# Patient Record
Sex: MALE | Race: WHITE | NOT HISPANIC OR LATINO | Employment: FULL TIME | URBAN - METROPOLITAN AREA
[De-identification: names, ages, dates, MRNs, and addresses within clinical notes are randomized per-mention and may not be internally consistent; named-entity substitution may affect disease eponyms.]

---

## 2017-03-28 ENCOUNTER — OFFICE VISIT (OUTPATIENT)
Dept: PEDIATRIC CARDIOLOGY | Facility: CLINIC | Age: 27
End: 2017-03-28
Attending: PEDIATRICS
Payer: COMMERCIAL

## 2017-03-28 ENCOUNTER — HOSPITAL ENCOUNTER (OUTPATIENT)
Dept: CARDIOLOGY | Facility: CLINIC | Age: 27
Discharge: HOME | End: 2017-03-28
Attending: PEDIATRICS | Admitting: PEDIATRICS
Payer: COMMERCIAL

## 2017-03-28 VITALS
DIASTOLIC BLOOD PRESSURE: 82 MMHG | SYSTOLIC BLOOD PRESSURE: 118 MMHG | OXYGEN SATURATION: 100 % | BODY MASS INDEX: 28.93 KG/M2 | HEIGHT: 69 IN | WEIGHT: 195.33 LBS | RESPIRATION RATE: 20 BRPM | HEART RATE: 76 BPM

## 2017-03-28 DIAGNOSIS — Q22.5 EBSTEIN'S ANOMALY: ICD-10-CM

## 2017-03-28 DIAGNOSIS — I47.10 SVT (SUPRAVENTRICULAR TACHYCARDIA) (H): Primary | ICD-10-CM

## 2017-03-28 LAB
EXPTIME-PRE: 6.01 SEC
FEF2575-%PRED-PRE: 100 %
FEF2575-PRE: 4.78 L/SEC
FEF2575-PRED: 4.75 L/SEC
FEFMAX-%PRED-PRE: 119 %
FEFMAX-PRE: 12.21 L/SEC
FEFMAX-PRED: 10.22 L/SEC
FEV1-%PRED-PRE: 98 %
FEV1-PRE: 4.5 L
FEV1FEV6-PRE: 84 %
FEV1FEV6-PRED: 84 %
FEV1FVC-PRE: 84 %
FEV1FVC-PRED: 84 %
FIFMAX-PRE: 6.27 L/SEC
FVC-%PRED-PRE: 96 %
FVC-PRE: 5.33 L
FVC-PRED: 5.51 L

## 2017-03-28 PROCEDURE — 94621 CARDIOPULM EXERCISE TESTING: CPT

## 2017-03-28 PROCEDURE — 99213 OFFICE O/P EST LOW 20 MIN: CPT | Mod: 25,ZF

## 2017-03-28 ASSESSMENT — PAIN SCALES - GENERAL: PAINLEVEL: NO PAIN (0)

## 2017-03-28 NOTE — PATIENT INSTRUCTIONS
PEDS CARDIOLOGY  [facility] [address] [place] [address]  [address] [facility] [id number] [phone]      Cardiology Clinic  [phone]  Cardiology Office  [phone]  RN Care Coordinator, Milvia Barbosa (Bre)  [phone]  Pediatric Call Center/Scheduling  [phone]    After Hours and Emergency Contact Number  [phone]  * Ask for the pediatric cardiologist on call         Prescription Renewals  The pharmacy must fax requests to [phone]  * Please allow 3-4 days for prescriptions to be authorized

## 2017-03-28 NOTE — PROGRESS NOTES
2017             Hamzah Atkins MD    [facility]   [address] [address] [place], [place] [address]      RE:    Xavi Mtz   MRN:  [id number]   :  1990      Dear Dr. Atkins:      I had the pleasure of seeing your 26-year-old patient, Xavi, for cardiac and cardiac rhythm followup on 2017.  I last saw him in 2016.  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  I have known him since he was a baby.  He had a tricuspid valve replacement with a tissue valve in  at the [facility].  He was a 33 mm valve.When I see him next, I will have his [facility] records to be able to specify the type of valve.  Starting in , he had several episodes of supraventricular tachycardia which we eventually managed with diltiazem  mg a day.  He has not had any emergency room visits for the past 2 years.  He occasionally, perhaps twice a month, has 10 minutes to at most and hours worth of tachycardia.  It has not really limited his activities.  He has gotten a job a [employer] company.  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  He has had this job for 6-8 months.  It pays well, but he admits it is very hard work.  He is sometimes lightheaded at work.  He has not fainted.  He takes no chronic medications.      ALLERGIES:  He is allergic to Ceclor.      PHYSICAL EXAMINATION:  Shows an alert, oriented, acyanotic young man accompanied by his brother and mother.  Blood pressure is 118/82 and the heart rate is 76.  Weight is 88.6 kg, and height 175.6 cm.  Weight is stable.  Neck is supple without adenopathy.  Mucous membranes are pink.  Chest is clear.  Cardiac exam shows a quiet precordium with a normal first and physiologically split second heart sound.  There is no murmur.  Liver is not enlarged.  Peripheral pulses are present.  There is no peripheral edema.  Neurologic exam is grossly intact.  There is no  clubbing or cyanosis.      Xavi had an electrocardiogram today, which showed sinus rhythm with first degree AV block and a pseudo right bundle branch block.  He had an exercise stress test using a Lv protocol.  He exercised for 9 minutes 41 seconds.  Blood pressure ish appropriately during exercise, but at peak exercise, he became lightheaded.  Immediately following exercise, the blood pressure had dropped to mid 90s.  Heart rate ish during exercise but did reach a peak of 150 beats per minute.  The data suggested that limits in his ability to increase his stroke volume limited his cardiac output and also limited his ability to diffuse generated CO2.      IMPRESSION:  At this point, it is reasonable to think that Xavi does have limitations in his cardiac output.  I cannot be certain at this point whether they are due to stenosis of his prosthetic valve, deficiencies in performance of his right ventricle secondary to Ebstein, as well as an element of chronotropic incompetence, which I think is mild.  In addition, Xavi is also deconditioned.        At this point, I think we will plan for him to have a followup echo in a couple of months.  If things continue to look the same, I will suggest he undergo cardiac catheterization.  I have spoken to Dr. Kulkarni about that.  We will also think about putting him on a baby aspirin a day.  He will continue his diltiazem.  If he should need to go for cardiac surgery for valve replacement, we will have to address his supraventricular tachycardia.  I have asked Xavi to get in touch with us regarding the size of his prosthetic valve (he did.  It is a 33 mm valve).  He also has a complete copy of his [facility] records which he will bring to his next clinic appointment.      I appreciate the opportunity to participate in Xavi's care.  Please let me know if I can provide any other information for you.      Sincerely,         Breanna Sanchez MD       I spoke with Dr. Kulkarni re:   Xavi's CP treadmill.  He suggested a cardiac MRI to look at the tricuspid valve.  I have sent a note to Dr. Leal about this.  Once I hear back, I will call Xavi.    Dr. Leal thought that cardiac CT would show calcification in the tricuspid valve better than MRI, so I will arrange that.  I have talked to Xavi Moody about this.  I would like to see him in June for echo, and to discuss possible cath.

## 2017-03-28 NOTE — NURSING NOTE
"Chief Complaint   Patient presents with     Heart Problem     SVT.       Initial /82 (BP Location: Right arm, Cuff Size: Adult Large)  Pulse 76  Resp 20  Ht 5' 9.13\" (175.6 cm)  Wt 195 lb 5.2 oz (88.6 kg)  SpO2 100%  BMI 28.73 kg/m2 Estimated body mass index is 28.73 kg/(m^2) as calculated from the following:    Height as of this encounter: 5' 9.13\" (175.6 cm).    Weight as of this encounter: 195 lb 5.2 oz (88.6 kg).  Medication Reconciliation: complete       Smita Bhatt M.A.    "

## 2017-03-28 NOTE — LETTER
3/28/2017      RE: Xavi Mtz  [address] [facility] [address]       2017             Hamzah Atkins MD    [facility]   [address] [address] [place] [address]      RE:    Xavi Mtz   MRN:  [id number]   :  1990      Dear Dr. Atkins:      I had the pleasure of seeing your 26-year-old patient, Xavi, for cardiac and cardiac rhythm followup on 2017.  I last saw him in 2016.  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  I have known him since he was a baby.  He had a tricuspid valve replacement with a tissue valve in  at the [facility].  He was a 33 mm valve.When I see him next, I will have his [facility] records to be able to specify the type of valve.  Starting in , he had several episodes of supraventricular tachycardia which we eventually managed with diltiazem  mg a day.  He has not had any emergency room visits for the past 2 years.  He occasionally, perhaps twice a month, has 10 minutes to at most and hours worth of tachycardia.  It has not really limited his activities.  He has gotten a job a [employer] company.  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  He has had this job for 6-8 months.  It pays well, but he admits it is very hard work.  He is sometimes lightheaded at work.  He has not fainted.  He takes no chronic medications.      ALLERGIES:  He is allergic to Ceclor.      PHYSICAL EXAMINATION:  Shows an alert, oriented, acyanotic young man accompanied by his brother and mother.  Blood pressure is 118/82 and the heart rate is 76.  Weight is 88.6 kg, and height 175.6 cm.  Weight is stable.  Neck is supple without adenopathy.  Mucous membranes are pink.  Chest is clear.  Cardiac exam shows a quiet precordium with a normal first and physiologically split second heart sound.  There is no murmur.  Liver is not enlarged.  Peripheral pulses are  present.  There is no peripheral edema.  Neurologic exam is grossly intact.  There is no clubbing or cyanosis.      Xavi had an electrocardiogram today, which showed sinus rhythm with first degree AV block and a pseudo right bundle branch block.  He had an exercise stress test using a Lv protocol.  He exercised for 9 minutes 41 seconds.  Blood pressure ish appropriately during exercise, but at peak exercise, he became lightheaded.  Immediately following exercise, the blood pressure had dropped to mid 90s.  Heart rate ish during exercise but did reach a peak of 150 beats per minute.  The data suggested that limits in his ability to increase his stroke volume limited his cardiac output and also limited his ability to diffuse generated CO2.      IMPRESSION:  At this point, it is reasonable to think that Xavi does have limitations in his cardiac output.  I cannot be certain at this point whether they are due to stenosis of his prosthetic valve, deficiencies in performance of his right ventricle secondary to Ebstein, as well as an element of chronotropic incompetence, which I think is mild.  In addition, Xavi is also deconditioned.        At this point, I think we will plan for him to have a followup echo in a couple of months.  If things continue to look the same, I will suggest he undergo cardiac catheterization.  I have spoken to Dr. Kulkarni about that.  We will also think about putting him on a baby aspirin a day.  He will continue his diltiazem.  If he should need to go for cardiac surgery for valve replacement, we will have to address his supraventricular tachycardia.  I have asked Xavi to get in touch with us regarding the size of his prosthetic valve (he did.  It is a 33 mm valve).  He also has a complete copy of his [facility] records which he will bring to his next clinic appointment.      I appreciate the opportunity to participate in Xavi's care.  Please let me know if I can provide any other  information for you.      Sincerely,         Breanna Sanchez MD

## 2017-03-28 NOTE — MR AVS SNAPSHOT
After Visit Summary   3/28/2017    Xavi Mtz    MRN: [id number]           Patient Information     Date Of Birth          1990        Visit Information        Provider Department      3/28/2017 1:00 PM Breanna Sanchez MD Peds Cardiology        Today's Diagnoses     SVT (supraventricular tachycardia) (H)    -  1    Ebstein's anomaly          Care Instructions      PEDS CARDIOLOGY  [facility]  [address] [facility] [address] [phone]      Cardiology Clinic  [phone]  Cardiology Office  [phone]  RN Care Coordinator, Milvia Barbosa (Bre)  [phone]  Pediatric Call Center/Scheduling  [phone]    After Hours and Emergency Contact Number  [phone]  * Ask for the pediatric cardiologist on call         Prescription Renewals  The pharmacy must fax requests to [phone]  * Please allow 3-4 days for prescriptions to be authorized               Follow-ups after your visit        Follow-up notes from your care team     Return in about 3 months (around 6/28/2017) for [facility] office.      Who to contact     Please call your clinic at [phone] to:    Ask questions about your health    Make or cancel appointments    Discuss your medicines    Learn about your test results    Speak to your doctor   If you have compliments or concerns about an experience at your clinic, or if you wish to file a complaint, please contact [facility] Patient Relations at [phone] or email us at [email or web address]         Additional Information About Your Visit        ClickFoxhart Information     Showpad is an electronic gateway that provides easy, online access to your medical records. With Showpad, you can request a clinic appointment, read your test results, renew a prescription or communicate with your care team.     To sign up for Showpad visit the website at  "[email or web address]   You will be asked to enter the access code listed below, as well as some personal information. Please follow the directions to create your username and password.     Your access code is: [email or web address]  Expires: 2017  9:17 AM     Your access code will  in 90 days. If you need help or a new code, please contact your [facility] or call [phone] for assistance.        Care EveryWhere ID     This is your Care EveryWhere ID. This could be used by other organizations to access your [place] medical records  [id number]        Your Vitals Were     Pulse Respirations Height Pulse Oximetry BMI (Body Mass Index)       76 20 5' 9.13\" (175.6 cm) 100% 28.73 kg/m2        Blood Pressure from Last 3 Encounters:   17 118/82   16 120/78   06/15/15 120/83    Weight from Last 3 Encounters:   17 195 lb 5.2 oz (88.6 kg)   16 195 lb 12.3 oz (88.8 kg)   06/15/15 202 lb 6.1 oz (91.8 kg)              Today, you had the following     No orders found for display       Primary Care Provider Office Phone # Fax #    Hamzah Atkins [phone]       [facility] [address] [address] [place] [address]        Thank you!     [facility]  [facility] your care. Our goal is always to provide you with excellent care. Hearing back from our patients is one way we can continue to improve our services. Please take a few minutes to complete the written survey that you may receive in the mail after your visit with us. Thank you!             Your Updated Medication List - Protect others around you: Learn how to safely use, store and throw away your medicines at www.disposemymeds.org.          This list is accurate as of: 3/28/17 11:59 PM.  Always use your most recent med list.                   Brand Name Dispense Instructions for use    diltiazem 240 MG 24 hr ER beaded capsule    TIAZAC    30 capsule    Take 1 " capsule (240 mg) by mouth daily

## 2017-04-13 DIAGNOSIS — Q22.5 EBSTEIN'S ANOMALY: Primary | ICD-10-CM

## 2017-04-13 NOTE — PROGRESS NOTES
The CT would not show how the leaflets are working very well. I think echo is the best for that.    CT would be good to tell you about calcification localization. If you werent worried about anything else, Just a plain Chest CT without contrast would be great. I would definitely indicate that you are looking for mitral valve calcifications in the clinical history.    MRI would be able to give you a good idea of how much regurgitation there is, but would be difficult to tell calcification. It would also be good to tell you right ventricular function.    Info from Dr. Xavi Leal

## 2017-05-02 ENCOUNTER — HOSPITAL ENCOUNTER (OUTPATIENT)
Dept: CT IMAGING | Facility: CLINIC | Age: 27
Discharge: HOME | End: 2017-05-02
Attending: PEDIATRICS | Admitting: PEDIATRICS
Payer: COMMERCIAL

## 2017-05-02 DIAGNOSIS — Q22.5 EBSTEIN'S ANOMALY: ICD-10-CM

## 2017-05-02 PROCEDURE — 71250 CT THORAX DX C-: CPT

## 2017-06-27 ENCOUNTER — OFFICE VISIT (OUTPATIENT)
Dept: PEDIATRIC CARDIOLOGY | Facility: CLINIC | Age: 27
End: 2017-06-27
Attending: PEDIATRICS
Payer: MEDICAID

## 2017-06-27 ENCOUNTER — HOSPITAL ENCOUNTER (OUTPATIENT)
Dept: CARDIOLOGY | Facility: CLINIC | Age: 27
Discharge: HOME | End: 2017-06-27
Attending: PEDIATRICS | Admitting: PEDIATRICS
Payer: MEDICAID

## 2017-06-27 VITALS
OXYGEN SATURATION: 99 % | RESPIRATION RATE: 20 BRPM | TEMPERATURE: 97.9 F | DIASTOLIC BLOOD PRESSURE: 84 MMHG | BODY MASS INDEX: 29.22 KG/M2 | HEART RATE: 96 BPM | SYSTOLIC BLOOD PRESSURE: 122 MMHG | WEIGHT: 197.31 LBS | HEIGHT: 69 IN

## 2017-06-27 DIAGNOSIS — I47.10 SVT (SUPRAVENTRICULAR TACHYCARDIA) (H): ICD-10-CM

## 2017-06-27 DIAGNOSIS — Q22.5 EBSTEIN'S ANOMALY: ICD-10-CM

## 2017-06-27 DIAGNOSIS — R00.0 TACHYCARDIA: ICD-10-CM

## 2017-06-27 LAB — INTERPRETATION ECG - MUSE: NORMAL

## 2017-06-27 PROCEDURE — 93325 DOPPLER ECHO COLOR FLOW MAPG: CPT

## 2017-06-27 PROCEDURE — 99213 OFFICE O/P EST LOW 20 MIN: CPT | Mod: 25,ZF

## 2017-06-27 PROCEDURE — 93005 ELECTROCARDIOGRAM TRACING: CPT | Mod: ZF

## 2017-06-27 RX ORDER — DILTIAZEM HYDROCHLORIDE 240 MG/1
240 CAPSULE, EXTENDED RELEASE ORAL DAILY
Qty: 30 CAPSULE | Refills: 11 | Status: SHIPPED | OUTPATIENT
Start: 2017-06-27 | End: 2017-10-02 | Stop reason: DRUGHIGH

## 2017-06-27 NOTE — LETTER
6/27/2017      RE: Xavi Mtz  [address] [facility] [address]         Hamzah Atkins MD    Dear Dr. Atkins,    I had the pleasure of meeting with Xavi, a 26 year old with Ebstein's anomaly on June 27, 2017.  He is here with his mother to review where we stood in terms of his ongoing cardiac evaluation.  In brief, he has a Hoda-Brantley 33 mm prosthetic tricuspid valve which was placed in 1999.  Over the past few years, there has been a gradual increase in his tricuspid valve gradient from 6 mm of Hg to 15 mm of Hg.  He has right ventricular enlargement and abnormal right ventricular systolic function as is common with Ebstein's.  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  This exercise made him lightheaded.  Although he did need the job, he was recently fired and is looking for a job that is not so strenuous.  He did have an exercise study in March 2017 which demonstrated limitation of cardiac output at peak exercise.  He subsequently had a CT scan which showed calcification of his tricuspid valve leaflets.  At this point, it seems that the next step is cardiac catheterization.  He may be a candidate for dilation of his tricuspid valve leaflets.  I did speak with him about tricuspid valve replacement if that seemed appropriate, but we agreed that we would have more information after his cardiac catheterization.  I did speak with Dr. Kulkarni about doing Xavi's catheterization and he was agreeable.  At this point, Xavi is expecting to receive a phone call about the catheterization schedule.      We also discussed Xavi's supraventricualr tachycardia.  It occurs a couple of times a month and lasts a few minutes.  It is much improved on diltiazem 240 mg per day.  If he were to need cardiac surgery for tricupid valve replacement, we would need to consider electrophysiology study and catheter ablation prior to surgery.     I spent more  than 25 minutes all in direct face to face discussion with Xavi and his mother today. EVeryone had a chance to have their questions answered.    I appreciate the opportunity to participate in Xavi's care.  Please let me know if I can provide any other information for you.    Sincerely,    Breanna Sanchez MD

## 2017-06-27 NOTE — PATIENT INSTRUCTIONS
PEDS CARDIOLOGY  [facility] [address] [place] [address]  [address] [facility] [id number] [phone]      Cardiology Clinic  [phone]  Cardiology Office  [phone]  RN Care Coordinator, Milvia Barbosa (Bre)  [phone]  Pediatric Call Center/Scheduling  [phone]    After Hours and Emergency Contact Number  [phone]  * Ask for the pediatric cardiologist on call         Prescription Renewals  The pharmacy must fax requests to [phone]  * Please allow 3-4 days for prescriptions to be authorized

## 2017-06-27 NOTE — MR AVS SNAPSHOT
After Visit Summary   6/27/2017    Xavi Mtz    MRN: [id number]           Patient Information     Date Of Birth          1990        Visit Information        Provider Department      6/27/2017 12:30 PM Breanna Sanchez MD Peds Cardiology        Today's Diagnoses     Ebstein's anomaly        SVT (supraventricular tachycardia) (H)        Tachycardia          Care Instructions      PEDS CARDIOLOGY  [facility]  [address] [facility] [address] [phone]      Cardiology Clinic  [phone]  Cardiology Office  [phone]  RN Care Coordinator, Milvia Barbosa (Bre)  [phone]  Pediatric Call Center/Scheduling  [phone]    After Hours and Emergency Contact Number  [phone]  * Ask for the pediatric cardiologist on call         Prescription Renewals  The pharmacy must fax requests to [phone]  * Please allow 3-4 days for prescriptions to be authorized               Follow-ups after your visit        Follow-up notes from your care team     Return in about 1 year (around 6/27/2018) for echo, ekg.      Who to contact     Please call your clinic at [phone] to:    Ask questions about your health    Make or cancel appointments    Discuss your medicines    Learn about your test results    Speak to your doctor   If you have compliments or concerns about an experience at your clinic, or if you wish to file a complaint, please contact [facility] Physicians Patient Relations at [phone] or email us at [email or web address]         Additional Information About Your Visit        MyChart Information     PayClip is an electronic gateway that provides easy, online access to your medical records. With PayClip, you can request a clinic appointment, read your test results, renew a prescription or communicate with your care team.     To sign up for PayClip visit the website at  "[email or web address]   You will be asked to enter the access code listed below, as well as some personal information. Please follow the directions to create your username and password.     Your access code is: [email or web address]  Expires: 2017  9:17 AM     Your access code will  in 90 days. If you need help or a new code, please contact your [facility] or call [phone] for assistance.        Care EveryWhere ID     This is your Care EveryWhere ID. This could be used by other organizations to access your [place] medical records  [id number]        Your Vitals Were     Pulse Temperature Respirations Height Pulse Oximetry BMI (Body Mass Index)    96 97.9  F (36.6  C) (Oral) 20 5' 9.41\" (176.3 cm) 99% 28.8 kg/m2       Blood Pressure from Last 3 Encounters:   17 122/84   17 118/82   16 120/78    Weight from Last 3 Encounters:   17 197 lb 5 oz (89.5 kg)   17 195 lb 5.2 oz (88.6 kg)   16 195 lb 12.3 oz (88.8 kg)              We Performed the Following     ECG - 12 Lead     EKG 12 lead - pediatric          Where to get your medicines      These medications were sent to [facility] #[id number] [address] [address]    Hours:  Closed 10-16-08 business to [facility] Phone:  [phone]     diltiazem 240 MG 24 hr ER beaded capsule          Primary Care Provider Office Phone # Fax #    Hamzah Atkins [phone]       [facility] [address] [facility] [address]        Equal Access to Services     ILDA HOROWITZ : Maida Hutchinson, wamanuelda lucristino, qaybta kaalmiladis wilhelm, jerrica mei. So [facility] [phone].    ATENCIÓN: Si habla español, tiene a bonilla disposición servicios gratuitos de asistencia lingüística. Llame al [phone].    We comply with applicable federal civil rights laws and [place] laws. We do " not discriminate on the basis of race, color, national origin, age, disability sex, sexual orientation or gender identity.            Thank you!     Thank you for choosing [facility] CARDIOLOGY  for your care. Our goal is always to provide you with excellent care. Hearing back from our patients is one way we can continue to improve our services. Please take a few minutes to complete the written survey that you may receive in the mail after your visit with us. Thank you!             Your Updated Medication List - Protect others around you: Learn how to safely use, store and throw away your medicines at www.disposemymeds.org.          This list is accurate as of: 6/27/17  1:16 PM.  Always use your most recent med list.                   Brand Name Dispense Instructions for use Diagnosis    diltiazem 240 MG 24 hr ER beaded capsule    TIAZAC    30 capsule    Take 1 capsule (240 mg) by mouth daily    Tachycardia

## 2017-06-27 NOTE — NURSING NOTE
"Chief Complaint   Patient presents with     RECHECK     Ebsteins anomaly and SVT       Initial /84 (BP Location: Right arm, Patient Position: Chair, Cuff Size: Adult Large)  Pulse 96  Temp 97.9  F (36.6  C) (Oral)  Resp 20  Ht 5' 9.41\" (176.3 cm)  Wt 197 lb 5 oz (89.5 kg)  SpO2 99%  BMI 28.8 kg/m2 Estimated body mass index is 28.8 kg/(m^2) as calculated from the following:    Height as of this encounter: 5' 9.41\" (176.3 cm).    Weight as of this encounter: 197 lb 5 oz (89.5 kg).  Medication Reconciliation: complete     Ashley Little LPN      "

## 2017-06-27 NOTE — PROGRESS NOTES
Hamzah Atkins MD    Dear Dr. Atkins,    I had the pleasure of meeting with Xavi, a 26 year old with Ebstein's anomaly on June 27, 2017.  He is here with his mother to review where we stood in terms of his ongoing cardiac evaluation.  In brief, he has a Hoda-Brantley 33 mm prosthetic tricuspid valve which was placed in 1999.  Over the past few years, there has been a gradual increase in his tricuspid valve gradient from 6 mm of Hg to 15 mm of Hg.  He has right ventricular enlargement and abnormal right ventricular systolic function as is common with Ebstein's.  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  This exercise made him lightheaded.  Although he did need the job, he was recently fired and is looking for a job that is not so strenuous.  He did have an exercise study in March 2017 which demonstrated limitation of cardiac output at peak exercise.  He subsequently had a CT scan which showed calcification of his tricuspid valve leaflets.  At this point, it seems that the next step is cardiac catheterization.  He may be a candidate for dilation of his tricuspid valve leaflets.  I did speak with him about tricuspid valve replacement if that seemed appropriate, but we agreed that we would have more information after his cardiac catheterization.  I did speak with Dr. Kulkarni about doing Xavi's catheterization and he was agreeable.  At this point, Xavi is expecting to receive a phone call about the catheterization schedule.      We also discussed Xavi's supraventricualr tachycardia.  It occurs a couple of times a month and lasts a few minutes.  It is much improved on diltiazem 240 mg per day.  If he were to need cardiac surgery for tricupid valve replacement, we would need to consider electrophysiology study and catheter ablation prior to surgery.     I spent more than 25 minutes all in direct face to face discussion with Xavi and his mother today.  EVeryone had a chance to have their questions answered.    I appreciate the opportunity to participate in Xavi's care.  Please let me know if I can provide any other information for you.    Sincerely,    Breanna Sanchez MD

## 2017-06-30 ENCOUNTER — TELEPHONE (OUTPATIENT)
Dept: PEDIATRIC CARDIOLOGY | Facility: CLINIC | Age: 27
End: 2017-06-30

## 2017-07-01 NOTE — TELEPHONE ENCOUNTER
Xavi called around 8 PM on 6/30/17 due to concern of left shoulder and elbow tingling.  He states that approx. 20-30 min prior he had episode of SVT with -215 bpm while sitting at the dinner table which self-resolved in about 5 min.  After this episode he noticed the left elbow and shoulder tingling.  He denies numbness or pain in the LUE.  Denies chest pain or pressure.  He has not experienced any difficulty using his extremities, weakness, walking and denies vision changes, shortness of breath.  He did say that when he reached over to his right shoulder this improved the pain.  Discussed with Dr. Holland and we both agree this is likely benign, but if he was to experience any of the above symptoms he is contact us so we can direct him for evaluation.      Diego Porter DO  Pediatric Cardiology Fellow  6/30/2017 11:30 PM

## 2017-07-10 ENCOUNTER — APPOINTMENT (OUTPATIENT)
Dept: OCCUPATIONAL MEDICINE | Facility: CLINIC | Age: 27
End: 2017-07-10

## 2017-07-10 PROCEDURE — 99499 UNLISTED E&M SERVICE: CPT | Performed by: PHYSICIAN ASSISTANT

## 2017-07-10 PROCEDURE — 82075 ASSAY OF BREATH ETHANOL: CPT | Performed by: PHYSICIAN ASSISTANT

## 2017-07-10 PROCEDURE — 99000 SPECIMEN HANDLING OFFICE-LAB: CPT | Performed by: PHYSICIAN ASSISTANT

## 2017-07-10 PROCEDURE — 97799 UNLISTED PHYSCL MED/REHAB PX: CPT | Performed by: PHYSICIAN ASSISTANT

## 2017-07-26 ENCOUNTER — ANESTHESIA EVENT (OUTPATIENT)
Dept: SURGERY | Facility: CLINIC | Age: 27
End: 2017-07-26
Payer: MEDICAID

## 2017-07-26 NOTE — OR NURSING
Yesterday during pre-op call with a different RN, pt had a question regarding whether to take or hold his Diltiazem. Cath lab called and spoke to fellow on call, Dr. Lawler, who stated she would speak to Dr. Kulkarni and call Xavi back and inform him whether he should hold the medication or not. She stated he should be fine to take it but was going to double check. Pt's phone # given to Dr. Lawler.

## 2017-07-27 ENCOUNTER — SURGERY (OUTPATIENT)
Age: 27
End: 2017-07-27

## 2017-07-27 ENCOUNTER — APPOINTMENT (OUTPATIENT)
Dept: CARDIOLOGY | Facility: CLINIC | Age: 27
End: 2017-07-27
Attending: PHYSICIAN ASSISTANT
Payer: MEDICAID

## 2017-07-27 ENCOUNTER — APPOINTMENT (OUTPATIENT)
Dept: CARDIOLOGY | Facility: CLINIC | Age: 27
End: 2017-07-27
Attending: PEDIATRICS
Payer: MEDICAID

## 2017-07-27 ENCOUNTER — HOSPITAL ENCOUNTER (OUTPATIENT)
Facility: CLINIC | Age: 27
Setting detail: OBSERVATION
Discharge: HOME | End: 2017-07-28
Attending: PEDIATRICS | Admitting: PEDIATRICS
Payer: MEDICAID

## 2017-07-27 ENCOUNTER — ANESTHESIA (OUTPATIENT)
Dept: SURGERY | Facility: CLINIC | Age: 27
End: 2017-07-27
Payer: MEDICAID

## 2017-07-27 DIAGNOSIS — Q22.5 EBSTEIN'S ANOMALY: Primary | ICD-10-CM

## 2017-07-27 DIAGNOSIS — M54.2 NECK PAIN: ICD-10-CM

## 2017-07-27 LAB
ABO + RH BLD: NORMAL
ABO + RH BLD: NORMAL
BLD GP AB SCN SERPL QL: NORMAL
BLD PROD TYP BPU: NORMAL
BLD UNIT ID BPU: 0
BLD UNIT ID BPU: 0
BLOOD BANK CMNT PATIENT-IMP: NORMAL
BLOOD PRODUCT CODE: NORMAL
BLOOD PRODUCT CODE: NORMAL
BPU ID: NORMAL
BPU ID: NORMAL
HCT VFR BLD AUTO: 37.1 % (ref 40–53)
HGB BLD-MCNC: 13.3 G/DL (ref 13.3–17.7)
KCT BLD-ACNC: 192 SEC (ref 105–167)
KCT BLD-ACNC: 200 SEC (ref 105–167)
KCT BLD-ACNC: 213 SEC (ref 105–167)
KCT BLD-ACNC: 217 SEC (ref 105–167)
NUM BPU REQUESTED: 2
SPECIMEN EXP DATE BLD: NORMAL
TRANSFUSION STATUS PATIENT QL: NORMAL

## 2017-07-27 PROCEDURE — 27210956 ZZH BALLOON TIP PRESSURE CR7

## 2017-07-27 PROCEDURE — 27210946 ZZH KIT HC TOTES DISP CR8

## 2017-07-27 PROCEDURE — 25000128 H RX IP 250 OP 636: Performed by: ANESTHESIOLOGY

## 2017-07-27 PROCEDURE — P9041 ALBUMIN (HUMAN),5%, 50ML: HCPCS | Performed by: NURSE ANESTHETIST, CERTIFIED REGISTERED

## 2017-07-27 PROCEDURE — C1725 CATH, TRANSLUMIN NON-LASER: HCPCS

## 2017-07-27 PROCEDURE — 25000132 ZZH RX MED GY IP 250 OP 250 PS 637: Performed by: STUDENT IN AN ORGANIZED HEALTH CARE EDUCATION/TRAINING PROGRAM

## 2017-07-27 PROCEDURE — 27210769 ZZH KIT ACIST INJECTOR CR4

## 2017-07-27 PROCEDURE — 40000170 ZZH STATISTIC PRE-PROCEDURE ASSESSMENT II: Performed by: PEDIATRICS

## 2017-07-27 PROCEDURE — 85014 HEMATOCRIT: CPT | Performed by: STUDENT IN AN ORGANIZED HEALTH CARE EDUCATION/TRAINING PROGRAM

## 2017-07-27 PROCEDURE — 92990 REVISION OF PULMONARY VALVE: CPT

## 2017-07-27 PROCEDURE — 25000128 H RX IP 250 OP 636: Performed by: STUDENT IN AN ORGANIZED HEALTH CARE EDUCATION/TRAINING PROGRAM

## 2017-07-27 PROCEDURE — 83605 ASSAY OF LACTIC ACID: CPT | Performed by: PEDIATRICS

## 2017-07-27 PROCEDURE — 25000128 H RX IP 250 OP 636: Performed by: NURSE ANESTHETIST, CERTIFIED REGISTERED

## 2017-07-27 PROCEDURE — C1887 CATHETER, GUIDING: HCPCS

## 2017-07-27 PROCEDURE — C1769 GUIDE WIRE: HCPCS

## 2017-07-27 PROCEDURE — 86901 BLOOD TYPING SEROLOGIC RH(D): CPT | Performed by: PEDIATRICS

## 2017-07-27 PROCEDURE — 86850 RBC ANTIBODY SCREEN: CPT | Performed by: PEDIATRICS

## 2017-07-27 PROCEDURE — 27210841 ZZH MANIFOLD CR5

## 2017-07-27 PROCEDURE — 27211192 ZZ H SHEATH CR14

## 2017-07-27 PROCEDURE — 86923 COMPATIBILITY TEST ELECTRIC: CPT | Performed by: PEDIATRICS

## 2017-07-27 PROCEDURE — 93306 TTE W/DOPPLER COMPLETE: CPT

## 2017-07-27 PROCEDURE — 83605 ASSAY OF LACTIC ACID: CPT | Performed by: ANESTHESIOLOGY

## 2017-07-27 PROCEDURE — 93799 UNLISTED CV SVC/PROCEDURE: CPT

## 2017-07-27 PROCEDURE — G0378 HOSPITAL OBSERVATION PER HR: HCPCS

## 2017-07-27 PROCEDURE — 36010 PLACE CATHETER IN VEIN: CPT

## 2017-07-27 PROCEDURE — 85347 COAGULATION TIME ACTIVATED: CPT | Mod: 91

## 2017-07-27 PROCEDURE — 75827 VEIN X-RAY CHEST: CPT

## 2017-07-27 PROCEDURE — 82803 BLOOD GASES ANY COMBINATION: CPT | Performed by: ANESTHESIOLOGY

## 2017-07-27 PROCEDURE — 93566 NJX CAR CTH SLCTV RV/RA ANG: CPT

## 2017-07-27 PROCEDURE — 27210803 ZZH SHEATH CR11

## 2017-07-27 PROCEDURE — 93531 ZZHC COMB RT & LT HEART CATH FOR CONGENITAL ANOMALIES: CPT

## 2017-07-27 PROCEDURE — 36415 COLL VENOUS BLD VENIPUNCTURE: CPT | Performed by: PEDIATRICS

## 2017-07-27 PROCEDURE — P9016 RBC LEUKOCYTES REDUCED: HCPCS | Performed by: PEDIATRICS

## 2017-07-27 PROCEDURE — 25000128 H RX IP 250 OP 636: Performed by: PEDIATRICS

## 2017-07-27 PROCEDURE — 86900 BLOOD TYPING SEROLOGIC ABO: CPT | Performed by: PEDIATRICS

## 2017-07-27 PROCEDURE — 85018 HEMOGLOBIN: CPT | Performed by: STUDENT IN AN ORGANIZED HEALTH CARE EDUCATION/TRAINING PROGRAM

## 2017-07-27 PROCEDURE — 27210741 ZZH BALLOON TIP PRESSURE CR6

## 2017-07-27 PROCEDURE — 37000009 ZZH ANESTHESIA TECHNICAL FEE, EACH ADDTL 15 MIN: Performed by: PEDIATRICS

## 2017-07-27 PROCEDURE — 25000125 ZZHC RX 250: Performed by: NURSE ANESTHETIST, CERTIFIED REGISTERED

## 2017-07-27 PROCEDURE — 37000008 ZZH ANESTHESIA TECHNICAL FEE, 1ST 30 MIN: Performed by: PEDIATRICS

## 2017-07-27 RX ORDER — KETOROLAC TROMETHAMINE 30 MG/ML
30 INJECTION, SOLUTION INTRAMUSCULAR; INTRAVENOUS ONCE
Status: COMPLETED | OUTPATIENT
Start: 2017-07-27 | End: 2017-07-27

## 2017-07-27 RX ORDER — DILTIAZEM HYDROCHLORIDE 240 MG/1
240 CAPSULE, EXTENDED RELEASE ORAL DAILY
Status: DISCONTINUED | OUTPATIENT
Start: 2017-07-27 | End: 2017-07-27

## 2017-07-27 RX ORDER — ACETAMINOPHEN 325 MG/1
650 TABLET ORAL EVERY 4 HOURS PRN
Status: DISCONTINUED | OUTPATIENT
Start: 2017-07-27 | End: 2017-07-28

## 2017-07-27 RX ORDER — HEPARIN SODIUM 1000 [USP'U]/ML
INJECTION, SOLUTION INTRAVENOUS; SUBCUTANEOUS PRN
Status: DISCONTINUED | OUTPATIENT
Start: 2017-07-27 | End: 2017-07-27

## 2017-07-27 RX ORDER — PROPOFOL 10 MG/ML
INJECTION, EMULSION INTRAVENOUS CONTINUOUS PRN
Status: DISCONTINUED | OUTPATIENT
Start: 2017-07-27 | End: 2017-07-27

## 2017-07-27 RX ORDER — SODIUM CHLORIDE, SODIUM LACTATE, POTASSIUM CHLORIDE, CALCIUM CHLORIDE 600; 310; 30; 20 MG/100ML; MG/100ML; MG/100ML; MG/100ML
INJECTION, SOLUTION INTRAVENOUS CONTINUOUS PRN
Status: DISCONTINUED | OUTPATIENT
Start: 2017-07-27 | End: 2017-07-27

## 2017-07-27 RX ORDER — DILTIAZEM HYDROCHLORIDE 240 MG/1
240 CAPSULE, COATED, EXTENDED RELEASE ORAL DAILY
Status: DISCONTINUED | OUTPATIENT
Start: 2017-07-28 | End: 2017-07-27

## 2017-07-27 RX ORDER — ALBUMIN, HUMAN INJ 5% 5 %
SOLUTION INTRAVENOUS CONTINUOUS PRN
Status: DISCONTINUED | OUTPATIENT
Start: 2017-07-27 | End: 2017-07-27

## 2017-07-27 RX ORDER — DILTIAZEM HYDROCHLORIDE 120 MG/1
240 CAPSULE, COATED, EXTENDED RELEASE ORAL DAILY
Status: DISCONTINUED | OUTPATIENT
Start: 2017-07-28 | End: 2017-07-27

## 2017-07-27 RX ORDER — FENTANYL CITRATE 50 UG/ML
25-50 INJECTION, SOLUTION INTRAMUSCULAR; INTRAVENOUS
Status: DISCONTINUED | OUTPATIENT
Start: 2017-07-27 | End: 2017-07-27

## 2017-07-27 RX ORDER — ONDANSETRON 2 MG/ML
4 INJECTION INTRAMUSCULAR; INTRAVENOUS EVERY 30 MIN PRN
Status: DISCONTINUED | OUTPATIENT
Start: 2017-07-27 | End: 2017-07-27

## 2017-07-27 RX ORDER — PROPOFOL 10 MG/ML
INJECTION, EMULSION INTRAVENOUS PRN
Status: DISCONTINUED | OUTPATIENT
Start: 2017-07-27 | End: 2017-07-27

## 2017-07-27 RX ORDER — ONDANSETRON 4 MG/1
4 TABLET, ORALLY DISINTEGRATING ORAL EVERY 30 MIN PRN
Status: DISCONTINUED | OUTPATIENT
Start: 2017-07-27 | End: 2017-07-27

## 2017-07-27 RX ORDER — IODIXANOL 320 MG/ML
INJECTION, SOLUTION INTRAVASCULAR PRN
Status: DISCONTINUED | OUTPATIENT
Start: 2017-07-27 | End: 2017-07-27 | Stop reason: HOSPADM

## 2017-07-27 RX ORDER — SODIUM CHLORIDE, SODIUM LACTATE, POTASSIUM CHLORIDE, CALCIUM CHLORIDE 600; 310; 30; 20 MG/100ML; MG/100ML; MG/100ML; MG/100ML
INJECTION, SOLUTION INTRAVENOUS CONTINUOUS
Status: DISCONTINUED | OUTPATIENT
Start: 2017-07-27 | End: 2017-07-27

## 2017-07-27 RX ORDER — DOPAMINE HYDROCHLORIDE 160 MG/100ML
INJECTION, SOLUTION INTRAVENOUS CONTINUOUS PRN
Status: DISCONTINUED | OUTPATIENT
Start: 2017-07-27 | End: 2017-07-27

## 2017-07-27 RX ORDER — CALCIUM CHLORIDE 100 MG/ML
INJECTION INTRAVENOUS; INTRAVENTRICULAR PRN
Status: DISCONTINUED | OUTPATIENT
Start: 2017-07-27 | End: 2017-07-27

## 2017-07-27 RX ORDER — ONDANSETRON 2 MG/ML
INJECTION INTRAMUSCULAR; INTRAVENOUS PRN
Status: DISCONTINUED | OUTPATIENT
Start: 2017-07-27 | End: 2017-07-27

## 2017-07-27 RX ORDER — FENTANYL CITRATE 50 UG/ML
INJECTION, SOLUTION INTRAMUSCULAR; INTRAVENOUS PRN
Status: DISCONTINUED | OUTPATIENT
Start: 2017-07-27 | End: 2017-07-27

## 2017-07-27 RX ORDER — CEFAZOLIN SODIUM 1 G/3ML
INJECTION, POWDER, FOR SOLUTION INTRAMUSCULAR; INTRAVENOUS PRN
Status: DISCONTINUED | OUTPATIENT
Start: 2017-07-27 | End: 2017-07-27

## 2017-07-27 RX ORDER — LIDOCAINE HYDROCHLORIDE 20 MG/ML
INJECTION, SOLUTION INFILTRATION; PERINEURAL PRN
Status: DISCONTINUED | OUTPATIENT
Start: 2017-07-27 | End: 2017-07-27

## 2017-07-27 RX ADMIN — HEPARIN SODIUM 1500 UNITS: 1000 INJECTION, SOLUTION INTRAVENOUS; SUBCUTANEOUS at 13:42

## 2017-07-27 RX ADMIN — DOPAMINE HYDROCHLORIDE 5 MCG/KG/MIN: 160 INJECTION, SOLUTION INTRAVENOUS at 13:31

## 2017-07-27 RX ADMIN — SODIUM CHLORIDE, POTASSIUM CHLORIDE, SODIUM LACTATE AND CALCIUM CHLORIDE: 600; 310; 30; 20 INJECTION, SOLUTION INTRAVENOUS at 14:21

## 2017-07-27 RX ADMIN — PROPOFOL 30 MG: 10 INJECTION, EMULSION INTRAVENOUS at 14:10

## 2017-07-27 RX ADMIN — ALBUMIN (HUMAN): 12.5 SOLUTION INTRAVENOUS at 12:55

## 2017-07-27 RX ADMIN — ACETAMINOPHEN 650 MG: 325 TABLET, FILM COATED ORAL at 17:07

## 2017-07-27 RX ADMIN — FENTANYL CITRATE 50 MCG: 50 INJECTION, SOLUTION INTRAMUSCULAR; INTRAVENOUS at 11:31

## 2017-07-27 RX ADMIN — HEPARIN SODIUM 6000 UNITS: 1000 INJECTION, SOLUTION INTRAVENOUS; SUBCUTANEOUS at 12:48

## 2017-07-27 RX ADMIN — CEFAZOLIN 2 G: 1 INJECTION, POWDER, FOR SOLUTION INTRAMUSCULAR; INTRAVENOUS at 11:55

## 2017-07-27 RX ADMIN — KETOROLAC TROMETHAMINE 30 MG: 30 INJECTION, SOLUTION INTRAMUSCULAR; INTRAVENOUS at 18:56

## 2017-07-27 RX ADMIN — SODIUM CHLORIDE, POTASSIUM CHLORIDE, SODIUM LACTATE AND CALCIUM CHLORIDE: 600; 310; 30; 20 INJECTION, SOLUTION INTRAVENOUS at 11:42

## 2017-07-27 RX ADMIN — CALCIUM CHLORIDE 500 MG: 100 INJECTION, SOLUTION INTRAVENOUS at 13:00

## 2017-07-27 RX ADMIN — PROPOFOL 20 MG: 10 INJECTION, EMULSION INTRAVENOUS at 14:16

## 2017-07-27 RX ADMIN — PROPOFOL 30 MG: 10 INJECTION, EMULSION INTRAVENOUS at 14:02

## 2017-07-27 RX ADMIN — SODIUM CHLORIDE, POTASSIUM CHLORIDE, SODIUM LACTATE AND CALCIUM CHLORIDE: 600; 310; 30; 20 INJECTION, SOLUTION INTRAVENOUS at 13:31

## 2017-07-27 RX ADMIN — ACETAMINOPHEN 650 MG: 325 TABLET, FILM COATED ORAL at 22:53

## 2017-07-27 RX ADMIN — MIDAZOLAM HYDROCHLORIDE 2 MG: 1 INJECTION, SOLUTION INTRAMUSCULAR; INTRAVENOUS at 11:31

## 2017-07-27 RX ADMIN — LIDOCAINE HYDROCHLORIDE 60 MG: 20 INJECTION, SOLUTION INFILTRATION; PERINEURAL at 11:42

## 2017-07-27 RX ADMIN — CALCIUM CHLORIDE 500 MG: 100 INJECTION, SOLUTION INTRAVENOUS at 13:05

## 2017-07-27 RX ADMIN — PROPOFOL 30 MG: 10 INJECTION, EMULSION INTRAVENOUS at 11:42

## 2017-07-27 RX ADMIN — PROPOFOL 100 MCG/KG/MIN: 10 INJECTION, EMULSION INTRAVENOUS at 11:42

## 2017-07-27 RX ADMIN — ONDANSETRON 4 MG: 2 INJECTION INTRAMUSCULAR; INTRAVENOUS at 15:10

## 2017-07-27 RX ADMIN — IODIXANOL 129 ML: 320 INJECTION, SOLUTION INTRAVASCULAR at 15:33

## 2017-07-27 RX ADMIN — CEFAZOLIN 2 G: 1 INJECTION, POWDER, FOR SOLUTION INTRAMUSCULAR; INTRAVENOUS at 15:07

## 2017-07-27 RX ADMIN — PROPOFOL 30 MG: 10 INJECTION, EMULSION INTRAVENOUS at 11:45

## 2017-07-27 NOTE — H&P
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  Presenting for cardiac catheterization and balloon dilation after findings of increasing pressure gradients across valve and low CO.    During catheterization had low systolics to 60s requiring brief dopamine administration.  Patient arrived to CVICU on no cardiovascular medications, awake, and spontaneously breathing.    Problem List:  Patient Active Problem List    Diagnosis Date Noted     Ebstein's anomaly 03/24/2015     Priority: Medium     S/p tricuspid valve replacement 1999       SVT (supraventricular tachycardia) (H) 03/24/2015     Priority: Medium         Past Procedural History:  Past Surgical History:   Procedure Laterality Date     CARDIAC SURGERY           Cardiac Catheterization Results:  Baseline  RA filling pressures high mean 15/17/14  RV 23/3/7  CI 3.97 L/min  Tricuspid valve area 0.64 cm2  Calculated mean mitral valve gradient 11 mmHg     After balloon dilation and on inotrope Dopamine  CI 7.25 L/min  Valve area 1.81 cm2  Calculated mean mitral valve gradient 5.3 mmHg      Interval History:  Patient arrived extubated on a backboard, with no s/sx of bleeding at cath site and stable hemodynamics.      Plan by Systems:    CV:  - Echo in AM  - monitor cath sites in groin b/l and right neck  - flat until 2000  - on home diltiazem    RESP:   - On facemask, wean O2 as able    FEN/GI:   - ice chips and sips allowed while supine  - allowed to PO full diet once upright    HEME:   -Monitor cath site for any s/sx of bleeding, bruising or hematoma  - CBC at 1800    ID:   -No active issues    ENDO:   -No active issues    CNS:   -Tylenol PRN for comfort   - Toradol x1 if needed       Vitals:  All vital signs  reviewed    Temp:  [97.9  F (36.6  C)] 97.9  F (36.6  C)  Heart Rate:  [76] 76  Resp:  [18] 18  BP: (119)/(87) 119/87  SpO2:  [98 %] 98 %  SpO2 Readings from Last 2 Encounters:   07/27/17 98%   06/27/17 99%           I/O last 3 completed shifts:  In: 1500 [I.V.:1000]  Out: -   I/O this shift:  In: -   Out: 1300 [Urine:1300]    Medications:  All medications reviewed      Physical Exam  Awake, alert, NAD, interacting appropriately  CTAB, no increased WOB  HR in 60s, no murmur auscultated  Ab soft, NTND, no masses or HSM  Mild tenderness over right neck and right groin  Bandages over right neck and left and right groin with no bleeding or hematomas  PT and DP palpated easily on both feet    Radiology:  All radiological studies reviewed    Laboratory:  All laboratory values reviewed    Pediatric Critical Care Attestation:     Patient is admitted to the ICU and is receiving intermediate level cares after cath procedure to balloon dilate tricuspid valve. Monitor cath sites for bleeding, check H and H tonight, toradol and tylenol for pain.  I personally examined and evaluated the patient today, and have discussed plans with the resident/NP/PA/fellow and nurse. All physician orders and treatments were placed at my direction.   Patient's weight today: 198 lbs 13.68 oz    I spent a total of  35  minutes providing hospital care at the bedside and on the unit, evaluating the patient, directing care and reviewing laboratory values and radiologic reports for this patient.    Alea Velasquez MD

## 2017-07-27 NOTE — ANESTHESIA POSTPROCEDURE EVALUATION
Patient: Xavi Mtz    Procedure(s):  Right and Left Heart Cath    Diagnosis:Ebstein's Anomaly  Diagnosis Additional Information: No value filed.    Anesthesia Type:  General, Other    Note:  Anesthesia Post Evaluation    Patient location during evaluation: ICU  Patient participation: Able to fully participate in evaluation  Level of consciousness: awake and alert  Pain management: adequate  Airway patency: patent  Cardiovascular status: hemodynamically stable  Respiratory status: spontaneous ventilation  Hydration status: euvolemic  PONV: none     Anesthetic complications: None          Last vitals:  Vitals:    07/27/17 0916   BP: 119/87   Resp: 18   Temp: 36.6  C (97.9  F)   SpO2: 98%         Electronically Signed By: Althea Dejesus MD  July 27, 2017  4:08 PM

## 2017-07-27 NOTE — ANESTHESIA PREPROCEDURE EVALUATION
Anesthesia Plan      History & Physical Review  History and physical reviewed and following examination; no interval change.    ASA Status:  3 .    NPO Status:  > 6 hours    Plan for General and Other with Intravenous induction. Maintenance will be TIVA.    PONV prophylaxis:  Ondansetron (or other 5HT-3)       Postoperative Care  Postoperative pain management:  IV analgesics.      Consents  Anesthetic plan, risks, benefits and alternatives discussed with:  Patient..        ANESTHESIA PREOP EVALUATION    Procedure: right and left heart cathetrization    HPI: Ebstein's anomaly, s/p TV replacement    PMHx/PSHx/ROS:  Past Medical History:   Diagnosis Date     Congenital heart disease     Ebstein's Anomaly     Past Surgical History:   Procedure Laterality Date     CARDIAC SURGERY       CV: as above  Pulm: neg  GI: neg  : neg  Endo: neg  CNS/Psych: neg  MSK: neg  Heme: neg  HEENT: neg    Past Anes Hx: No personal or family h/o anesthesia problems    Soc Hx:   Tobacco: neg  EtOH: neg    Allergies:   Allergies   Allergen Reactions     Ceclor [Cefaclor]      Mouth ulcers     Meds:   Prescriptions Prior to Admission   Medication Sig Dispense Refill Last Dose     diltiazem (TIAZAC) 240 MG 24 hr ER beaded capsule Take 1 capsule (240 mg) by mouth daily 30 capsule 11      No current outpatient prescriptions on file.     Physical Exam:  VS: T 97.9, P Data Unavailable, /87, R 18, SpO2 98%.    Weight 90 kg.  Airway: feasible  Dentition: noted  Heart: rrr+m  Lungs: ctab    NPO Status: OK    TTE: Patient with history of Ebstein's Anamoly, s/p 33 mm tricuspid valve  replacement. Tricuspid valve mean gradient 15 mmHg. Trivial tricuspid valve  insufficiency. 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 The right ventricular function is qualitatively mild  to moderately depressed. Normal  contractility of the left ventricular free  wall and apex.  No significant change from last echocardiogram.    Assessment/Plan:  - ASA 3  - GA with native airway and with standard ASA monitors, IV induction, balanced anesthetic  - PIVx1  - Antibiotics per surgery  - PONV prophylaxis  - Relevant risks, benefits, alternatives and the anesthetic plan were discussed with patient/family or family representative.  All questions were answered and there was agreement to proceed.      Althea Dejesus MD  Staff Anesthesiologist  [phone]    7/27/2017  9:37 AM

## 2017-07-27 NOTE — ANESTHESIA CARE TRANSFER NOTE
Patient: Xavi Mtz    Procedure(s):  Right and Left Heart Cath    Diagnosis: Ebstein's Anomaly  Diagnosis Additional Information: No value filed.    Anesthesia Type:   General, Other     Note:  Airway :Face Mask  Patient transferred to:ICU  Comments: Transported to ICU with FM O2.  Monitors on. VSS.  Report given.  Patient comfortable.      Vitals: (Last set prior to Anesthesia Care Transfer)    CRNA VITALS  7/27/2017 1519 - 7/27/2017 1553      7/27/2017             Resp Rate (set): 10                Electronically Signed By: WENDY Abarca CRNA  July 27, 2017  3:53 PM

## 2017-07-27 NOTE — PROGRESS NOTES
Admitted from cath lab for observation.  Bilateral groins and right neck entry sites.  Accompanied by OR staff and handoff to CVICU staff at bedside.  On admission no active bleeding noted minimal swelling of the right neck, with 2/2 pulses.  Vital signs and assessment per flow sheet.

## 2017-07-27 NOTE — OR NURSING
Stress test per patient shows, Xavi taking in enough oxygen and not blowing off enough bi products

## 2017-07-27 NOTE — IP AVS SNAPSHOT
MRN:[id number]                      After Visit Summary   7/27/2017    Xavi Mtz    MRN: [id number]           Thank you!     Thank you for choosing [facility] for your care. Our goal is always to provide you with excellent care. Hearing back from our patients is one way we can continue to improve our services. Please take a few minutes to complete the written survey that you may receive in the mail after you visit with us. Thank you!        Patient Information     Date Of Birth          1990        About your hospital stay     You were admitted on:  July 27, 2017 You last received care in the:  [facility] Pediatric Cardiovascular ICU    You were discharged on:  July 28, 2017        Reason for your hospital stay       Post catheterization                  Who to Call     For medical emergencies, please call 911.  For non-urgent questions about your medical care, please call your primary care provider or clinic, [phone]  For questions related to your surgery, please call your surgery clinic        Attending Provider     Provider Specialty    Marisa Jessica MD Pediatric Cardiology       Primary Care Provider Office Phone # Fax #    Hamzah Atkins [phone]      After Care Instructions     Activity       Resume regular activity            Diet       Resume regular home diet                  Follow-up Appointments     Follow Up and recommended labs and tests       Please follow up with PCP within a week.    Please follow up with primary cardiologist Dr. Breanna Sanchez in one month.                  Pending Results     Date and Time Order Name Status Description    7/26/2017 0000 EKG CARDIAC - HIM SCAN Preliminary             Statement of Approval     Ordered          07/28/17 1105  I have reviewed and agree with all the recommendations and orders detailed in this document.  EFFECTIVE NOW     Approved and electronically  "signed by:  Chan Ziegler MD             Admission Information     Date & Time Provider Department Dept. Phone    2017 Marisa Jessica MD [facility] Pediatric Cardiovascular [phone]      Your Vitals Were     Blood Pressure Temperature Respirations Height Weight Pulse Oximetry    125/80 98.4  F (36.9  C) (Oral) 23 1.759 m (5' 9.25\") 90.2 kg (198 lb 13.7 oz) 96%    BMI (Body Mass Index)                   29.15 kg/m2           MyChart Information     [employer] lets you send messages to your doctor, view your test results, renew your prescriptions, schedule appointments and more. To sign up, go to [email or web address] . Click on \"Log in\" on the left side of the screen, which will take you to the Welcome page. Then click on \"Sign up Now\" on the right side of the page.     You will be asked to enter the access code listed below, as well as some personal information. Please follow the directions to create your username and password.     Your access code is: [email or web address]  Expires: 10/26/2017 11:09 AM     Your access code will  in 90 days. If you need help or a new code, please call your [facility] clinic or [phone].        Care EveryWhere ID     This is your Care EveryWhere ID. This could be used by other organizations to access your [facility] medical records  [id number]        Equal Access to Services     ILDA HOROWITZ AH: Hadii kathy hernandezo Soclarissa, waaxda luqadaha, qaybta kaalmada melonie, jerrica roberts . So [facility] [phone].    ATENCIÓN: Si habla español, tiene a bonilla disposición servicios gratuitos de asistencia lingüística. Llame al [phone].    We comply with applicable federal civil rights laws and [place] laws. We do not discriminate on the basis of race, color, national origin, age, disability sex, sexual orientation or gender identity.               Review of your medicines      START taking  "       Dose / Directions    acetaminophen 500 MG tablet   Commonly known as:  TYLENOL        Dose:  1000 mg   Take 2 tablets (1,000 mg) by mouth every 6 hours as needed for mild pain or fever   Quantity:  50 tablet   Refills:  1       ibuprofen 200 MG tablet   Commonly known as:  ADVIL/MOTRIN        Dose:  800 mg   Take 4 tablets (800 mg) by mouth every 6 hours as needed for mild pain   Quantity:  100 tablet   Refills:  1         CONTINUE these medicines which have NOT CHANGED        Dose / Directions    diltiazem 240 MG 24 hr ER beaded capsule   Commonly known as:  TIAZAC   Used for:  Tachycardia        Dose:  240 mg   Take 1 capsule (240 mg) by mouth daily   Quantity:  30 capsule   Refills:  11            Where to get your medicines      Some of these will need a paper prescription and others can be bought over the counter. Ask your nurse if you have questions.     Bring a paper prescription for each of these medications     acetaminophen 500 MG tablet    ibuprofen 200 MG tablet                Protect others around you: Learn how to safely use, store and throw away your medicines at www.disposemymeds.org.             Medication List: This is a list of all your medications and when to take them. Check marks below indicate your daily home schedule. Keep this list as a reference.      Medications           Morning Afternoon Evening Bedtime As Needed    acetaminophen 500 MG tablet   Commonly known as:  TYLENOL   Take 2 tablets (1,000 mg) by mouth every 6 hours as needed for mild pain or fever   Last time this was given:  1,000 mg on 7/28/2017 11:33 AM                                diltiazem 240 MG 24 hr ER beaded capsule   Commonly known as:  TIAZAC   Take 1 capsule (240 mg) by mouth daily                                ibuprofen 200 MG tablet   Commonly known as:  ADVIL/MOTRIN   Take 4 tablets (800 mg) by mouth every 6 hours as needed for mild pain   Last time this was given:  800 mg on 7/28/2017 10:15 AM

## 2017-07-27 NOTE — DISCHARGE SUMMARY
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 right and retrograde left heart cath  2. angiography  3. Balloon dilation of tricuspid valve with Tyshak II 25 mm x 5 cm balloon and Z med II 25 mm x 4 cm balloon   Staff Dr. Kulkarni   Assistant(s) Abbi Carroll MD, Angelita Pratt   Anesthesia general anesthesia via LMA and local with 1% lidocaine   Access 12 F RFV, 12 F RIJ, 7 F LFV, 5 F LFA    Specimens  None   IV contrast 129 mL   Heparinized Yes   Blood loss 20 mL   Complications None       Preliminary findings:     Baseline  RA filling pressures high mean 14  RV 23/3/7  CO 3.97 L/min  Tricuspid valve area 0.64 cm2  Calculated mean tricuspid valve gradient 11 mmHg     After balloon dilation and on inotrope Dopamine  CO 7.25 L/min  Tricuspid valve area improved to 1.81 cm2  Calculated mean tricuspid valve gradient 5.3 mmHg           Pending Results:   Echo results from 7/28 - preliminary read with pressure gradient 8 mmHg across valve            Discharge Weight and Vitals:   Blood pressure 115/75, temperature 98.3  F (36.8  C), temperature source Oral, resp. rate 16, height 1.759 m (5' 9.25\"), weight 90.2 kg (198 lb 13.7 oz), SpO2 100 %.         Follow-Up " Appointments:   Primary Care Provider: Within one week  Primary Cardiologist:  1 month(s)         Wound Care, Monitoring, and Other Instructions:     Watch the right groin and left groin site closely for any bleeding, swelling, redness, discharge, or change in color/temperature/sensation of the L Leg and R leg.  Additionally, monitor the right neck cath site for any changes as well.     Call immediately if there is bleeding or fever    Keep the site clean and dry    You may leave the site uncovered; if you want to cover it with a band-aid be sure to change the band-aid any time it gets wet or dirty    Avoid vigorous activity for 48 hours to reduce the risk of bleeding from the site    Do not soak the site (bathe or swim) for 48 hours; okay to shower or sponge-bathe after 24 hours    If you have any questions about the site, either your primary care provider or your cardiologist can examine it    To reach [facility] cardiologist at any time please call [phone] (M-F 7:30 AM- 4:30 PM) or [phone] and ask for the on-call pediatric cardiologist (anytime)

## 2017-07-27 NOTE — IP AVS SNAPSHOT
[facility] Pediatric Cardiovascular ICU    [address] [address]    Phone:  [phone]                                       After Visit Summary   7/27/2017    Xavi Mtz    MRN: [id number]           After Visit Summary Signature Page     I have received my discharge instructions, and my questions have been answered. I have discussed any challenges I see with this plan with the nurse or doctor.    ..........................................................................................................................................  Patient/Patient Representative Signature      ..........................................................................................................................................  Patient Representative Print Name and Relationship to Patient    ..................................................               ................................................  Date                                            Time    ..........................................................................................................................................  Reviewed by Signature/Title    ...................................................              ..............................................  Date                                                            Time

## 2017-07-27 NOTE — LETTER
Transition Communication Hand-off for Care Transitions to Next Level of Care Provider    Name: Xavi Mtz  MRN #: [id number]  Primary Care Provider: Hamzah Atkins     Primary Clinic: [facility] [address] [address]     Reason for Hospitalization:  Ebstein's anomaly [Q22.5]  Admit Date/Time: 7/27/2017  9:03 AM  Discharge Date: 07/28/17    Payor Source: Pay[place] / [place] / Product Type: Medicaid /          Reason for Communication Hand-off Referral: Fragility    Discharge Plan:  See AVS    Key Recommendations:    Not sure if he is your pt. But, peds cards follows him.    Taylor Stephens RN  Care Coordinator  Pager: [phone]      AVS/Discharge Summary is the source of truth; this is a helpful guide for improved communication of patient story

## 2017-07-28 ENCOUNTER — APPOINTMENT (OUTPATIENT)
Dept: CARDIOLOGY | Facility: CLINIC | Age: 27
End: 2017-07-28
Attending: PEDIATRICS
Payer: MEDICAID

## 2017-07-28 VITALS
SYSTOLIC BLOOD PRESSURE: 125 MMHG | RESPIRATION RATE: 23 BRPM | DIASTOLIC BLOOD PRESSURE: 80 MMHG | TEMPERATURE: 98.4 F | OXYGEN SATURATION: 96 % | BODY MASS INDEX: 29.45 KG/M2 | WEIGHT: 198.85 LBS | HEIGHT: 69 IN

## 2017-07-28 PROBLEM — M54.2 NECK PAIN: Status: ACTIVE | Noted: 2017-07-28

## 2017-07-28 LAB
BASE DEFICIT BLDA-SCNC: 0.8 MMOL/L
BASE DEFICIT BLDA-SCNC: 2.1 MMOL/L
BASE DEFICIT BLDV-SCNC: 1.8 MMOL/L
CA-I BLD-MCNC: 4.8 MG/DL (ref 4.4–5.2)
CA-I BLD-MCNC: 5.3 MG/DL (ref 4.4–5.2)
CA-I BLD-MCNC: 5.4 MG/DL (ref 4.4–5.2)
GLUCOSE BLD-MCNC: 81 MG/DL (ref 70–99)
GLUCOSE BLD-MCNC: 85 MG/DL (ref 70–99)
GLUCOSE BLD-MCNC: 86 MG/DL (ref 70–99)
HCO3 BLD-SCNC: 24 MMOL/L (ref 21–28)
HCO3 BLD-SCNC: 26 MMOL/L (ref 21–28)
HCO3 BLDV-SCNC: 25 MMOL/L (ref 21–28)
HGB BLD-MCNC: 13 G/DL (ref 13.3–17.7)
HGB BLD-MCNC: 13.2 G/DL (ref 13.3–17.7)
HGB BLD-MCNC: 13.7 G/DL (ref 13.3–17.7)
LACTATE BLD-SCNC: 0.4 MMOL/L (ref 0.7–2.1)
LACTATE BLD-SCNC: 0.4 MMOL/L (ref 0.7–2.1)
LACTATE BLD-SCNC: 0.8 MMOL/L (ref 0.7–2.1)
O2/TOTAL GAS SETTING VFR VENT: 100 %
O2/TOTAL GAS SETTING VFR VENT: 100 %
O2/TOTAL GAS SETTING VFR VENT: 21 %
PCO2 BLD: 48 MM HG (ref 35–45)
PCO2 BLD: 50 MM HG (ref 35–45)
PCO2 BLDV: 51 MM HG (ref 40–50)
PH BLD: 7.31 PH (ref 7.35–7.45)
PH BLD: 7.32 PH (ref 7.35–7.45)
PH BLDV: 7.3 PH (ref 7.32–7.43)
PO2 BLD: ABNORMAL MM HG (ref 80–105)
PO2 BLD: ABNORMAL MM HG (ref 80–105)
PO2 BLDV: ABNORMAL MM HG (ref 25–47)
POTASSIUM BLD-SCNC: 3.7 MMOL/L (ref 3.4–5.3)
POTASSIUM BLD-SCNC: 3.8 MMOL/L (ref 3.4–5.3)
POTASSIUM BLD-SCNC: 3.8 MMOL/L (ref 3.4–5.3)
SODIUM BLD-SCNC: 141 MMOL/L (ref 133–144)
SODIUM BLD-SCNC: 143 MMOL/L (ref 133–144)
SODIUM BLD-SCNC: 144 MMOL/L (ref 133–144)

## 2017-07-28 PROCEDURE — 40000275 ZZH STATISTIC RCP TIME EA 10 MIN

## 2017-07-28 PROCEDURE — G0378 HOSPITAL OBSERVATION PER HR: HCPCS

## 2017-07-28 PROCEDURE — 25000132 ZZH RX MED GY IP 250 OP 250 PS 637: Performed by: PEDIATRICS

## 2017-07-28 PROCEDURE — 25000128 H RX IP 250 OP 636: Performed by: PEDIATRICS

## 2017-07-28 PROCEDURE — 25000132 ZZH RX MED GY IP 250 OP 250 PS 637: Performed by: NURSE PRACTITIONER

## 2017-07-28 PROCEDURE — 25000132 ZZH RX MED GY IP 250 OP 250 PS 637: Performed by: STUDENT IN AN ORGANIZED HEALTH CARE EDUCATION/TRAINING PROGRAM

## 2017-07-28 PROCEDURE — 93306 TTE W/DOPPLER COMPLETE: CPT

## 2017-07-28 RX ORDER — MORPHINE SULFATE 2 MG/ML
4 INJECTION, SOLUTION INTRAMUSCULAR; INTRAVENOUS ONCE
Status: COMPLETED | OUTPATIENT
Start: 2017-07-28 | End: 2017-07-28

## 2017-07-28 RX ORDER — DILTIAZEM HYDROCHLORIDE 120 MG/1
240 CAPSULE, COATED, EXTENDED RELEASE ORAL DAILY
Status: DISCONTINUED | OUTPATIENT
Start: 2017-07-29 | End: 2017-07-28 | Stop reason: HOSPADM

## 2017-07-28 RX ORDER — IBUPROFEN 800 MG/1
800 TABLET, FILM COATED ORAL ONCE
Status: COMPLETED | OUTPATIENT
Start: 2017-07-28 | End: 2017-07-28

## 2017-07-28 RX ORDER — ACETAMINOPHEN 500 MG
1000 TABLET ORAL EVERY 6 HOURS PRN
Qty: 50 TABLET | Refills: 1 | Status: SHIPPED | OUTPATIENT
Start: 2017-07-28 | End: 2020-01-09

## 2017-07-28 RX ORDER — PANTOPRAZOLE SODIUM 40 MG/1
40 TABLET, DELAYED RELEASE ORAL EVERY MORNING
Status: DISCONTINUED | OUTPATIENT
Start: 2017-07-28 | End: 2017-07-28 | Stop reason: HOSPADM

## 2017-07-28 RX ORDER — OXYCODONE HYDROCHLORIDE 5 MG/1
10 TABLET ORAL ONCE
Status: DISCONTINUED | OUTPATIENT
Start: 2017-07-28 | End: 2017-07-28

## 2017-07-28 RX ORDER — OXYCODONE HCL 10 MG/1
10 TABLET, FILM COATED, EXTENDED RELEASE ORAL ONCE
Status: DISCONTINUED | OUTPATIENT
Start: 2017-07-28 | End: 2017-07-28

## 2017-07-28 RX ORDER — IBUPROFEN 800 MG/1
800 TABLET, FILM COATED ORAL EVERY 6 HOURS PRN
Status: DISCONTINUED | OUTPATIENT
Start: 2017-07-28 | End: 2017-07-28 | Stop reason: HOSPADM

## 2017-07-28 RX ORDER — IBUPROFEN 200 MG
800 TABLET ORAL EVERY 6 HOURS PRN
Qty: 100 TABLET | Refills: 1 | Status: SHIPPED | OUTPATIENT
Start: 2017-07-28 | End: 2020-01-09

## 2017-07-28 RX ORDER — ACETAMINOPHEN 500 MG
1000 TABLET ORAL EVERY 6 HOURS PRN
Status: DISCONTINUED | OUTPATIENT
Start: 2017-07-28 | End: 2017-07-28 | Stop reason: HOSPADM

## 2017-07-28 RX ORDER — KETOROLAC TROMETHAMINE 30 MG/ML
30 INJECTION, SOLUTION INTRAMUSCULAR; INTRAVENOUS ONCE
Status: COMPLETED | OUTPATIENT
Start: 2017-07-28 | End: 2017-07-28

## 2017-07-28 RX ORDER — DILTIAZEM HYDROCHLORIDE 120 MG/1
240 CAPSULE, COATED, EXTENDED RELEASE ORAL DAILY
Status: DISCONTINUED | OUTPATIENT
Start: 2017-07-28 | End: 2017-07-28

## 2017-07-28 RX ADMIN — MORPHINE SULFATE 4 MG: 2 INJECTION, SOLUTION INTRAMUSCULAR; INTRAVENOUS at 00:39

## 2017-07-28 RX ADMIN — KETOROLAC TROMETHAMINE 30 MG: 30 INJECTION, SOLUTION INTRAMUSCULAR; INTRAVENOUS at 06:13

## 2017-07-28 RX ADMIN — DILTIAZEM HYDROCHLORIDE 240 MG: 120 CAPSULE, EXTENDED RELEASE ORAL at 07:56

## 2017-07-28 RX ADMIN — ACETAMINOPHEN 650 MG: 325 TABLET, FILM COATED ORAL at 05:46

## 2017-07-28 RX ADMIN — IBUPROFEN 800 MG: 800 TABLET ORAL at 10:15

## 2017-07-28 RX ADMIN — PANTOPRAZOLE SODIUM 40 MG: 40 TABLET, DELAYED RELEASE ORAL at 06:12

## 2017-07-28 RX ADMIN — ACETAMINOPHEN 1000 MG: 500 TABLET ORAL at 11:33

## 2017-07-28 NOTE — PROGRESS NOTES
"Goals adequate for discharge:  1.  No bleeding from cath sites x 3  2.  Patient verbalizes rating \"5\" acceptable goal for discharge.  Pain has improved from 7 to 5 after motrin PO and increased tylenol dosing.  Alternating tylenol/motrin teaching performed to utilize at home  3.  VSS per baseline  4.  Echo complete    Patient verbalizes understanding of f/u w/Dr. Dawson in one month and PCP in one week.  Mother at bedside and accompanying home.  "

## 2017-07-28 NOTE — PROVIDER NOTIFICATION
Goals:    1.Dr. Kulkanri requests RN to hold ordered one time oxycodone dose for unresolved neck pain. Rt neck pain that feels like his muscles are being stretched and pulled. Patient verbalizing frustration with this order to hold.   Further discussion with patient and team during rounds. Will now plan to attempt motrin 800 mg x1 and reevaluate.  Patient verbalizes agreement with new plan.    2. Echo currently in progress  3.  No evidence of bleeding/hematoma at cath sites x 3  4.  Tolerating PO and voiding  5.  VSS per baseline

## 2017-07-28 NOTE — PROGRESS NOTES
Observation Goals:  1.  No evidence of bleeding at cath entry sites x 3 (R/L groin, R neck)  2.  Rt neck pain persists at cath site/addressing with team currently  3.  Tolerating regular diet and voiding  4.  Vital signs stable & at baseline  5.  Awaiting Echo prior to DC home

## 2017-07-28 NOTE — PROVIDER NOTIFICATION
"Ronal, CVICU fellow notified of neck pain rated \"5\"; awaiting orders; attempting ice pack and will reevaluate.  "

## 2017-07-28 NOTE — PLAN OF CARE
Problem: Goal Outcome Summary  Goal: Goal Outcome Summary  Outcome: Improving  Met all discharge criteria, awaiting echo this morning prior to discharge.  Bothered by neck pains rated as high as 8.  Responds to toradol and morphine.

## 2017-07-28 NOTE — PROGRESS NOTES
[facility]   CVICU Post Cardiac Catheterization Admission Note    Interval :  Had continued pain last night specifically in right neck cath site, got toradol x2, morphine, and then ibuprofen this morning on rounds.  No swelling or bleeding from sites, was able to take good PO.    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  Presenting for cardiac catheterization and balloon dilation after findings of increasing pressure gradients across valve and low CO.    During catheterization had low systolics to 60s requiring brief dopamine administration.  Patient arrived to CVICU on no cardiovascular medications, awake, and spontaneously breathing.    Problem List:  Patient Active Problem List    Diagnosis Date Noted     Ebstein's anomaly 03/24/2015     Priority: Medium     S/p tricuspid valve replacement 1999       SVT (supraventricular tachycardia) (H) 03/24/2015     Priority: Medium         Past Procedural History:  Past Surgical History:   Procedure Laterality Date     CARDIAC SURGERY       HEART CATH CHILD N/A 7/27/2017    Procedure: HEART CATH CHILD;  Right and Left Heart Cath;  Surgeon: Marisa Jessica MD;  Location: [facility] OR         Cardiac Catheterization Results:  Baseline  RA filling pressures high mean 15/17/14  RV 23/3/7  CI 3.97 L/min  Tricuspid valve area 0.64 cm2  Calculated mean mitral valve gradient 11 mmHg     After balloon dilation and on inotrope Dopamine  CI 7.25 L/min  Valve area 1.81 cm2  Calculated mean mitral valve gradient 5.3 mmHg      Interval History:  Patient arrived extubated on a backboard, with no s/sx of bleeding at cath site and stable hemodynamics.      Plan by Systems:    CV:  - Echo in AM  - monitor cath sites in groin b/l and right neck  - on home diltiazem, taking own  meds    RESP:   - RA    FEN/GI:   - Regular diet    HEME:   - H/H appropriate    ID:   -No active issues    ENDO:   -No active issues    CNS:   -Tylenol PRN for comfort   - ibuprofen 800 mg now       Vitals:  All vital signs reviewed    Temp:  [98  F (36.7  C)-98.4  F (36.9  C)] 98.4  F (36.9  C)  Heart Rate:  [58-81] 79  Resp:  [9-23] 23  BP: (110-123)/(66-76) 116/76  SpO2:  [95 %-100 %] 95 %  SpO2 Readings from Last 2 Encounters:   07/28/17 95%   06/27/17 99%           I/O last 3 completed shifts:  In: 1920.87 [P.O.:200; I.V.:1220.87]  Out: 1900 [Urine:1900]  I/O this shift:  In: 650 [P.O.:650]  Out: -     Medications:  All medications reviewed      Physical Exam  Awake, alert, NAD, interacting appropriately  CTAB, no increased WOB  HR in 60s, no murmur auscultated  Ab soft, NTND, no masses or HSM  Mild tenderness over right neck with some crepitus palpated immediately superior to insertion site  Bandages over right neck and left and right groin with no bleeding or hematomas  PT and DP palpated easily on both feet    Radiology:  All radiological studies reviewed    Laboratory:  All laboratory values reviewed    Chan Ziegler  PICU Fellow PGY-4  Pager [phone]       Pediatric Critical Care Attestation:     Patient is being discharged today from the ICU after admission for tricuspid stenosis which was ballooned open by Dr. Kulkarni yesterday. Overnight stay to monitor cath sites due to large neck catheter. This morning patient is having some pain at the right neck cath site-site looks clean without significant bruising or swelling but per patient's description may be a muscular spasm. We are giving high dose ibuprofen to treat which Xavi has agreed to try for the pain. Will be discharged home today with follow up per interventional cardiology.  I personally examined and evaluated the patient today, and have discussed plans with the resident/NP/PA/fellow and nurse. All physician orders and treatments were  placed at my direction.   Patient's weight today: 198 lbs 13.68 oz    The above plans and care have been discussed with Xavi.  I spent a total of  45  minutes providing hospital care at the bedside and on the unit, evaluating the patient, directing care and reviewing laboratory values and radiologic reports for this patient.    Alea Velasquez MD  CVICU attending

## 2017-07-28 NOTE — PROVIDER NOTIFICATION
Syeda Li MD notified of continued pain in the neck now extending to the jaw region.  No increased swelling or firmness noted, very tender to touch and described as cramping.  One time dose of morphine ordered.  Will continue to evaluate.

## 2017-07-28 NOTE — PROVIDER NOTIFICATION
Four hours post admission to unit, patient allowed OOB per order.  No bleeding from cath sites or swelling noted.  Steady upon standing.  Will continue to monitor.

## 2017-07-28 NOTE — PROVIDER NOTIFICATION
JAYLEN Hickey notified of increasing neck pain unrelieved by previous measures.  Order rec'd for PO oxycodone, noted, patient updated on new POC and he verbalized understanding.

## 2017-08-02 ENCOUNTER — TELEPHONE (OUTPATIENT)
Dept: CARDIOLOGY | Facility: CLINIC | Age: 27
End: 2017-08-02

## 2017-08-22 ENCOUNTER — TELEPHONE (OUTPATIENT)
Dept: OTHER | Facility: CLINIC | Age: 27
End: 2017-08-22

## 2017-08-22 NOTE — TELEPHONE ENCOUNTER
"Documentation from Dr. Sanchez:    \"I got a call re:  Xavi Morris Bibi today from a PMD up [place].  Xavi was in with a low grade fever and he had had some chills yesterday.  T max 102, not toxic, CXR OK.  WBC 7000, ESR 13.  Did not give any antibiotics, but told Xavi to let us know if he got worse.\"    Dr. Kulkarni also aware.   "

## 2017-10-02 ENCOUNTER — OFFICE VISIT (OUTPATIENT)
Dept: PEDIATRIC CARDIOLOGY | Facility: CLINIC | Age: 27
End: 2017-10-02

## 2017-10-02 VITALS
SYSTOLIC BLOOD PRESSURE: 125 MMHG | DIASTOLIC BLOOD PRESSURE: 80 MMHG | HEART RATE: 81 BPM | WEIGHT: 196.87 LBS | HEIGHT: 70 IN | BODY MASS INDEX: 28.18 KG/M2

## 2017-10-02 DIAGNOSIS — T82.897D: Primary | ICD-10-CM

## 2017-10-02 DIAGNOSIS — R00.0 TACHYCARDIA: ICD-10-CM

## 2017-10-02 DIAGNOSIS — Q22.5 EBSTEIN'S ANOMALY: ICD-10-CM

## 2017-10-02 RX ORDER — DILTIAZEM HYDROCHLORIDE 360 MG/1
360 CAPSULE, EXTENDED RELEASE ORAL DAILY
Qty: 90 CAPSULE | Refills: 3 | Status: SHIPPED | OUTPATIENT
Start: 2017-10-02 | End: 2018-04-02

## 2017-10-02 ASSESSMENT — PAIN SCALES - GENERAL: PAINLEVEL: NO PAIN (0)

## 2017-10-02 NOTE — PROGRESS NOTES
2017             Hamzah Atkins MD   [address] [address]      RE: Xavi Mtz   MRN: [id number]   : 1990      Dear Dr. Atkins:       I had the pleasure of seeing your 27-year-old patient Xavi for cardiac followup on 10/02/2017.  He is a young man with Ebstein anomaly of the tricuspid valve who had a Hoda-Brantley 33 mm prosthetic tricuspid valve placed in  by Micheal Mazariegos at the [facility].  Over the past few years, there has been a gradual increase in his tricuspid valve gradient from 6-15 mmHg.  He also has a right ventricular enlargement and an abnormal right ventricular systolic function as is common with Ebstein.  He did undergo cardiac catheterization and balloon angioplasty of his tricuspid valve in 2017.  Post procedure, his tricuspid valve gradient was 8 mmHg.  He is here today for followup of that procedure.  He has a new job working as a .  He does not do any heavy lifting.  He thinks he does not have much energy.  His mother thinks he is doing better than he was prior to his angioplasty.  He will be looking for a different job that pays more.  He tells me he gets tired chasing his son around, but I do not believe that Xavi does any regular physical activity.      As far as his tachycardia goes, he continues to have a few short episodes a month and perhaps one that lasts as long as an hour.  I do not really think it limits him.  He has had a good response to oral diltiazem.      I spent more than 25 minutes, all in direct face-to-face discussion with Xavi and his mother today.  We reviewed the possibilities as they relate to his prosthetic valve.  I discussed with him the possible benefits of taking a baby aspirin 81 mg daily, and he was agreeable to doing that.  I also thought that we should increase his diltiazem to 360 mg a day.  He was also agreeable to doing that.  Today his echo noted an 8 mm  gradient across the tricuspid valve, which is stable.  We agreed to see how things look in 6 months.  I encouraged regular physical activity.  He and his mom had a chance to have their questions answered.      I appreciate the opportunity to participate in Xavi's care.  Please let me know if I can provide any other information for you.      Sincerely,      Breanna Sanchez MD

## 2017-10-02 NOTE — LETTER
10/2/2017      RE: Xavi Mtz  [address] [facility] [address]       2017             Hamzah Atkins MD   [address] [address]      RE: Xavi Mtz   MRN: [id number]   : 1990      Dear Dr. Atkins:       I had the pleasure of seeing your 27-year-old patient Xavi for cardiac followup on 10/02/2017.  He is a young man with Ebstein anomaly of the tricuspid valve who had a Hoda-Brantley 33 mm prosthetic tricuspid valve placed in  by Micheal Mazariegos at the [facility].  Over the past few years, there has been a gradual increase in his tricuspid valve gradient from 6-15 mmHg.  He also has a right ventricular enlargement and an abnormal right ventricular systolic function as is common with Ebstein.  He did undergo cardiac catheterization and balloon angioplasty of his tricuspid valve in 2017.  Post procedure, his tricuspid valve gradient was 8 mmHg.  He is here today for followup of that procedure.  He has a new job working as a .  He does not do any heavy lifting.  He thinks he does not have much energy.  His mother thinks he is doing better than he was prior to his angioplasty.  He will be looking for a different job that pays more.  He tells me he gets tired chasing his son around, but I do not believe that Xavi does any regular physical activity.      As far as his tachycardia goes, he continues to have a few short episodes a month and perhaps one that lasts as long as an hour.  I do not really think it limits him.  He has had a good response to oral diltiazem.      I spent more than 25 minutes, all in direct face-to-face discussion with Xavi and his mother today.  We reviewed the possibilities as they relate to his prosthetic valve.  I discussed with him the possible benefits of taking a baby aspirin 81 mg daily, and he was agreeable to doing that.  I also thought that we should increase his  diltiazem to 360 mg a day.  He was also agreeable to doing that.  Today his echo noted an 8 mm gradient across the tricuspid valve, which is stable.  We agreed to see how things look in 6 months.  I encouraged regular physical activity.  He and his mom had a chance to have their questions answered.      I appreciate the opportunity to participate in Xavi's care.  Please let me know if I can provide any other information for you.      Sincerely,      Breanna Sanchez MD

## 2017-10-02 NOTE — MR AVS SNAPSHOT
After Visit Summary   10/2/2017    Xavi Mtz    MRN: [id number]           Patient Information     Date Of Birth          1990        Visit Information        Provider Department      10/2/2017 10:45 AM Breanna Sanchez MD [facility]        Today's Diagnoses     Prosthetic cardiac valve calcification, subsequent encounter    -  1    Tachycardia          Care Instructions    [facility]  Pediatric Specialty Clinic [place]      Pediatric Call Center [phone], option 1  Sobia Vega RN Care Coordinator:  [phone]    After Hours Emergency:  [phone].  Ask for the on-call doctor for the specialty you are calling for be paged.    Prescription Renewals:  Your pharmacy must fax requests to [phone].  Please allow 2-3 days for prescriptions to be authorized.    If your physician has ordered an x-ray or MRI, you may schedule this test by calling [facility] in [place] at [phone].          Follow-ups after your visit        Follow-up notes from your care team     Return in about 6 months (around 2018), or echo, ekg.      Your next 10 appointments already scheduled     2018  7:30 AM CDT   Echo [place] [facility] Only with MUSE [facility] PEDS CARD ECHO ROOM   [facility] ([facility])    [address]  [address] [place] [address] [phone]            2018  8:30 AM CDT   Return Visit with Breanna Sanchez MD   [facility]    [address]   [address] [place] [id number] [phone]              Who to contact     Please call your clinic at [phone] to:    Ask questions about your health    Make or cancel appointments    Discuss your medicines    Learn about your test results    Speak to your doctor   If you have compliments or concerns  "about an experience at your clinic, or if you wish to file a complaint, please contact [facility] Patient Relations at [phone] or email us at [email or web address]         Additional Information About Your Visit        [employer] Information     [employer] is an electronic gateway that provides easy, online access to your medical records. With [employer], you can request a clinic appointment, read your test results, renew a prescription or communicate with your care team.     To sign up for [employer] visit the website at [email or web address]   You will be asked to enter the access code listed below, as well as some personal information. Please follow the directions to create your username and password.     Your access code is: [email or web address]  Expires: 10/26/2017 11:09 AM     Your access code will  in 90 days. If you need help or a new code, please contact your [facility] Clinic or call [phone] for assistance.        Care EveryWhere ID     This is your Care EveryWhere ID. This could be used by other organizations to access your [place] medical records  [id number]        Your Vitals Were     Pulse Height BMI (Body Mass Index)             81 5' 9.69\" (177 cm) 28.5 kg/m2          Blood Pressure from Last 3 Encounters:   10/02/17 125/80   17 125/80   17 122/84    Weight from Last 3 Encounters:   10/02/17 196 lb 13.9 oz (89.3 kg)   17 198 lb 13.7 oz (90.2 kg)   17 197 lb 5 oz (89.5 kg)              Today, you had the following     No orders found for display         Today's Medication Changes          These changes are accurate as of: 10/2/17 11:02 AM.  If you have any questions, ask your nurse or doctor.               Start taking these medicines.        Dose/Directions    aspirin 81 MG tablet   Used for:  Prosthetic cardiac valve calcification, subsequent encounter   Started by:  Breanna Sanchez MD        Dose:  81 " mg   Take 1 tablet (81 mg) by mouth daily   Quantity:  90 tablet   Refills:  3         These medicines have changed or have updated prescriptions.        Dose/Directions    diltiazem 360 MG 24 hr capsule   Commonly known as:  TIAZAC   This may have changed:    - medication strength  - how much to take   Used for:  Tachycardia   Changed by:  Breanna Sanchez MD        Dose:  360 mg   Take 1 capsule (360 mg) by mouth daily   Quantity:  90 capsule   Refills:  3            Where to get your medicines      These medications were sent to [facility][id number] - [facility] [address] [facility]  [address] [facility] [address]    Hours:  Closed 10-16-08 business to [facility] Phone:  [phone]     diltiazem 360 MG 24 hr capsule         Some of these will need a paper prescription and others can be bought over the counter.  Ask your nurse if you have questions.     Bring a paper prescription for each of these medications     aspirin 81 MG tablet                Primary Care Provider Office Phone # Fax #    Hamzah Atkins [phone]       [facility] [address] [facility] [address]        Equal Access to Services     INDERJIT HOROWITZ : Hadii kathy pagan Soclarissa, waaxda lucristino, qaybta kaalmiladis wilhelm, jerrica mei. So [facility] [phone].    ATENCIÓN: Si habla español, tiene a bonilla disposición servicios gratuitos de asistencia lingüística. [facility] [phone].    We comply with applicable federal civil rights laws and [place] laws. We do not discriminate on the basis of race, color, national origin, age, disability, sex, sexual orientation, or gender identity.            Thank you!     Thank you for choosing [facility]  for your care. Our goal is always to provide you with excellent care. Hearing back from our patients is one way we can continue to improve our services. Please take a few  minutes to complete the written survey that you may receive in the mail after your visit with us. Thank you!             Your Updated Medication List - Protect others around you: Learn how to safely use, store and throw away your medicines at www.disposemymeds.org.          This list is accurate as of: 10/2/17 11:02 AM.  Always use your most recent med list.                   Brand Name Dispense Instructions for use Diagnosis    acetaminophen 500 MG tablet    TYLENOL    50 tablet    Take 2 tablets (1,000 mg) by mouth every 6 hours as needed for mild pain or fever    Neck pain       aspirin 81 MG tablet     90 tablet    Take 1 tablet (81 mg) by mouth daily    Prosthetic cardiac valve calcification, subsequent encounter       diltiazem 360 MG 24 hr capsule    TIAZAC    90 capsule    Take 1 capsule (360 mg) by mouth daily    Tachycardia       ibuprofen 200 MG tablet    ADVIL/MOTRIN    100 tablet    Take 4 tablets (800 mg) by mouth every 6 hours as needed for mild pain    Neck pain

## 2017-10-02 NOTE — NURSING NOTE
"Chief Complaint   Patient presents with     Heart Problem     Return visit for SVT, ebsteins anomaly, tachycardia       Initial /80 (BP Location: Right arm, Patient Position: Chair, Cuff Size: Adult Large)  Pulse 81  Ht 5' 9.69\" (177 cm)  Wt 196 lb 13.9 oz (89.3 kg)  BMI 28.5 kg/m2 Estimated body mass index is 28.5 kg/(m^2) as calculated from the following:    Height as of this encounter: 5' 9.69\" (177 cm).    Weight as of this encounter: 196 lb 13.9 oz (89.3 kg).  Medication Reconciliation: incomplete    "

## 2018-04-02 ENCOUNTER — OFFICE VISIT (OUTPATIENT)
Dept: PEDIATRIC CARDIOLOGY | Facility: CLINIC | Age: 28
End: 2018-04-02
Payer: COMMERCIAL

## 2018-04-02 ENCOUNTER — RADIANT APPOINTMENT (OUTPATIENT)
Dept: CARDIOLOGY | Facility: CLINIC | Age: 28
End: 2018-04-02
Payer: COMMERCIAL

## 2018-04-02 VITALS
HEART RATE: 85 BPM | SYSTOLIC BLOOD PRESSURE: 118 MMHG | DIASTOLIC BLOOD PRESSURE: 77 MMHG | HEIGHT: 69 IN | BODY MASS INDEX: 28.24 KG/M2 | RESPIRATION RATE: 20 BRPM | WEIGHT: 190.7 LBS

## 2018-04-02 DIAGNOSIS — T82.897D: ICD-10-CM

## 2018-04-02 DIAGNOSIS — R00.0 TACHYCARDIA: ICD-10-CM

## 2018-04-02 DIAGNOSIS — Q22.5 EBSTEIN'S ANOMALY: ICD-10-CM

## 2018-04-02 DIAGNOSIS — Q22.5 EBSTEIN'S ANOMALY: Primary | ICD-10-CM

## 2018-04-02 LAB — INTERPRETATION ECG - MUSE: NORMAL

## 2018-04-02 RX ORDER — DILTIAZEM HYDROCHLORIDE 360 MG/1
360 CAPSULE, EXTENDED RELEASE ORAL DAILY
Qty: 90 CAPSULE | Refills: 3 | Status: SHIPPED | OUTPATIENT
Start: 2018-04-02 | End: 2018-10-01

## 2018-04-02 ASSESSMENT — PAIN SCALES - GENERAL: PAINLEVEL: NO PAIN (0)

## 2018-04-02 NOTE — MR AVS SNAPSHOT
After Visit Summary   2018    Xavi Mtz    MRN: [id number]           Patient Information     Date Of Birth          1990        Visit Information        Provider Department      2018 8:30 AM Breanna Sanchez MD [facility]        Today's Diagnoses     Ebstein's anomaly    -  1    Tachycardia          Care Instructions    [facility]  Pediatric Specialty Clinic [place]      Pediatric Call Center [phone], option 1  Sobia Vega RN Care Coordinator:  [phone]    After Hours Emergency:  [phone].  Ask for the on-call pediatric doctor for the specialty you are calling for be paged.    Prescription Renewals:  Your pharmacy must fax requests to [phone].  Please allow 2-3 days for prescriptions to be authorized.    If your physician has ordered an CT or MRI, you may schedule this test by calling [facility] in [place] at [phone].            Follow-ups after your visit        Follow-up notes from your care team     Return in about 6 months (around 10/2/2018) for Physical Exam, echo, ekg.      Your next 10 appointments already scheduled     Oct 01, 2018  8:15 AM CDT   Echo [place] [facility] Only with [facility] PEDS CARD ECHO ROOM   [facility] ([facility])    [address]  [address]  [facility] [address] [phone]            Oct 01, 2018  9:15 AM CDT   Return Visit with Breanna Sanchez MD   [facility] ([facility]    [address]  [address]  [facility] [address] [phone]              Who to contact     Please call your clinic at [phone] to:    Ask questions about your health    Make or cancel appointments    Discuss your medicines    Learn about your test results    Speak to your doctor            Additional Information About Your Visit       "  MyChart Information     [employer] is an electronic gateway that provides easy, online access to your medical records. With [employer], you can request a clinic appointment, read your test results, renew a prescription or communicate with your care team.     To sign up for [employer] visit the website at [email or web address]   You will be asked to enter the access code listed below, as well as some personal information. Please follow the directions to create your username and password.     Your access code is: [email or web address]  Expires: 2018  7:10 AM     Your access code will  in 90 days. If you need help or a new code, please contact your [facility] or call [phone] for assistance.        Care EveryWhere ID     This is your Care EveryWhere ID. This could be used by other organizations to access your [place] medical records  [id number]        Your Vitals Were     Pulse Respirations Height BMI (Body Mass Index)          85 20 5' 9.49\" (176.5 cm) 27.77 kg/m2         Blood Pressure from Last 3 Encounters:   18 118/77   10/02/17 125/80   17 125/80    Weight from Last 3 Encounters:   18 190 lb 11.2 oz (86.5 kg)   10/02/17 196 lb 13.9 oz (89.3 kg)   17 198 lb 13.7 oz (90.2 kg)              We Performed the Following     EKG 12-lead complete w/read - Same Day          Where to get your medicines      These medications were sent to [facility][id number] - [facility] [address] [address]    Hours:  Closed 10-16-08 business to [facility] Phone:  [phone]     diltiazem 360 MG 24 hr capsule          Primary Care Provider Office Phone # Fax #    Hamzah Atkins [phone]       [facility] [address] [facility] [address]        Equal Access to Services     ILDA HOROWITZ : Maida Hutchinson, allen alvarado, qaybta kaalmada jerrica wilhelm" lolita roberts ah. So [facility] [phone].    ATENCIÓN: Si jose elias pelletier, tiene a bonilla disposición servicios gratuitos de asistencia lingüística. Matthias choe [phone].    We comply with applicable federal civil rights laws and [place] laws. We do not discriminate on the basis of race, color, national origin, age, disability, sex, sexual orientation, or gender identity.            Thank you!     Thank you for choosing [facility]  for your care. Our goal is always to provide you with excellent care. Hearing back from our patients is one way we can continue to improve our services. Please take a few minutes to complete the written survey that you may receive in the mail after your visit with us. Thank you!             Your Updated Medication List - Protect others around you: Learn how to safely use, store and throw away your medicines at www.disposemymeds.org.          This list is accurate as of 4/2/18  9:03 AM.  Always use your most recent med list.                   Brand Name Dispense Instructions for use Diagnosis    acetaminophen 500 MG tablet    TYLENOL    50 tablet    Take 2 tablets (1,000 mg) by mouth every 6 hours as needed for mild pain or fever    Neck pain       aspirin 81 MG tablet     90 tablet    Take 1 tablet (81 mg) by mouth daily    Prosthetic cardiac valve calcification, subsequent encounter       diltiazem 360 MG 24 hr capsule    TIAZAC    90 capsule    Take 1 capsule (360 mg) by mouth daily    Tachycardia       ibuprofen 200 MG tablet    ADVIL/MOTRIN    100 tablet    Take 4 tablets (800 mg) by mouth every 6 hours as needed for mild pain    Neck pain

## 2018-04-02 NOTE — LETTER
2018      RE: Xavi Mtz  [address] [facility] [address]       2018            Hamzah Atkins MD   [address] [address]      RE: Xavi Mtz   MRN: [id number]   : 1990      Dear Dr. Atkins:       I had the pleasure of seeing your 27-year-old patient Xavi for cardiac followup on 2018.  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  Over the past few years, there has been a gradual increase in his tricuspid valve gradient from 6-15 mmHg.  He also has right ventricular enlargement and abnormal right ventricular systolic function as is common with Ebstein anomaly.  He underwent cardiac catheterization and balloon angioplasty of his tricuspid valve in 2017 by Dr. Guru Kulkarni at the [facility].  Post procedure, his tricuspid valve gradient was 8 mmHg.  I saw him for followup in 10/2017, at which time his gradient was 8 mmHg, and he is here for additional followup today.  He was working as a  prior to [place], but was let go from that job.  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  He seems to think that he will have some job security there.  His job does not involve any heavy lifting.  He tells me that he is occasionally lightheaded, but admits that this occurs in a situation where he has not had any breakfast or had anything to drink in the morning.  It is not when he is doing any real physical activity.  He continues to look for a better-paying job, and we discussed his employment possibilities.  He has a nearly 4-year-old son.  His other medical problem involves paroxysmal tachycardia.  For that, he takes diltiazem 360 mg, sustained release, daily.  He has been good about taking his medication.  In the past 6 months,  he has had a few short episodes of tachycardia and one 4 hour episode.  That one necessitated his going home from work.  He thinks that that sustained episode (which is quite unusual for him) was related to his being sick with a viral illness.  In other ways, the tachycardia really does not limit him.      PHYSICAL EXAMINATION:  An alert, oriented, acyanotic young man accompanied by his mother.  Blood pressure is 118/77, and the heart rate is 85.  Weight is 86.5 kg, and height is 176.5 cm.  Neck is supple without adenopathy.  Mucous membranes are pink.  Chest is clear.  Cardiac exam shows a quiet precordium with a normal first and widely split second heart sound.  There is no murmur.  Liver is not enlarged, and peripheral pulses are present.  There is no peripheral edema.  Neurologic exam is grossly intact.  There is no clubbing or cyanosis.      LABORATORY STUDIES:  Xavi had an electrocardiogram today, which showed sinus rhythm and right bundle branch block as it has in the past.  His echocardiogram was similar and showed a mean tricuspid valve gradient of 7 mmHg.  He had moderate right ventricular enlargement with low normal systolic function.  That would represent no significant interval change.      IMPRESSION:  I think at this point, Xavi seems stable from a cardiac point of view.  I am not certain we are going to make him better with the addition of a prosthetic valve.  For now, we agreed to continue to check on things every 6 months.  I refilled his diltiazem.  He will continue to take a baby aspirin 81 mg daily.  I have asked him to keep track of his tachycardia episodes.  We will follow up with an echo and EKG in 6 months.  He and his mom had a chance to have their questions answered.      I appreciate the opportunity to participate in Xavi's care.  Please let me know if I can provide any other information for you.      Sincerely,      Breanna Sanchez MD

## 2018-04-02 NOTE — NURSING NOTE
"Chief Complaint   Patient presents with     Heart Problem     Follow-up on        Initial /77 (BP Location: Right arm, Patient Position: Sitting, Cuff Size: Adult Large)  Pulse 85  Resp 20  Ht 5' 9.49\" (176.5 cm)  Wt 190 lb 11.2 oz (86.5 kg)  BMI 27.77 kg/m2 Estimated body mass index is 27.77 kg/(m^2) as calculated from the following:    Height as of this encounter: 5' 9.49\" (176.5 cm).    Weight as of this encounter: 190 lb 11.2 oz (86.5 kg).  Medication Reconciliation: complete  "

## 2018-04-02 NOTE — LETTER
Return to  Work Release    Date: 4/2/2018      Name: Xavi Mtz                       YOB: 1990    Medical Record Number: [id number]    The patient was seen at: [facility]            _________________________  Lucrecia Rico CMA

## 2018-04-02 NOTE — PATIENT INSTRUCTIONS
[facility]  Pediatric Specialty Clinic [place]      Pediatric Call Center [phone], option 1  Sobia Vega RN Care Coordinator:  [phone]    After Hours Emergency:  [phone].  Ask for the on-call pediatric doctor for the specialty you are calling for be paged.    Prescription Renewals:  Your pharmacy must fax requests to [phone].  Please allow 2-3 days for prescriptions to be authorized.    If your physician has ordered an CT or MRI, you may schedule this test by calling [facility] in [place] at [phone].

## 2018-04-02 NOTE — PROGRESS NOTES
2018            Hamzah Atkins MD   [address] [address]      RE: Xavi Mtz   MRN: [id number]   : 1990      Dear Dr. Atkins:       I had the pleasure of seeing your 27-year-old patient aXvi for cardiac followup on 2018.  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  Over the past few years, there has been a gradual increase in his tricuspid valve gradient from 6-15 mmHg.  He also has right ventricular enlargement and abnormal right ventricular systolic function as is common with Ebstein anomaly.  He underwent cardiac catheterization and balloon angioplasty of his tricuspid valve in 2017 by Dr. Guru Kulkarni at the [facility].  Post procedure, his tricuspid valve gradient was 8 mmHg.  I saw him for followup in 10/2017, at which time his gradient was 8 mmHg, and he is here for additional followup today.  He was working as a  prior to [place], but was let go from that job.  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  He seems to think that he will have some job security there.  His job does not involve any heavy lifting.  He tells me that he is occasionally lightheaded, but admits that this occurs in a situation where he has not had any breakfast or had anything to drink in the morning.  It is not when he is doing any real physical activity.  He continues to look for a better-paying job, and we discussed his employment possibilities.  He has a nearly 4-year-old son.  His other medical problem involves paroxysmal tachycardia.  For that, he takes diltiazem 360 mg, sustained release, daily.  He has been good about taking his medication.  In the past 6 months, he has had a few short episodes of tachycardia and one 4 hour episode.  That one  necessitated his going home from work.  He thinks that that sustained episode (which is quite unusual for him) was related to his being sick with a viral illness.  In other ways, the tachycardia really does not limit him.      PHYSICAL EXAMINATION:  An alert, oriented, acyanotic young man accompanied by his mother.  Blood pressure is 118/77, and the heart rate is 85.  Weight is 86.5 kg, and height is 176.5 cm.  Neck is supple without adenopathy.  Mucous membranes are pink.  Chest is clear.  Cardiac exam shows a quiet precordium with a normal first and widely split second heart sound.  There is no murmur.  Liver is not enlarged, and peripheral pulses are present.  There is no peripheral edema.  Neurologic exam is grossly intact.  There is no clubbing or cyanosis.      LABORATORY STUDIES:  Xavi had an electrocardiogram today, which showed sinus rhythm and right bundle branch block as it has in the past.  His echocardiogram was similar and showed a mean tricuspid valve gradient of 7 mmHg.  He had moderate right ventricular enlargement with low normal systolic function.  That would represent no significant interval change.      IMPRESSION:  I think at this point, Xavi seems stable from a cardiac point of view.  I am not certain we are going to make him better with the addition of a prosthetic valve.  For now, we agreed to continue to check on things every 6 months.  I refilled his diltiazem.  He will continue to take a baby aspirin 81 mg daily.  I have asked him to keep track of his tachycardia episodes.  We will follow up with an echo and EKG in 6 months.  He and his mom had a chance to have their questions answered.      I appreciate the opportunity to participate in Xavi's care.  Please let me know if I can provide any other information for you.      Sincerely,      Breanna Sanchez MD

## 2018-10-01 ENCOUNTER — RADIANT APPOINTMENT (OUTPATIENT)
Dept: CARDIOLOGY | Facility: CLINIC | Age: 28
End: 2018-10-01
Payer: COMMERCIAL

## 2018-10-01 ENCOUNTER — OFFICE VISIT (OUTPATIENT)
Dept: PEDIATRIC CARDIOLOGY | Facility: CLINIC | Age: 28
End: 2018-10-01
Payer: COMMERCIAL

## 2018-10-01 VITALS
HEIGHT: 69 IN | WEIGHT: 197.75 LBS | HEART RATE: 89 BPM | SYSTOLIC BLOOD PRESSURE: 106 MMHG | DIASTOLIC BLOOD PRESSURE: 72 MMHG | BODY MASS INDEX: 29.29 KG/M2

## 2018-10-01 DIAGNOSIS — R00.0 TACHYCARDIA: ICD-10-CM

## 2018-10-01 DIAGNOSIS — Q22.5 EBSTEIN'S ANOMALY: ICD-10-CM

## 2018-10-01 LAB — INTERPRETATION ECG - MUSE: NORMAL

## 2018-10-01 RX ORDER — DILTIAZEM HYDROCHLORIDE 360 MG/1
360 CAPSULE, EXTENDED RELEASE ORAL DAILY
Qty: 30 CAPSULE | Refills: 11 | Status: SHIPPED | OUTPATIENT
Start: 2018-10-01 | End: 2019-02-15

## 2018-10-01 ASSESSMENT — PAIN SCALES - GENERAL: PAINLEVEL: NO PAIN (0)

## 2018-10-01 NOTE — PROGRESS NOTES
2018             Hamzah Atkins MD    [address] [address]      RE:    Xavi Mtz   MRN:  [id number]   :  1990      Dear Dr. Atkins:      I had the pleasure of seeing your 20-year-old patient, Xavi, for cardiac followup on 10/1/2018.  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  He had a carbon Brantley 33 mm prosthetic tricuspid valve placed in  by Micheal Mazariegos at the [facility].        Over the past few years, there has been a gradual increase in his tricuspid valve gradient from 6 to 15 mmHg.  He had some exercise intolerance also.  He underwent cardiac catheterization and balloon angioplasty of his tricuspid valve in 2017.  Post procedure, his tricuspid valve gradient was 8 mmHg.  He is here today for followup of that procedure.        I last saw him in 10/2017.  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  On the bright side, the reason that he changed from a job as a  to a cynthia was for an increase in pay.  This job does not involve any heavy lifting and he has no trouble doing it.  He does have a 4-year-old son.  He does not do any regular physical activity.  It is therefore very hard to evaluate his exercise tolerance.  He has not fainted.  In addition to the tricuspid valve problem, he has had paroxysmal tachycardia for a few years.  For that, he takes diltiazem 360 mg sustained release daily.  He tells me if he takes his medication, he has very few episodes of tachycardia (about once every 6 months), but without medicine, he has a fast heartbeat nearly every day.  He is actually happy with how things are going in that department.      PHYSICAL EXAMINATION:  A healthy-appearing, acyanotic young man.  Blood pressure is 106/72, and the heart rate is 89.  Weight 89.7 kg and height is 176 cm.  Neck is supple without adenopathy.  Mucous membranes are pink.  Chest  is clear.  Cardiac exam shows a quiet precordium with a normal first and widely split second heart sound.  There is no murmur.  Liver is not enlarged and peripheral pulses are present.  There is no peripheral edema.  Neurologic exam is grossly intact.  There is no clubbing or cyanosis.      Xavi had an electrocardiogram today which showed incomplete right bundle branch block as we see in patients with Ebstein.  His echocardiogram showed tricuspid valve gradient of 7-8, which is stable.      IMPRESSION:  Xavi seems to be doing well from a cardiac point of view.  He is also taking a baby aspirin 81 mg daily.  I tried to encourage him to do some regular physical activity.  I also had some recommendations for a reading program for his 4-year-old.        We spent more than 25 minutes in direct face-to-face discussion with regarding treatment options for his tachycardia and a prosthetic valve problems.  We agreed to review things in 6 months with an echo and EKG.  They will call with any questions or concerns.  I did refill his diltiazem for 1 year.      I appreciate the opportunity to participate in Xavi's care.  Please let me know if I can provide any other information for you.      Sincerely,         Breanna Sanchez MD

## 2018-10-01 NOTE — MR AVS SNAPSHOT
After Visit Summary   10/1/2018    Xavi Mtz    MRN: [id number]           Patient Information     Date Of Birth          1990        Visit Information        Provider Department      10/1/2018 10:00 AM Breanna Sanchez MD [facility]        Today's Diagnoses     Ebstein's anomaly        Tachycardia          Care Instructions    [facility]  Pediatric Specialty Clinic [place]      Pediatric Call Center [phone], option 1  Sobia Vega RN Care Coordinator:  [phone]    After Hours Emergency:  [phone].  Ask for the on-call pediatric doctor for the specialty you are calling for be paged.    Prescription Renewals:  Your pharmacy must fax requests to [phone].  Please allow 2-3 days for prescriptions to be authorized.    If your physician has ordered an CT or MRI, you may schedule this test by calling [facility] in [place] at [phone].            Follow-ups after your visit        Your next 10 appointments already scheduled     Apr 15, 2019  9:00 AM CDT   Echo [place] [facility] Only with [facility] PEDS CARD ECHO ROOM   [facility] ([facility])    [address]  [address]  [facility] [address] [phone]            Apr 15, 2019 10:00 AM CDT   Return Visit with Breanna Sanchez MD   [facility] ([facility])    [address]  [address]  [facility] [address] [phone]              Who to contact     Please call your clinic at [phone] to:    Ask questions about your health    Make or cancel appointments    Discuss your medicines    Learn about your test results    Speak to your doctor            Additional Information About Your Visit        MyChart Information     [employer] gives you secure access to your electronic health record. If you see a primary care  "provider, you can also send messages to your care team and make appointments. If you have questions, please call your primary care clinic.  If you do not have a primary care provider, please call [phone] and they will assist you.      [employer] is an electronic gateway that provides easy, online access to your medical records. With [employer], you can request a clinic appointment, read your test results, renew a prescription or communicate with your care team.     To access your existing account, please contact your [facility] or call [phone] for assistance.        Care EveryWhere ID     This is your Care EveryWhere ID. This could be used by other organizations to access your [place] medical records  [id number]        Your Vitals Were     Pulse Height BMI (Body Mass Index)             89 5' 9.45\" (176.4 cm) 28.83 kg/m2          Blood Pressure from Last 3 Encounters:   10/01/18 106/72   04/02/18 118/77   10/02/17 125/80    Weight from Last 3 Encounters:   10/01/18 197 lb 12 oz (89.7 kg)   04/02/18 190 lb 11.2 oz (86.5 kg)   10/02/17 196 lb 13.9 oz (89.3 kg)              We Performed the Following     EKG 12-lead complete w/ read - Future          Where to get your medicines      These medications were sent to [id number] [address] [address]    Hours:  Closed 10-16-08 business to [facility] Phone:  [phone]     diltiazem 360 MG 24 hr capsule          Primary Care Provider Office Phone # Fax #    Hamzah Atkins [phone]       [facility] [address] [facility] [address]        Equal Access to Services     ILDA HOROWITZ AH: Maida Hutchinson, allen alvarado, jerrica basurto. Kristie [facility] [phone].    ATENCIÓN: Si habla español, tiene a bonilla disposición servicios gratuitos de asistencia lingüística. Llame al " [phone].    We comply with applicable federal civil rights laws and [place] laws. We do not discriminate on the basis of race, color, national origin, age, disability, sex, sexual orientation, or gender identity.            Thank you!     Thank you for choosing [facility]  for your care. Our goal is always to provide you with excellent care. Hearing back from our patients is one way we can continue to improve our services. Please take a few minutes to complete the written survey that you may receive in the mail after your visit with us. Thank you!             Your Updated Medication List - Protect others around you: Learn how to safely use, store and throw away your medicines at www.disposemymeds.org.          This list is accurate as of 10/1/18 10:25 AM.  Always use your most recent med list.                   Brand Name Dispense Instructions for use Diagnosis    acetaminophen 500 MG tablet    TYLENOL    50 tablet    Take 2 tablets (1,000 mg) by mouth every 6 hours as needed for mild pain or fever    Neck pain       aspirin 81 MG tablet     90 tablet    Take 1 tablet (81 mg) by mouth daily    Prosthetic cardiac valve calcification, subsequent encounter       diltiazem 360 MG 24 hr capsule    TIAZAC    30 capsule    Take 1 capsule (360 mg) by mouth daily    Tachycardia       ibuprofen 200 MG tablet    ADVIL/MOTRIN    100 tablet    Take 4 tablets (800 mg) by mouth every 6 hours as needed for mild pain    Neck pain

## 2018-10-01 NOTE — LETTER
Return to  Work Release    Date: 10/1/2018      Name: Xavi Mtz                       YOB: 1990    Medical Record Number: [id number]    The patient was seen at: [facility]            _________________________  Lucrecia Rico CMA

## 2018-10-01 NOTE — PATIENT INSTRUCTIONS
[facility]  Pediatric Specialty Clinic [place]      Pediatric Call Center [phone], option 1  Sobia Vega RN Care Coordinator:  [phone]    After Hours Emergency:  [phone].  Ask for the on-call pediatric doctor for the specialty you are calling for be paged.    Prescription Renewals:  Your pharmacy must fax requests to [phone].  Please allow 2-3 days for prescriptions to be authorized.    If your physician has ordered an CT or MRI, you may schedule this test by calling [facility] in [place] at [phone].

## 2018-10-01 NOTE — NURSING NOTE
"[facility] [[id number]]  Chief Complaint   Patient presents with     Heart Problem     Follow-up on SVT and Ebstein's Anomaly.     Initial /72 (BP Location: Right arm, Patient Position: Sitting, Cuff Size: Adult Large)  Pulse 89  Ht 5' 9.45\" (176.4 cm)  Wt 197 lb 12 oz (89.7 kg)  BMI 28.83 kg/m2 Estimated body mass index is 28.83 kg/(m^2) as calculated from the following:    Height as of this encounter: 5' 9.45\" (176.4 cm).    Weight as of this encounter: 197 lb 12 oz (89.7 kg).  Medication Reconciliation: complete    "

## 2018-10-01 NOTE — LETTER
10/1/2018      RE: Xavi Mtz  [address] [facility] [address]       2018             Hamzah Atkins MD    [address] [address]      RE:    Xavi Mtz   MRN:  [id number]   :  1990      Dear Dr. Atkins:      I had the pleasure of seeing your 20-year-old patient, Xavi, for cardiac followup on 10/1/2018.  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  He had a carbon Brantley 33 mm prosthetic tricuspid valve placed in  by Micheal Mazariegos at the [facility].        Over the past few years, there has been a gradual increase in his tricuspid valve gradient from 6 to 15 mmHg.  He had some exercise intolerance also.  He underwent cardiac catheterization and balloon angioplasty of his tricuspid valve in 2017.  Post procedure, his tricuspid valve gradient was 8 mmHg.  He is here today for followup of that procedure.        I last saw him in 10/2017.  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  On the bright side, the reason that he changed from a job as a  to a cynthia was for an increase in pay.  This job does not involve any heavy lifting and he has no trouble doing it.  He does have a 4-year-old son.  He does not do any regular physical activity.  It is therefore very hard to evaluate his exercise tolerance.  He has not fainted.  In addition to the tricuspid valve problem, he has had paroxysmal tachycardia for a few years.  For that, he takes diltiazem 360 mg sustained release daily.  He tells me if he takes his medication, he has very few episodes of tachycardia (about once every 6 months), but without medicine, he has a fast heartbeat nearly every day.  He is actually happy with how things are going in that department.      PHYSICAL EXAMINATION:  A healthy-appearing, acyanotic young man.  Blood pressure is 106/72, and the heart rate is 89.  Weight 89.7 kg and  height is 176 cm.  Neck is supple without adenopathy.  Mucous membranes are pink.  Chest is clear.  Cardiac exam shows a quiet precordium with a normal first and widely split second heart sound.  There is no murmur.  Liver is not enlarged and peripheral pulses are present.  There is no peripheral edema.  Neurologic exam is grossly intact.  There is no clubbing or cyanosis.      Xavi had an electrocardiogram today which showed incomplete right bundle branch block as we see in patients with Ebstein.  His echocardiogram showed tricuspid valve gradient of 7-8, which is stable.      IMPRESSION:  Xavi seems to be doing well from a cardiac point of view.  He is also taking a baby aspirin 81 mg daily.  I tried to encourage him to do some regular physical activity.  I also had some recommendations for a reading program for his 4-year-old.        We spent more than 25 minutes in direct face-to-face discussion with regarding treatment options for his tachycardia and a prosthetic valve problems.  We agreed to review things in 6 months with an echo and EKG.  They will call with any questions or concerns.  I did refill his diltiazem for 1 year.      I appreciate the opportunity to participate in Xavi's care.  Please let me know if I can provide any other information for you.      Sincerely,         Breanna Sanchez MD

## 2018-11-28 DIAGNOSIS — I47.10 SVT (SUPRAVENTRICULAR TACHYCARDIA) (H): ICD-10-CM

## 2018-11-28 DIAGNOSIS — Q22.5 EBSTEIN'S ANOMALY: Primary | ICD-10-CM

## 2019-02-15 DIAGNOSIS — T82.897D: ICD-10-CM

## 2019-02-15 DIAGNOSIS — R00.0 TACHYCARDIA: ICD-10-CM

## 2019-02-15 RX ORDER — DILTIAZEM HYDROCHLORIDE 360 MG/1
360 CAPSULE, EXTENDED RELEASE ORAL DAILY
Qty: 30 CAPSULE | Refills: 11 | Status: SHIPPED | OUTPATIENT
Start: 2019-02-15 | End: 2020-01-09

## 2019-02-15 NOTE — TELEPHONE ENCOUNTER
Xavi's local pharmacy closed.  He called to request that his prescriptions be transferred to [facility].      This was done. Xavi verbalized understanding and will call back with any other questions or concerns.    Sobia Vega RN Care Coordinator  [facility]

## 2019-02-18 ENCOUNTER — HOSPITAL ENCOUNTER (EMERGENCY)
Facility: CLINIC | Age: 29
Discharge: HOME | End: 2019-02-18
Attending: PHYSICIAN ASSISTANT | Admitting: PHYSICIAN ASSISTANT
Payer: OTHER MISCELLANEOUS

## 2019-02-18 ENCOUNTER — APPOINTMENT (OUTPATIENT)
Dept: GENERAL RADIOLOGY | Facility: CLINIC | Age: 29
End: 2019-02-18
Attending: PHYSICIAN ASSISTANT
Payer: OTHER MISCELLANEOUS

## 2019-02-18 VITALS
WEIGHT: 200 LBS | HEIGHT: 70 IN | BODY MASS INDEX: 28.63 KG/M2 | HEART RATE: 78 BPM | DIASTOLIC BLOOD PRESSURE: 83 MMHG | SYSTOLIC BLOOD PRESSURE: 131 MMHG | OXYGEN SATURATION: 95 % | RESPIRATION RATE: 16 BRPM | TEMPERATURE: 98.3 F

## 2019-02-18 DIAGNOSIS — M54.50 ACUTE BILATERAL LOW BACK PAIN WITHOUT SCIATICA: ICD-10-CM

## 2019-02-18 PROCEDURE — 99214 OFFICE O/P EST MOD 30 MIN: CPT | Mod: Z6 | Performed by: PHYSICIAN ASSISTANT

## 2019-02-18 PROCEDURE — 72100 X-RAY EXAM L-S SPINE 2/3 VWS: CPT

## 2019-02-18 PROCEDURE — G0463 HOSPITAL OUTPT CLINIC VISIT: HCPCS | Performed by: PHYSICIAN ASSISTANT

## 2019-02-18 PROCEDURE — 72072 X-RAY EXAM THORAC SPINE 3VWS: CPT

## 2019-02-18 RX ORDER — CYCLOBENZAPRINE HCL 10 MG
10 TABLET ORAL 3 TIMES DAILY PRN
Qty: 15 TABLET | Refills: 0 | Status: SHIPPED | OUTPATIENT
Start: 2019-02-18 | End: 2019-04-22

## 2019-02-18 ASSESSMENT — MIFFLIN-ST. JEOR: SCORE: 1883.44

## 2019-02-18 NOTE — ED AVS SNAPSHOT
[facility] Emergency Department  [address] [address]  Phone:  [phone]  Fax:  [phone]                                    Xavi Mtz   MRN: [id number]    Department:  [facility] Emergency Department   Date of Visit:  2/18/2019           After Visit Summary Signature Page    I have received my discharge instructions, and my questions have been answered. I have discussed any challenges I see with this plan with the nurse or doctor.    ..........................................................................................................................................  Patient/Patient Representative Signature      ..........................................................................................................................................  Patient Representative Print Name and Relationship to Patient    ..................................................               ................................................  Date                                   Time    ..........................................................................................................................................  Reviewed by Signature/Title    ...................................................              ..............................................  Date                                               Time          22EPIC Rev 08/18

## 2019-02-18 NOTE — ED PROVIDER NOTES
History     Chief Complaint   Patient presents with     Back Injury     Pt injured his back while at work today - states was pulling plastic and felt incredible pain to low back      HPI  Xavi Mtz is a 28 year old male who presents to the urgent care with concern over back pain after injury at work 1 hour prior to arrival.  Patient states he was pulling a plastic tube attempting to cover a hole was when he felt sudden onset of pain in his central and lower mid back.  He describes pain as aching at rest however does become sharp with certain movements.  He denies any other associated symptoms.  No recent fevers, chills, myalgias, chest pain, dyspnea, wheezing, nausea, vomiting, diarrhea, abdominal pain, lower extremity or saddle numbness or paresthesias or urinary complaints.  He has not attempted any OTC treatments.  He denies any prior history of significant back pain previously.    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     Priority: Medium     S/p tricuspid valve replacement 1999       SVT (supraventricular tachycardia) (H) 03/24/2015     Priority: Medium      Past Medical History:    Past Medical History:   Diagnosis Date     Congenital heart disease      Past Surgical History:    Past Surgical History:   Procedure Laterality Date     CARDIAC SURGERY       HEART CATH CHILD N/A 7/27/2017    Procedure: HEART CATH CHILD;  Right and Left Heart Cath;  Surgeon: Marisa Jessica MD;  Location: [facility] OR     Family History:    Family History   Problem Relation Age of Onset     Diabetes Mother      Social History:  Marital Status:   [2]  Social History     Tobacco Use     Smoking status: Former Smoker     Packs/day: 0.50     Years: 10.00     Pack years: 5.00      "Types: Cigarettes     Smokeless tobacco: Current User     Types: Chew     Tobacco comment:  Smokes E-Cigs   Substance Use Topics     Alcohol use: No     Alcohol/week: 0.0 oz     Drug use: No        Medications:      acetaminophen (TYLENOL) 500 MG tablet   aspirin (ASA) 81 MG tablet   diltiazem ER (TIAZAC) 360 MG 24 hr ER beaded capsule   ibuprofen (ADVIL/MOTRIN) 200 MG tablet     Review of Systems   Constitutional: Negative for chills and fever.   Eyes: Negative for photophobia, discharge, redness and itching.   Respiratory: Negative for cough, shortness of breath and wheezing.    Cardiovascular: Negative for chest pain and palpitations.   Gastrointestinal: Negative for abdominal pain, diarrhea, nausea and vomiting.   Musculoskeletal: Positive for back pain. Negative for neck pain.   Skin: Negative for color change, rash and wound.   Neurological: Negative for dizziness, seizures, weakness, numbness and headaches.     Physical Exam   BP: 131/83  Pulse: 78  Temp: 98.3  F (36.8  C)  Resp: 16  Height: 177.8 cm (5' 10\")  Weight: 90.7 kg (200 lb)  SpO2: 95 %  Physical Exam   Constitutional: He is oriented to person, place, and time. He appears well-developed and well-nourished. No distress.   Cardiovascular: Normal rate, regular rhythm and normal heart sounds. Exam reveals no friction rub.   No murmur heard.  Pulmonary/Chest: Effort normal and breath sounds normal. No stridor. No respiratory distress. He has no wheezes. He has no rales.   Musculoskeletal:        Thoracic back: He exhibits tenderness and pain. He exhibits no bony tenderness, no deformity, no laceration and normal pulse.        Lumbar back: He exhibits decreased range of motion, tenderness and pain. He exhibits no swelling, no edema, no deformity, no laceration and normal pulse.   Neurological: He is alert and oriented to person, place, and time.   Reflex Scores:       Patellar reflexes are 2+ on the right side and 2+ on the left side.  Skin: Skin is warm " and dry. Capillary refill takes less than 2 seconds. No abrasion, no ecchymosis and no rash noted. No erythema.     ED Course        Procedures        Critical Care time:  none        Results for orders placed or performed during the hospital encounter of 02/18/19   Thoracic spine XR, 3 views    Narrative    XR THORACIC SPINE 3 VW,   XR LUMBAR SPINE 2-3 VIEWS 2/18/2019 3:21 PM    HISTORY: Pain after injury at work, pulling on something.    COMPARISON: None.    FINDINGS: 3 views of the thoracic spine and 3 views of the lumbar  spine. Alignment is anatomic. Vertebral body heights and disc spaces  are preserved. No acute osseous abnormality.      Impression    IMPRESSION: No acute osseous abnormality.    SERG ARDON MD   Lumbar spine XR, 2-3 views    Narrative    XR THORACIC SPINE 3 VW,   XR LUMBAR SPINE 2-3 VIEWS 2/18/2019 3:21 PM    HISTORY: Pain after injury at work, pulling on something.    COMPARISON: None.    FINDINGS: 3 views of the thoracic spine and 3 views of the lumbar  spine. Alignment is anatomic. Vertebral body heights and disc spaces  are preserved. No acute osseous abnormality.      Impression    IMPRESSION: No acute osseous abnormality.    SERG ARDON MD     Medications - No data to display    Assessments & Plan (with Medical Decision Making)     I have reviewed the nursing notes.    I have reviewed the findings, diagnosis, plan and need for follow up with the patient.          Medication List      Started    cyclobenzaprine 10 MG tablet  Commonly known as:  FLEXERIL  10 mg, Oral, 3 TIMES DAILY PRN          Final diagnoses:   Acute bilateral low back pain without sciatica     28-year-old male presents to urgent care with concern over back pain after he pulled forcefully on an object earlier today at work.  He had stable vital signs upon arrival.  Physical exam findings as described above.  As part of evaluation he did have x-ray of his lumbar and thoracic spine which were negative for acute bony  abnormality.  Symptoms most consistent with muscle strain, spasm.  Differential would also include DDD, spinal stenosis, herniated disc, no evidence of spinal fracture, spondylolisthesis.  Patient did not have any worrisome symptoms symptoms to suggest cauda equina, epidural abscess, other emergent need for MRI.  He was discharged home stable with instructions for symptomatic treatment with rest, ice, ibuprofen/Tylenol.  Prescription for Flexeril to be used as needed for spasm.  Work restrictions place for the next several days.  Follow-up with primary care provider if no improvement within the next week.  Worrisome reasons to return to the ER/UC sooner discussed.      Disclaimer: This note consists of symbols derived from keyboarding, dictation, and/or voice recognition software. As a result, there may be errors in the script that have gone undetected.  Please consider this when interpreting information found in the chart.    2/18/2019   [facility] EMERGENCY DEPARTMENT     Shonda Younger PA-C  02/21/19 2013

## 2019-02-21 ASSESSMENT — ENCOUNTER SYMPTOMS
NAUSEA: 0
BACK PAIN: 1
WHEEZING: 0
WEAKNESS: 0
SHORTNESS OF BREATH: 0
EYE DISCHARGE: 0
WOUND: 0
PALPITATIONS: 0
NUMBNESS: 0
EYE ITCHING: 0
COLOR CHANGE: 0
DIZZINESS: 0
SEIZURES: 0
CHILLS: 0
ABDOMINAL PAIN: 0
VOMITING: 0
NECK PAIN: 0
PHOTOPHOBIA: 0
EYE REDNESS: 0
HEADACHES: 0
DIARRHEA: 0
FEVER: 0
COUGH: 0

## 2019-04-22 ENCOUNTER — OFFICE VISIT (OUTPATIENT)
Dept: PEDIATRIC CARDIOLOGY | Facility: CLINIC | Age: 29
End: 2019-04-22
Payer: COMMERCIAL

## 2019-04-22 ENCOUNTER — ANCILLARY PROCEDURE (OUTPATIENT)
Dept: CARDIOLOGY | Facility: CLINIC | Age: 29
End: 2019-04-22
Payer: COMMERCIAL

## 2019-04-22 VITALS
HEART RATE: 86 BPM | SYSTOLIC BLOOD PRESSURE: 109 MMHG | WEIGHT: 199.52 LBS | DIASTOLIC BLOOD PRESSURE: 78 MMHG | RESPIRATION RATE: 18 BRPM | BODY MASS INDEX: 29.55 KG/M2 | HEIGHT: 69 IN

## 2019-04-22 DIAGNOSIS — I47.10 SVT (SUPRAVENTRICULAR TACHYCARDIA) (H): ICD-10-CM

## 2019-04-22 DIAGNOSIS — Q22.5 EBSTEIN'S ANOMALY: Primary | ICD-10-CM

## 2019-04-22 DIAGNOSIS — Q22.5 EBSTEIN'S ANOMALY: ICD-10-CM

## 2019-04-22 LAB — INTERPRETATION ECG - MUSE: NORMAL

## 2019-04-22 ASSESSMENT — MIFFLIN-ST. JEOR: SCORE: 1872.5

## 2019-04-22 ASSESSMENT — PAIN SCALES - GENERAL: PAINLEVEL: NO PAIN (0)

## 2019-04-22 NOTE — PATIENT INSTRUCTIONS
[facility]  Pediatric Specialty Clinic [place]      Pediatric Call Center [phone], option 1  Sobia Vega RN Care Coordinator:  [phone]    After Hours Needing Immediate Care:  [phone].  Ask for the on-call pediatric doctor for the specialty you are calling for be paged.    Prescription Renewals:  Please call your pharmacy first.  Your pharmacy must fax requests to [phone].  Please allow 2-3 days for prescriptions to be authorized.    If your physician has ordered a CT or MRI, you may schedule this test by calling [facility]iology in [place] at [phone].    **If your child is having a sedated procedure, they will need a history and physical done at their Primary Care Provider within 30 days of the procedure.  If your child was seen by the ordering provider in our office within 30 days of the procedure, their visit summary will work for the H&P unless they inform you otherwise.  If you have any questions, please call the RN Care Coordinator.**

## 2019-04-22 NOTE — LETTER
2019      RE: Xavi Mtz  [address] [facility] [address]       2019             Hamzah Atkins MD   [address] [address]      RE: Xavi Mtz    MRN: [id number]   : 1990      Dear Dr. Atkins:       I had the pleasure of seeing your 28-year-old patient Xavi for cardiac followup on 2019.  He is a young man I have known for 28 years.  He has Ebstein anomaly of the tricuspid valve.  He had a carbon Brantley 33 mm prosthetic tricuspid valve placed in  by Dr. Micheal Mazariegos at the [facility].  I last saw him in 10/2018.  Over the past 2 years, he had a gradual increase in his tricuspid valve gradient from 6-15 mmHg.  He also had some exercise intolerance.  He underwent cardiac catheterization and balloon angioplasty of his tricuspid valve in 2017.  Post procedure, his tricuspid valve gradient was 8 mmHg.  He is here today for followup.  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  He has a new girlfriend.  He has a 4-year-old son.  He is presently in the process of getting  from his present wife, which would be a good thing for him.  He has shared custody with his ex-wife for this 4-year-old son.  He does not do any regular physical activity.  He has not had any trouble working.  He has not fainted.  Over the past 6 weeks, he has had some episodes of being lightheaded while standing at work.  He needs to sit down for about 10 minutes.  He thinks it happens a couple times a week, but that his heart rate is in the 80s when this happens.  He continues to have occasional episodes of fast heartbeat, but does not feel that that is limiting him in any way.  He takes diltiazem 360 mg daily.  He also takes a baby aspirin daily.  Aside from these new episodes of dizziness, he is happy with how things are going.      PHYSICAL EXAMINATION:  Exam shows an alert, oriented,  acyanotic young man.  Blood pressure is 109/78, and the heart rate is 86.  Weight is 90.5 kg, and height is 176 cm.  Neck is supple without adenopathy.  Mucous membranes are pink.  Chest is clear.  Cardiac exam shows a quiet precordium with a normal first and widely split second heart sound.  I do not hear a murmur.  Liver is not enlarged, and peripheral pulses are present.  There is no peripheral edema.  Neurologic exam is grossly intact.  There is no clubbing or cyanosis.      LABORATORY STUDIES:  Xavi had an electrocardiogram today, which showed an incomplete right bundle branch block as we see in patients with Ebstein.  He also had atrial extrasystoles.  His echocardiogram showed a tricuspid valve mean gradient of 10 mmHg, which is increased by 2 from his previous study.      IMPRESSION:  Xavi seems to be doing well from a cardiac point of view.  I do not think we are ready to recommend re-intervention with his tricuspid valve.  Instead, we will see how things go over the next 6 months.  He will contact us if he has more symptoms.  I encouraged him to drink more water and less Mountain Dew.  He should continue his diltiazem.  We will see him back in 6 months with an echo and EKG.  He had a chance to have his questions answered.      I appreciate the opportunity to participate in Xavi's care.      Sincerely,      Breanna Sanchez MD

## 2019-04-22 NOTE — LETTER
Return to  Work Release    Date: 4/22/2019      Name: Xavi Mtz                       YOB: 1990    Medical Record Number: [id number]    The patient was seen at: [facility]            _________________________  Lucrecia Rico CMA

## 2019-04-22 NOTE — PROGRESS NOTES
2019             Hamzah Atkins MD   [address] [address]      RE: Xavi Mtz    MRN: [id number]   : 1990      Dear Dr. Atkins:       I had the pleasure of seeing your 28-year-old patient Xavi for cardiac followup on 2019.  He is a young man I have known for 28 years.  He has Ebstein anomaly of the tricuspid valve.  He had a carbon Brantley 33 mm prosthetic tricuspid valve placed in  by Dr. Micheal Mazariegos at the [facility].  I last saw him in 10/2018.  Over the past 2 years, he had a gradual increase in his tricuspid valve gradient from 6-15 mmHg.  He also had some exercise intolerance.  He underwent cardiac catheterization and balloon angioplasty of his tricuspid valve in 2017.  Post procedure, his tricuspid valve gradient was 8 mmHg.  He is here today for followup.  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  He has a new girlfriend.  He has a 4-year-old son.  He is presently in the process of getting  from his present wife, which would be a good thing for him.  He has shared custody with his ex-wife for this 4-year-old son.  He does not do any regular physical activity.  He has not had any trouble working.  He has not fainted.  Over the past 6 weeks, he has had some episodes of being lightheaded while standing at work.  He needs to sit down for about 10 minutes.  He thinks it happens a couple times a week, but that his heart rate is in the 80s when this happens.  He continues to have occasional episodes of fast heartbeat, but does not feel that that is limiting him in any way.  He takes diltiazem 360 mg daily.  He also takes a baby aspirin daily.  Aside from these new episodes of dizziness, he is happy with how things are going.      PHYSICAL EXAMINATION:  Exam shows an alert, oriented, acyanotic young man.  Blood pressure is 109/78, and the heart rate is 86.  Weight is 90.5  kg, and height is 176 cm.  Neck is supple without adenopathy.  Mucous membranes are pink.  Chest is clear.  Cardiac exam shows a quiet precordium with a normal first and widely split second heart sound.  I do not hear a murmur.  Liver is not enlarged, and peripheral pulses are present.  There is no peripheral edema.  Neurologic exam is grossly intact.  There is no clubbing or cyanosis.      LABORATORY STUDIES:  Xavi had an electrocardiogram today, which showed an incomplete right bundle branch block as we see in patients with Ebstein.  He also had atrial extrasystoles.  His echocardiogram showed a tricuspid valve mean gradient of 10 mmHg, which is increased by 2 from his previous study.      IMPRESSION:  Xavi seems to be doing well from a cardiac point of view.  I do not think we are ready to recommend re-intervention with his tricuspid valve.  Instead, we will see how things go over the next 6 months.  He will contact us if he has more symptoms.  I encouraged him to drink more water and less Mountain Dew.  He should continue his diltiazem.  We will see him back in 6 months with an echo and EKG.  He had a chance to have his questions answered.      I appreciate the opportunity to participate in Xavi's care.      Sincerely,      Breanna Sanchez MD

## 2019-04-22 NOTE — NURSING NOTE
"[facility] [[id number]]  Chief Complaint   Patient presents with     Heart Problem     Follow-up on Ebstein Anomaly and SVT.     Initial /78 (BP Location: Right arm, Patient Position: Sitting, Cuff Size: Adult Large)   Pulse 86   Ht 1.764 m (5' 9.45\")   Wt 90.5 kg (199 lb 8.3 oz)   BMI 29.08 kg/m   Estimated body mass index is 29.08 kg/m  as calculated from the following:    Height as of this encounter: 1.764 m (5' 9.45\").    Weight as of this encounter: 90.5 kg (199 lb 8.3 oz).  Medication Reconciliation: complete    "

## 2019-06-10 ENCOUNTER — TELEPHONE (OUTPATIENT)
Dept: PEDIATRIC CARDIOLOGY | Facility: CLINIC | Age: 29
End: 2019-06-10

## 2019-06-10 NOTE — TELEPHONE ENCOUNTER
Xavi called to let Dr. Sanchez know that he accidentally took two of his diltiazem ER 360mg capsules this AM.  He took one when he got up, went back to bed a bit and took it again after his shower.  He wanted to know if there is anything to watch for.  He said the only new symptom is he feels like he has an air bubble in his chest.  Not really chest tightening though.  He said he has some dizziness/SOB but that is not unusual for him baseline.    After speaking to Dr. Sanchez, called Xavi back.  Let him know that Dr. Sanchez thinks he is going to be ok, but he should drink plently of fluids today.  It could lower his blood pressure and heart rate so that would help to keep plenty hydrated.  She also advised to make good decisions for the next 24 hours since they are extended release capsules.  If he is dizzy, he should refrain from going up on high ladders, driving etc.  Anything that may put him in danger.  He should not take his diltiazem tomorrow morning.  He can re-start the following day.    Xavi verbalized understanding and will call back with any questions or concerns.    Sobia Vega, RN Care Coordinator  [facility]

## 2019-06-27 DIAGNOSIS — T82.897D: Primary | ICD-10-CM

## 2019-06-27 NOTE — TELEPHONE ENCOUNTER
Xavi called to check in.  He is scheduled for surgery on his teeth in the near future and he wanted to know if he needed to use SBE prophylaxis.      Let Xavi know that per [facility] protocol, he does need SBE prophylaxis one hour prior to his dental procedures.    Sent in a refill for that per nursing protocol.      Xavi verbalized understanding and will call back with any questions or concerns.    Sobia Vega RN Care Coordinator  [place] [facility]

## 2019-06-28 RX ORDER — AMOXICILLIN 500 MG/1
2000 CAPSULE ORAL
Qty: 4 CAPSULE | Refills: 3 | Status: SHIPPED | OUTPATIENT
Start: 2019-06-28 | End: 2023-04-30

## 2019-09-05 ENCOUNTER — TRANSFERRED RECORDS (OUTPATIENT)
Dept: HEALTH INFORMATION MANAGEMENT | Facility: CLINIC | Age: 29
End: 2019-09-05

## 2019-09-05 DIAGNOSIS — I47.10 SVT (SUPRAVENTRICULAR TACHYCARDIA) (H): Primary | ICD-10-CM

## 2019-09-05 DIAGNOSIS — Q22.5 EBSTEIN'S ANOMALY: ICD-10-CM

## 2019-09-06 ENCOUNTER — ANCILLARY PROCEDURE (OUTPATIENT)
Dept: CARDIOLOGY | Facility: CLINIC | Age: 29
End: 2019-09-06
Payer: COMMERCIAL

## 2019-09-06 DIAGNOSIS — Q22.5 EBSTEIN'S ANOMALY: ICD-10-CM

## 2019-09-06 DIAGNOSIS — I47.10 SVT (SUPRAVENTRICULAR TACHYCARDIA) (H): ICD-10-CM

## 2019-09-16 ENCOUNTER — OFFICE VISIT (OUTPATIENT)
Dept: PEDIATRIC CARDIOLOGY | Facility: CLINIC | Age: 29
End: 2019-09-16
Payer: COMMERCIAL

## 2019-09-16 ENCOUNTER — ANCILLARY PROCEDURE (OUTPATIENT)
Dept: CARDIOLOGY | Facility: CLINIC | Age: 29
End: 2019-09-16
Payer: COMMERCIAL

## 2019-09-16 VITALS
WEIGHT: 194.89 LBS | SYSTOLIC BLOOD PRESSURE: 115 MMHG | HEIGHT: 69 IN | BODY MASS INDEX: 28.87 KG/M2 | DIASTOLIC BLOOD PRESSURE: 79 MMHG

## 2019-09-16 DIAGNOSIS — I47.10 SVT (SUPRAVENTRICULAR TACHYCARDIA) (H): ICD-10-CM

## 2019-09-16 DIAGNOSIS — Q22.5 EBSTEIN'S ANOMALY: ICD-10-CM

## 2019-09-16 DIAGNOSIS — Q22.5 EBSTEIN'S ANOMALY: Primary | ICD-10-CM

## 2019-09-16 ASSESSMENT — PAIN SCALES - GENERAL: PAINLEVEL: NO PAIN (0)

## 2019-09-16 ASSESSMENT — MIFFLIN-ST. JEOR: SCORE: 1846.5

## 2019-09-16 NOTE — PROGRESS NOTES
ZIO PATCH SETUP    [facility]  [facility]  [address] [address] [phone]    DATE/TIME :  September 16, 2019    PRODUCT CODE / ID: [id number]  _______________________________________________    7 day karlos placed on Xavi.

## 2019-09-16 NOTE — PATIENT INSTRUCTIONS
[facility]  Pediatric Specialty Clinic [place]      Pediatric Call Center [phone], option 1  Sobia Vega RN Care Coordinator:  [phone]    After Hours Needing Immediate Care:  [phone].  Ask for the on-call pediatric doctor for the specialty you are calling for be paged.  For dermatology urgent matters that cannot wait until the next business day, is over a holiday and/or a weekend please call [phone] and ask for the Dermatology Resident On-Call to be paged.    Prescription Renewals:  Please call your pharmacy first.  Your pharmacy must fax requests to [phone].  Please allow 2-3 days for prescriptions to be authorized.    If your physician has ordered a CT or MRI, you may schedule this test by calling [facility] in [place] at [phone].    **If your child is having a sedated procedure, they will need a history and physical done at their Primary Care Provider within 30 days of the procedure.  If your child was seen by the ordering provider in our office within 30 days of the procedure, their visit summary will work for the H&P unless they inform you otherwise.  If you have any questions, please call the RN Care Coordinator.**

## 2019-09-16 NOTE — PATIENT INSTRUCTIONS
[facility]  Pediatric Specialty Clinic [place]      Pediatric Call Center [phone], option 1  Sobia Vega RN Care Coordinator:  [phone]    After Hours Needing Immediate Care:  [phone].  Ask for the on-call pediatric doctor for the specialty you are calling for be paged.  For dermatology urgent matters that cannot wait until the next business day, is over a holiday and/or a weekend please call [phone] and ask for the Dermatology Resident On-Call to be paged.    Prescription Renewals:  Please call your pharmacy first.  Your pharmacy must fax requests to [phone].  Please allow 2-3 days for prescriptions to be authorized.    If your physician has ordered a CT or MRI, you may schedule this test by calling [facility] in [place] at [phone].    **If your child is having a sedated procedure, they will need a history and physical done at their Primary Care Provider within 30 days of the procedure.  If your child was seen by the ordering provider in our office within 30 days of the procedure, their visit summary will work for the H&P unless they inform you otherwise.  If you have any questions, please call the RN Care Coordinator.**

## 2019-09-16 NOTE — NURSING NOTE
[id number]  Chief Complaint   Patient presents with     Heart Problem     SVT, Ebstein anomaly, Echo results      Initial /79 (BP Location: Right arm, Patient Position: Sitting, Cuff Size: Adult Large)   Ht 1.764 m (5' 9.45\")   Wt 88.4 kg (194 lb 14.2 oz)   BMI 28.41 kg/m   Estimated body mass index is 28.41 kg/m  as calculated from the following:    Height as of this encounter: 1.764 m (5' 9.45\").    Weight as of this encounter: 88.4 kg (194 lb 14.2 oz).  Medication Reconciliation: complete    "

## 2019-09-16 NOTE — LETTER
"  2019      RE: Xavi Mtz  [address] [facility] [address]       2019             Hamzah Atkins MD   [address] [address]      RE: Xavi Mtz    MRN: [id number]   : 1990      Dear Dr. Atkins:       I had the pleasure of seeing your 29-year-old patient Xavi for cardiac followup on 2019.  This visit was prompted by his recent onset of symptoms of \"not feeling well.\"  He thinks about a month ago he was at work standing and doing something that he ordinarily could do without difficulty when he developed tunnel vision and lightheadedness.  People he was working with told him they could not find his pulse, and eventually he lay down until he felt better.  He thinks that he has had this happen on nearly every day at work, generally if he was lifting something.  He has also had it happen when he is walking and occasionally while sitting.  He checks his pulse and oxygen level using an satish on his phone.  It is clear he spent quite a bit of time thinking about this over the past few weeks.  Two weeks ago, he had what sounds like a respiratory infection that did not help things.  Xavi knows when he has paroxysmal tachycardia, and these symptoms are not related to that.  He takes aspirin and diltiazem.  He has not had any weight loss.  His appetite is okay.  His mom is thinking he might have diabetes, and I see that a blood glucose was not checked on his recent evaluation.  He had a Holter monitor, which showed no paroxysmal tachycardia, and a recent echo continued to show a 10 mm tricuspid valve gradient, which represented no change from his last study.      PHYSICAL EXAMINATION:  Exam shows an alert, oriented young man.  Blood pressure is 115/79, and his heart rate is 80.  Weight is 88.4 kg.  Neck is supple without adenopathy.  Mucous membranes are pink.  Chest is clear.  Cardiac exam shows a quiet precordium " with a normal first and physiologically split second heart sound.  There is no murmur.  Liver is not enlarged, and peripheral pulses are present.  There is no peripheral edema.  Neurologic exam is grossly intact.  There is no clubbing or cyanosis.      LABORATORY STUDIES:  I did not do any other testing today.      IMPRESSION:  I think that Xavi has what sounds like some hypervagal episodes.  I am not certain if they are related to decreased cardiac output or to his intercurrent illness.  For sure, he is spending a lot of time thinking about them.  Because he did not have any symptoms during his Holter monitor, I did arrange for him to have a Zio patch so that we could have ECG documentation during symptoms.  Also, to check on his cardiac output with activity, we will do a cardiopulmonary exercise test in the near future.  For now, I urged him to optimize the parts of his life that he had control over (getting enough sleep, adequate hydration, minimizing stress).  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  He and his mom had a chance to have their questions answered.      I spent more than 25 minutes in direct face-to-face discussion with Xavi and his mother regarding his potential diagnoses and options.      I appreciate the opportunity to participate in Xavi's care.      Sincerely,      Breanna Sanchez MD

## 2019-09-16 NOTE — PROGRESS NOTES
"2019             Hamzah Atkins MD   [address] [address]      RE: Xavi Mtz    MRN: [id number]   : 1990      Dear Dr. Atkins:       I had the pleasure of seeing your 29-year-old patient Xavi for cardiac followup on 2019.  This visit was prompted by his recent onset of symptoms of \"not feeling well.\"  He thinks about a month ago he was at work standing and doing something that he ordinarily could do without difficulty when he developed tunnel vision and lightheadedness.  People he was working with told him they could not find his pulse, and eventually he lay down until he felt better.  He thinks that he has had this happen on nearly every day at work, generally if he was lifting something.  He has also had it happen when he is walking and occasionally while sitting.  He checks his pulse and oxygen level using an satish on his phone.  It is clear he spent quite a bit of time thinking about this over the past few weeks.  Two weeks ago, he had what sounds like a respiratory infection that did not help things.  Xavi knows when he has paroxysmal tachycardia, and these symptoms are not related to that.  He takes aspirin and diltiazem.  He has not had any weight loss.  His appetite is okay.  His mom is thinking he might have diabetes, and I see that a blood glucose was not checked on his recent evaluation.  He had a Holter monitor, which showed no paroxysmal tachycardia, and a recent echo continued to show a 10 mm tricuspid valve gradient, which represented no change from his last study.      PHYSICAL EXAMINATION:  Exam shows an alert, oriented young man.  Blood pressure is 115/79, and his heart rate is 80.  Weight is 88.4 kg.  Neck is supple without adenopathy.  Mucous membranes are pink.  Chest is clear.  Cardiac exam shows a quiet precordium with a normal first and physiologically split second heart sound.  There is no murmur.  " Liver is not enlarged, and peripheral pulses are present.  There is no peripheral edema.  Neurologic exam is grossly intact.  There is no clubbing or cyanosis.      LABORATORY STUDIES:  I did not do any other testing today.      IMPRESSION:  I think that Xavi has what sounds like some hypervagal episodes.  I am not certain if they are related to decreased cardiac output or to his intercurrent illness.  For sure, he is spending a lot of time thinking about them.  Because he did not have any symptoms during his Holter monitor, I did arrange for him to have a Zio patch so that we could have ECG documentation during symptoms.  Also, to check on his cardiac output with activity, we will do a cardiopulmonary exercise test in the near future.  For now, I urged him to optimize the parts of his life that he had control over (getting enough sleep, adequate hydration, minimizing stress).  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  He and his mom had a chance to have their questions answered.      I spent more than 25 minutes in direct face-to-face discussion with Xavi and his mother regarding his potential diagnoses and options.      I appreciate the opportunity to participate in Xavi's care.      Sincerely,      Breanna Sanchez MD

## 2019-09-16 NOTE — LETTER
Return to  Work Release    Date: 9/16/2019      Name: Xavi Mtz                       YOB: 1990    Medical Record Number: [id number]    The patient was seen at: [facility]    Please take this into consideration when reviewing patients time away from work      _________________________  Krysten Oconnor CMA

## 2019-09-17 DIAGNOSIS — R68.89 EXERCISE INTOLERANCE: Primary | ICD-10-CM

## 2019-10-03 ENCOUNTER — HEALTH MAINTENANCE LETTER (OUTPATIENT)
Age: 29
End: 2019-10-03

## 2019-10-09 ENCOUNTER — TELEPHONE (OUTPATIENT)
Dept: PEDIATRIC CARDIOLOGY | Facility: CLINIC | Age: 29
End: 2019-10-09

## 2019-10-09 ENCOUNTER — HOSPITAL ENCOUNTER (OUTPATIENT)
Dept: CARDIOLOGY | Facility: CLINIC | Age: 29
Discharge: HOME | End: 2019-10-09
Attending: PEDIATRICS | Admitting: PEDIATRICS
Payer: COMMERCIAL

## 2019-10-09 DIAGNOSIS — R68.89 EXERCISE INTOLERANCE: ICD-10-CM

## 2019-10-09 LAB
EXPTIME-PRE: 6.46 SEC
FEF2575-%PRED-PRE: 117 %
FEF2575-PRE: 5.25 L/SEC
FEF2575-PRED: 4.48 L/SEC
FEFMAX-%PRED-PRE: 119 %
FEFMAX-PRE: 11.95 L/SEC
FEFMAX-PRED: 9.96 L/SEC
FEV1-%PRED-PRE: 102 %
FEV1-PRE: 4.46 L
FEV1FEV6-PRE: 86 %
FEV1FEV6-PRED: 83 %
FEV1FVC-PRE: 86 %
FEV1FVC-PRED: 83 %
FIFMAX-PRE: 6.32 L/SEC
FVC-%PRED-PRE: 98 %
FVC-PRE: 5.17 L
FVC-PRED: 5.23 L

## 2019-10-09 PROCEDURE — 94621 CARDIOPULM EXERCISE TESTING: CPT

## 2019-10-09 NOTE — TELEPHONE ENCOUNTER
Xavi called and left a message on the nurse line checking to see if his zio patch results are in.  Called him back and let him know that his zio patched showed when he was having symptoms he was in sinus rhythm, average heart rate of 100.  Xavi is scheduled for a stress test this morning with Dr. Sanchez as well.      Xavi verbalized understanding and will call back with any questions or concerns.    Sobia Vega RN Care Coordinator  [facility]

## 2019-10-10 DIAGNOSIS — Q22.5 EBSTEIN'S ANOMALY: Primary | ICD-10-CM

## 2019-10-17 NOTE — PROGRESS NOTES
[facility] Disposition Conference Note    Patient:  Xavi Mtz MRN:  [id number]   Surgeon: Dr. Kulkarni : 1990   Primary Card: Dr. Sanchez Age:  29 year old   Date of Discussion:  10/21/19 PCP:  Hamzah Atkins MD     HPI: Xavi is a 29 year old male with Ebstein anomaly of the tricuspid valve. He underwent a 33 mm prosthetic tricuspid valve placement in  at [facility] followed by balloon valvuloplasty in 2017. He has developed an increasing tricuspid valve gradient over the past 2 years from 6-15 mmHg along with decreased exercise tolerance. He is being presented for discussion regarding possible transcatheter valve placement in the prosthetic valve.    Cardiac Diagnoses:  1. Ebstein Anomaly of the tricuspid valve  1. Bioprosthetic valve stenosis  2. Bioprosthetic valve regurgitation  2. Depressed RV function  3. Right ventricular dilation  4. Right atrial dilation  5. ASD  6. Paroxysmal supraventricular tachycardia    Previous Cardiac Surgeries:  1. (3/25/1999) - Excision of tricuspid valve (anterior leaflet), 33 mm Hoda-Brantley bioprosthesis valve placement in tricuspid valve position, right reduction atrioplasty, autologous pericardial patch closure of the ASD - Dr. Mazariegos - [facility]    Previous Catheterizations:  1. (17) - Low CI at 1.92 L/min/m2, TV area decreased 0.64 cm2, indexed valve area 0.31cm2/m2, mean gradient across TV 11 mmHg despite low CO. Decreased SBP to mid 60s, but improvement with dopamine and albumin infusion.     Balloon valvuloplasty with Tyshak II 25 mm x 5 cm balloon and Z Med II 25 mm x 4 cm balloons.    CI improved to 3.51 L/min/m2, TV area significantly improved 1.8 cm2 (indexed valve area 0.87 cm2/m2), mean gradient improved to 5.3 mmHg. SBPs were maintained in 90s after discontinuation of Dopamine.       Non-cardiac PMHx:   1. Tobacco Use     Medications:     Current Outpatient Medications:      aspirin (ASA) 81 MG tablet daily      diltiazem ER (TIAZAC) 360 MG daily     Allergies:    Allergies   Allergen Reactions     Ceclor [Cefaclor]      Mouth ulcers       Weight 88.4 kg BSA: 2.08 m2   Height 176.4 cm      Most recent exam vitals: BP: 114/79  HR: 84  RR: 32  SpO2: 100%    Pertinent physical exam findings:  Chest is clear.  Cardiac exam shows a quiet precordium with a normal first and physiologically split second heart sound.  There is no murmur.  Liver is not enlarged, and peripheral pulses are present. There is no peripheral edema.  Neurologic exam is grossly intact.  There is no clubbing or cyanosis.     Imaging/Studies:  1. Stress Test (10/9/19) -    The patient exercised for 8 minutes and 20 seconds.    The patient exercised into Stage 3 of the Standard Lv Protocol, achieving a heart rate of 151 bpm in sinus rhythm.   Blood pressure ish appropriately for each stage of exercise.  Heart rate was suboptimal with peak rate of 151 at peak exercise (79% predicted).  Baseline heart rate was 75 bpm and ish to 151 bpm with baseline blood pressure of 112/68 mmHg and ish to a max blood pressure of 154/76 mmHg at the end of stage 2. At the end of stage 3, blood pressure was 150/70 mmHg and dropped to 92/40 47 seconds into recovery  There were no arrhythmias.  There were no ST-T changes.  Exercise was stopped due to lightheadedness.  The patient recovered uneventfully from exercise.  VO2 was 28.7 ml/kg/min (predicted 39.5)  RER 1.02  VO2/heart rate was 17 mm/beat, predicted 18, but stroke volume curve is blunted suggesting limited cardiac output.  VE/VCO2 slope was 45 (predicted 29)    2. Ziopatch (9/29/19) - Primary rhythm is sinus, heart rate  bpm.  Avg heart rate of 78 bpm. Symptoms of dizziness and shortness of breath occur during sinus rhythm at around 100 bpm. Occasional atrial and ventricular extrasystoles occur. No paroxysmal arrhythmias or AV block occurred. One 4 beat run of ventricular rhythm occurred and did not correlate  with symptoms.    3. Echo (9/6/19) - Patient with history of Ebstein's Anomaly, after 33 mm tricuspid valve replacement. Percutaneous 25mm balloon valvuloplasty of the tricuspid valve (7/27/17). Tricuspid valve mean gradient 10 mmHg. Trivial tricuspid valve insufficiency. The ventricular septum is dyskenetic and bows towards the left ventricle in early diastole. There is moderate right ventricular enlargement with low normal systolic function.     4. Cath (7/27/17) - Low CI at 1.92 L/min/m2, TV area decreased 0.64 cm2, indexed valve area 0.31cm2/m2, mean gradient across TV 11 mmHg despite low CO. Decreased SBP to mid 60s, but improvement with dopamine and albumin infusion.     Balloon valvuloplasty with Tyshak II 25 mm x 5 cm balloon and Z Med II 25 mm x 4 cm balloons.    CI improved to 3.51 L/min/m2, TV area significantly improved 1.8 cm2 (indexed valve area 0.87 cm2/m2), mean gradient improved to 5.3 mmHg. SBPs were maintained in 90s after discontinuation of Dopamine.     5. Chest CT w/o contrast (5/2/17) -   1. Tricuspid valve prosthesis with hyperattenuating leaflets,  suggestive of mineralization/calcification deposition. Cardiac valves  are otherwise normal.   2. Multiple sub-3 mm pulmonary nodules. No follow-up is required  unless there are risk factors.     Pertinent Labs: Electrolytes 7/27/17: wnl, Hgb/Hct 7/27/17: 13.3/ 37.1    Current access/access issues: None    Anesthesia Issues: None    Discussion (10/21/19): Dr. Kulkarni will discuss with family and Dr. Sanchez.  Options = transcatheter TV   Jemal.  One stage or two stage.  JS            Prepared By: HS 10/21/19      Present for discussion:     Cardiology  CV Surgery  Radiology   X Dr. Brad Li X Dr. Massimo Griselli Dr. Eric Hoggard Dr. Matthew Ambrose X Dr. Bernabe Schumacher   X Dr. Rita Munoz       X Dr. Aba Bautista  Critical Care  Anesthesia   X Dr. Desiree Caruso   X   Daniel Cortez Dr. Caroline George X Dr. Mojca Konia X Dr. Gurumurthy Hiremath Dr. Sameer Gupta Dr. Martina Richtsfeld Dr. Edward Kaplan Dr. Janet Hume Dr. Elena Zupfer X Dr. Stacie Knutson Dr. Brian Joy X Dr. Jamie Lohr X Dr. Sheila White  Neonatology    Dr. Mateusz Bentley   X Dr. Elena Cifuentes X Pascual Elise X Osito Mixon       ECG 4/22/19 PACs, RBBB, Prolonged SC:           Catheterization 7/27/17:

## 2019-10-21 ENCOUNTER — DOCUMENTATION ONLY (OUTPATIENT)
Dept: CARDIOLOGY | Facility: CLINIC | Age: 29
End: 2019-10-21

## 2019-10-28 ENCOUNTER — TRANSFERRED RECORDS (OUTPATIENT)
Dept: HEALTH INFORMATION MANAGEMENT | Facility: CLINIC | Age: 29
End: 2019-10-28

## 2019-10-29 ENCOUNTER — TRANSFERRED RECORDS (OUTPATIENT)
Dept: HEALTH INFORMATION MANAGEMENT | Facility: CLINIC | Age: 29
End: 2019-10-29

## 2019-11-06 ENCOUNTER — TRANSFERRED RECORDS (OUTPATIENT)
Dept: HEALTH INFORMATION MANAGEMENT | Facility: CLINIC | Age: 29
End: 2019-11-06

## 2019-12-06 ENCOUNTER — TRANSFERRED RECORDS (OUTPATIENT)
Dept: HEALTH INFORMATION MANAGEMENT | Facility: CLINIC | Age: 29
End: 2019-12-06

## 2019-12-07 ENCOUNTER — TRANSFERRED RECORDS (OUTPATIENT)
Dept: HEALTH INFORMATION MANAGEMENT | Facility: CLINIC | Age: 29
End: 2019-12-07

## 2019-12-16 ENCOUNTER — TRANSFERRED RECORDS (OUTPATIENT)
Dept: HEALTH INFORMATION MANAGEMENT | Facility: CLINIC | Age: 29
End: 2019-12-16

## 2020-01-09 ENCOUNTER — HOSPITAL ENCOUNTER (EMERGENCY)
Facility: CLINIC | Age: 30
Discharge: HOME | End: 2020-01-09
Attending: FAMILY MEDICINE | Admitting: FAMILY MEDICINE
Payer: COMMERCIAL

## 2020-01-09 VITALS
SYSTOLIC BLOOD PRESSURE: 131 MMHG | WEIGHT: 200 LBS | HEIGHT: 70 IN | HEART RATE: 90 BPM | DIASTOLIC BLOOD PRESSURE: 81 MMHG | OXYGEN SATURATION: 97 % | BODY MASS INDEX: 28.63 KG/M2

## 2020-01-09 DIAGNOSIS — I47.10 SVT (SUPRAVENTRICULAR TACHYCARDIA) (H): ICD-10-CM

## 2020-01-09 DIAGNOSIS — Q22.5 EBSTEIN'S ANOMALY: ICD-10-CM

## 2020-01-09 DIAGNOSIS — R04.0 EPISTAXIS: ICD-10-CM

## 2020-01-09 DIAGNOSIS — Z98.890 STATUS POST TRICUSPID VALVE REPAIR: ICD-10-CM

## 2020-01-09 LAB
BASOPHILS # BLD AUTO: 0.1 10E9/L (ref 0–0.2)
BASOPHILS NFR BLD AUTO: 1.1 %
DIFFERENTIAL METHOD BLD: NORMAL
EOSINOPHIL # BLD AUTO: 0.1 10E9/L (ref 0–0.7)
EOSINOPHIL NFR BLD AUTO: 1.4 %
ERYTHROCYTE [DISTWIDTH] IN BLOOD BY AUTOMATED COUNT: 12.1 % (ref 10–15)
HCT VFR BLD AUTO: 44.7 % (ref 40–53)
HGB BLD-MCNC: 15.7 G/DL (ref 13.3–17.7)
IMM GRANULOCYTES # BLD: 0 10E9/L (ref 0–0.4)
IMM GRANULOCYTES NFR BLD: 0.2 %
INR PPP: 3.28 (ref 0.86–1.14)
LYMPHOCYTES # BLD AUTO: 2.2 10E9/L (ref 0.8–5.3)
LYMPHOCYTES NFR BLD AUTO: 32.8 %
MCH RBC QN AUTO: 31 PG (ref 26.5–33)
MCHC RBC AUTO-ENTMCNC: 35.1 G/DL (ref 31.5–36.5)
MCV RBC AUTO: 88 FL (ref 78–100)
MONOCYTES # BLD AUTO: 0.5 10E9/L (ref 0–1.3)
MONOCYTES NFR BLD AUTO: 7.2 %
NEUTROPHILS # BLD AUTO: 3.8 10E9/L (ref 1.6–8.3)
NEUTROPHILS NFR BLD AUTO: 57.3 %
NRBC # BLD AUTO: 0 10*3/UL
NRBC BLD AUTO-RTO: 0 /100
PLATELET # BLD AUTO: 245 10E9/L (ref 150–450)
RBC # BLD AUTO: 5.06 10E12/L (ref 4.4–5.9)
WBC # BLD AUTO: 6.6 10E9/L (ref 4–11)

## 2020-01-09 PROCEDURE — 99284 EMERGENCY DEPT VISIT MOD MDM: CPT | Mod: Z6 | Performed by: FAMILY MEDICINE

## 2020-01-09 PROCEDURE — 85610 PROTHROMBIN TIME: CPT | Performed by: FAMILY MEDICINE

## 2020-01-09 PROCEDURE — 85025 COMPLETE CBC W/AUTO DIFF WBC: CPT | Performed by: FAMILY MEDICINE

## 2020-01-09 PROCEDURE — 99283 EMERGENCY DEPT VISIT LOW MDM: CPT | Performed by: FAMILY MEDICINE

## 2020-01-09 RX ORDER — WARFARIN SODIUM 5 MG/1
TABLET ORAL
COMMUNITY
Start: 2020-01-07 | End: 2022-11-25 | Stop reason: ALTCHOICE

## 2020-01-09 RX ORDER — DILTIAZEM HYDROCHLORIDE 180 MG/1
180 CAPSULE, COATED, EXTENDED RELEASE ORAL
COMMUNITY
Start: 2019-12-03 | End: 2020-05-04

## 2020-01-09 RX ORDER — OXYMETAZOLINE HYDROCHLORIDE 0.05 G/100ML
2 SPRAY NASAL 2 TIMES DAILY
Status: DISCONTINUED | OUTPATIENT
Start: 2020-01-09 | End: 2020-01-09 | Stop reason: HOSPADM

## 2020-01-09 RX ORDER — TRANEXAMIC ACID 100 MG/ML
500 INJECTION, SOLUTION INTRAVENOUS ONCE
Status: DISCONTINUED | OUTPATIENT
Start: 2020-01-09 | End: 2020-01-09 | Stop reason: HOSPADM

## 2020-01-09 RX ORDER — LIDOCAINE HYDROCHLORIDE 40 MG/ML
5 INJECTION, SOLUTION RETROBULBAR ONCE
Status: DISCONTINUED | OUTPATIENT
Start: 2020-01-09 | End: 2020-01-09 | Stop reason: HOSPADM

## 2020-01-09 ASSESSMENT — MIFFLIN-ST. JEOR: SCORE: 1878.44

## 2020-01-09 NOTE — DISCHARGE INSTRUCTIONS
ICD-10-CM    1. Epistaxis R04.0 INR     CBC with platelets, differential    you were treated with intranasal lidocaine, txa, thrombin topically, afrin.  thde bleediing has stopped.  return for recurrence as nasal ballon would be needed.  inr and hgb is pending,.  use afrin in the nose twice daily for 3 days.  avoid nose blowing and trauma.  may alsop consider topical vsaseline along the right septum by cotton swab applied twice daily   2. SVT (supraventricular tachycardia) (H) I47.1    3. Ebstein's anomaly Q22.5    4. Status post tricuspid valve repair Z98.890

## 2020-01-09 NOTE — ED PROVIDER NOTES
History     Chief Complaint   Patient presents with     Epistaxis     for past 45 min.  on coumadin     HPI  Xavi Mtz is a 29 year old male who presents with history of Ebstein anomaly and status post tricuspid valve replacement 1999 on chronic anticoagulation with warfarin and a history of SVT on diltiazem who presents with 1 hour of epistaxis.  No trauma.  No preceding symptoms.  No bleeding from other sites.      Allergies:  Allergies   Allergen Reactions     Ceclor [Cefaclor]      Mouth ulcers       Problem List:    Patient Active Problem List    Diagnosis Date Noted     Neck pain following catheterization sheath  07/28/2017     Priority: Medium     Ebstein's anomaly 03/24/2015     Priority: Medium     S/p tricuspid valve replacement 1999       SVT (supraventricular tachycardia) (H) 03/24/2015     Priority: Medium        Past Medical History:    Past Medical History:   Diagnosis Date     Congenital heart disease        Past Surgical History:    Past Surgical History:   Procedure Laterality Date     CARDIAC SURGERY       HEART CATH CHILD N/A 7/27/2017    Procedure: HEART CATH CHILD;  Right and Left Heart Cath;  Surgeon: Marisa Jessica MD;  Location:  OR       Family History:    Family History   Problem Relation Age of Onset     Diabetes Mother        Social History:  Marital Status:   [2]  Social History     Tobacco Use     Smoking status: Current Some Day Smoker     Packs/day: 0.50     Years: 10.00     Pack years: 5.00     Types: Cigarettes     Smokeless tobacco: Current User     Types: Chew     Tobacco comment:  Smokes E-Cigs   Substance Use Topics     Alcohol use: No     Alcohol/week: 0.0 standard drinks     Drug use: No        Medications:    acetaminophen (TYLENOL) 500 MG tablet  amoxicillin (AMOXIL) 500 MG capsule  aspirin (ASA) 81 MG tablet  diltiazem ER (TIAZAC) 360 MG 24 hr ER beaded capsule  ibuprofen (ADVIL/MOTRIN) 200 MG tablet          Review of  "Systems    ROS:  5 point ROS negative except as noted above in HPI, including Gen., Resp., CV, GI &  system review.  He denies any hemoptysis, hematuria, blood in the stool.       Physical Exam   BP: 131/81  Pulse: 90  Height: 177.8 cm (5' 10\")  Weight: 90.7 kg (200 lb)  SpO2: 97 %      Physical Exam  Constitutional:       General: He is not in acute distress.     Appearance: He is not diaphoretic.   HENT:      Mouth/Throat:      Mouth: Mucous membranes are moist.   Neck:      Musculoskeletal: Neck supple.   Neurological:      Mental Status: He is alert.       The nose is without trauma.  Left nare has initial bleeding from what appears to be the Kaseelbach plexus anterior region of the septum.  No obvious focal lesion.      ED Course        [facility]    -Epistaxis Mgmt  Date/Time: 1/9/2020 7:30 PM  Performed by: Wil Blackman MD  Authorized by: Wil Blackman MD       ANESTHESIA (see MAR for exact dosages)     Anesthesia method:  Topical application    Topical anesthesia: lidocaine 4%      PROCEDURE DETAILS     Treatment site:  L anterior    Treatment method:  Thrombin (TXA, afrin, thrombin)    Treatment complexity:  Limited    Treatment episode: no recurrence of recent bleed        POST PROCEDURE     Assessment:  Bleeding stopped  PROCEDURE   Patient Tolerance:  Patient tolerated the procedure well with no immediate complications                     Critical Care time:  none               Results for orders placed or performed during the hospital encounter of 01/09/20 (from the past 24 hour(s))   INR   Result Value Ref Range    INR 3.28 (H) 0.86 - 1.14   CBC with platelets, differential   Result Value Ref Range    WBC 6.6 4.0 - 11.0 10e9/L    RBC Count 5.06 4.4 - 5.9 10e12/L    Hemoglobin 15.7 13.3 - 17.7 g/dL    Hematocrit 44.7 40.0 - 53.0 %    MCV 88 78 - 100 fl    MCH 31.0 26.5 - 33.0 pg    MCHC 35.1 31.5 - 36.5 g/dL    RDW 12.1 10.0 - 15.0 %    Platelet Count 245 150 - 450 " 10e9/L    Diff Method Automated Method     % Neutrophils 57.3 %    % Lymphocytes 32.8 %    % Monocytes 7.2 %    % Eosinophils 1.4 %    % Basophils 1.1 %    % Immature Granulocytes 0.2 %    Nucleated RBCs 0 0 /100    Absolute Neutrophil 3.8 1.6 - 8.3 10e9/L    Absolute Lymphocytes 2.2 0.8 - 5.3 10e9/L    Absolute Monocytes 0.5 0.0 - 1.3 10e9/L    Absolute Eosinophils 0.1 0.0 - 0.7 10e9/L    Absolute Basophils 0.1 0.0 - 0.2 10e9/L    Abs Immature Granulocytes 0.0 0 - 0.4 10e9/L    Absolute Nucleated RBC 0.0        Medications   tranexamic acid (CYKLOKAPRON) spray 500 mg (has no administration in time range)   oxymetazoline (AFRIN) 0.05 % spray 2 spray (has no administration in time range)   thrombin 5000 units EPISTAXIS kit (has no administration in time range)   lidocaine 4 % injection 5 mL (has no administration in time range)       Assessments & Plan (with Medical Decision Making)       MDM: Xavi Mtz is a 29 year old male who presented with epistaxis and a history of warfarin use for tricuspid valve replacement and history of Elmer's anomaly.  No obvious nasal trauma.  Anterior left nostril likely the source.  Clamp applied on presentation and when available instilled 4% lidocaine, followed by intranasal Afrin, topical thrombin and TXA.  Clamp was then left in place and after approximately 20 minutes I reexamined the patient.  There is no active bleeding.  Small amount of coagulated blood in the anterior nare but no significant clot.  No obvious significant source.      His INR is elevated to 3.4 and I did discuss with him contacting his warfarin clinic for adjustments.  We discussed that the bleeding may recur and to avoid any nasal trauma or anything in the nose.  If it does recur not controlled with the clamp that I would recommend placement of a Rhino Rocket.  Precautions are given for return.  I have reviewed the nursing notes.    I have reviewed the findings, diagnosis, plan and need for follow up  with the patient.       New Prescriptions    No medications on file       Final diagnoses:   Epistaxis - you were treated with intranasal lidocaine, txa, thrombin topically, afrin.  thde bleediing has stopped.  return for recurrence as nasal ballon would be needed.  inr and hgb is pending,.  use afrin in the nose twice daily for 3 days.  avoid nose blowing and trauma.  may alsop consider topical vsaseline along the right septum by cotton swab applied twice daily   SVT (supraventricular tachycardia) (H)   Ebstein's anomaly   Status post tricuspid valve repair       1/9/2020   [facility] EMERGENCY DEPARTMENT     Wil Blackman MD  01/09/20 1934

## 2020-01-09 NOTE — ED AVS SNAPSHOT
[facility] Emergency Department  [address] [address]  Phone:  [phone]  Fax:  [phone]                                    Xavi Mtz   MRN: [id number]    Department:  [facility] Emergency Department   Date of Visit:  1/9/2020           After Visit Summary Signature Page    I have received my discharge instructions, and my questions have been answered. I have discussed any challenges I see with this plan with the nurse or doctor.    ..........................................................................................................................................  Patient/Patient Representative Signature      ..........................................................................................................................................  Patient Representative Print Name and Relationship to Patient    ..................................................               ................................................  Date                                   Time    ..........................................................................................................................................  Reviewed by Signature/Title    ...................................................              ..............................................  Date                                               Time          22EPIC Rev 08/18

## 2020-01-20 ENCOUNTER — OFFICE VISIT (OUTPATIENT)
Dept: PEDIATRIC CARDIOLOGY | Facility: CLINIC | Age: 30
End: 2020-01-20
Payer: COMMERCIAL

## 2020-01-20 ENCOUNTER — ANCILLARY PROCEDURE (OUTPATIENT)
Dept: CARDIOLOGY | Facility: CLINIC | Age: 30
End: 2020-01-20
Payer: COMMERCIAL

## 2020-01-20 VITALS
WEIGHT: 193.56 LBS | DIASTOLIC BLOOD PRESSURE: 68 MMHG | HEART RATE: 81 BPM | SYSTOLIC BLOOD PRESSURE: 102 MMHG | HEIGHT: 69 IN | RESPIRATION RATE: 20 BRPM | BODY MASS INDEX: 28.67 KG/M2

## 2020-01-20 DIAGNOSIS — I47.10 SVT (SUPRAVENTRICULAR TACHYCARDIA) (H): ICD-10-CM

## 2020-01-20 DIAGNOSIS — Q22.5 EBSTEIN'S ANOMALY: ICD-10-CM

## 2020-01-20 LAB — INTERPRETATION ECG - MUSE: NORMAL

## 2020-01-20 ASSESSMENT — PAIN SCALES - GENERAL: PAINLEVEL: NO PAIN (0)

## 2020-01-20 ASSESSMENT — MIFFLIN-ST. JEOR: SCORE: 1840.5

## 2020-01-20 NOTE — PROGRESS NOTES
2020             Hamzah Atkins MD   [address] [address]      RE: Xavi Mtz    MRN: [id number]   : 1990      Dear Dr. Atkins:       I had the pleasure of seeing your 29-year-old patient Xavi for cardiac and rhythm followup on 2020.  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  Two years ago he had dilation of that valve, and in 2019, he had placement of a valve-in-valve 29 mm Sloan 3 Ultra valve.  Post procedure, he had a 2 mm mean gradient.  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  Of note is that we have never seen atrial flutter in Xavi.        Since his procedures in [place], he tells me he continues to have dizzy spells with bending over or lifting; therefore, those symptoms persist.  He has also had a few nosebleeds related to the warfarin he is taking.  He tells me that he thinks he only has to take it for 3 months after his procedure, so he is about retirement finished with it.  He has had a few episodes of sustained tachycardia since he was in [place].  They did continue him on diltiazem, but at a dose of 180 mg daily.  I do not have access to the complete Electrophysiology report.  Xavi is having some problems with his girlfriend, which always is a situation that bothers him a lot.  Still, it is unclear how much if any benefit he has achieved from the procedures he had in December.      PHYSICAL EXAMINATION:  Exam shows an alert, oriented, acyanotic and talkative young man.  Blood pressure is 102/68, and heart rate is 81.  Weight is 87.8 kg, and height is 176 cm.  Neck is supple without adenopathy.  Mucous membranes are pink.  Chest is clear.  Cardiac exam shows a quiet precordium  with a normal first and physiologically split second heart sound.  There is no murmur.  Liver is not palpable.  Peripheral pulses are present.  There is no peripheral edema.  Neurologic exam is grossly intact.  There is no clubbing or cyanosis.      LABORATORY STUDIES:  Xavi had an electrocardiogram today, which showed sinus rhythm with right bundle branch block.  He had an echo, which showed reduced right ventricular systolic function as it has shown in the past.  He had a 6 mm mean gradient across his new valve, which compares to a gradient of 10 prior to his procedure.  There is retrograde flow in the hepatic veins during atrial systole.      IMPRESSION:  It is hard to see that Xavi has changed very much since his procedures in [place].  Perhaps we should give him a few more months to recover from that.  I think it would be reasonable for us to repeat his cardiopulmonary exercise test so that we know what his baseline is while his tricuspid valve function is improved.  He is agreeable.  We will plan to see him back to do that test in April-May.  He and his mom had a chance to have their questions answered.  For now, I will plan to see him back in 6 months in this clinic with an echo and EKG.  We will do the treadmill at the [facility] on a nonclinic day.      I appreciate the opportunity to participate in Xavi's care.  Please let me know if I can provide any other information for you.      Sincerely,      Breanna Sanchez MD

## 2020-01-20 NOTE — LETTER
Return to  School Release    Date: 1/20/2020      Name: Xavi Mtz                       YOB: 1990    Medical Record Number: [id number]    The patient was seen at: [facility]          _________________________  Lucrecia Rico CMA

## 2020-01-20 NOTE — PATIENT INSTRUCTIONS
[facility] [place]      Pediatric Call Center Scheduling and Nurse Questions:  [phone]  Sobia Vega RN Care Coordinator    After Hours Needing Immediate Care:  [phone].  Ask for the on-call pediatric doctor for the specialty you are calling for be paged.  For dermatology urgent matters that cannot wait until the next business day, is over a holiday and/or a weekend please call [phone] and ask for the Dermatology Resident On-Call to be paged.    Prescription Renewals:  Please call your pharmacy first.  Your pharmacy must fax requests to [phone].  Please allow 2-3 days for prescriptions to be authorized.    If your physician has ordered a CT or MRI, you may schedule this test by calling [facility] in [place] at [phone].    **If your child is having a sedated procedure, they will need a history and physical done at their Primary Care Provider within 30 days of the procedure.  If your child was seen by the ordering provider in our office within 30 days of the procedure, their visit summary will work for the H&P unless they inform you otherwise.  If you have any questions, please call the RN Care Coordinator.**

## 2020-01-20 NOTE — LETTER
2020      RE: Xavi Mtz  [address] [facility] [address]       2020             Hamzah Atkins MD   [address] [address]      RE: Xavi Mtz    MRN: [id number]   : 1990      Dear Dr. Atkins:       I had the pleasure of seeing your 29-year-old patient Xavi for cardiac and rhythm followup on 2020.  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  Two years ago he had dilation of that valve, and in 2019, he had placement of a valve-in-valve 29 mm Sloan 3 Ultra valve.  Post procedure, he had a 2 mm mean gradient.  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  Of note is that we have never seen atrial flutter in Xavi.        Since his procedures in [place], he tells me he continues to have dizzy spells with bending over or lifting; therefore, those symptoms persist.  He has also had a few nosebleeds related to the warfarin he is taking.  He tells me that he thinks he only has to take it for 3 months after his procedure, so he is about nursing home finished with it.  He has had a few episodes of sustained tachycardia since he was in [place].  They did continue him on diltiazem, but at a dose of 180 mg daily.  I do not have access to the complete Electrophysiology report.  Xavi is having some problems with his girlfriend, which always is a situation that bothers him a lot.  Still, it is unclear how much if any benefit he has achieved from the procedures he had in December.      PHYSICAL EXAMINATION:  Exam shows an alert, oriented, acyanotic and talkative young man.  Blood pressure is 102/68, and heart rate is 81.  Weight is 87.8 kg, and height is 176 cm.  Neck is supple without  adenopathy.  Mucous membranes are pink.  Chest is clear.  Cardiac exam shows a quiet precordium with a normal first and physiologically split second heart sound.  There is no murmur.  Liver is not palpable.  Peripheral pulses are present.  There is no peripheral edema.  Neurologic exam is grossly intact.  There is no clubbing or cyanosis.      LABORATORY STUDIES:  Xavi had an electrocardiogram today, which showed sinus rhythm with right bundle branch block.  He had an echo, which showed reduced right ventricular systolic function as it has shown in the past.  He had a 6 mm mean gradient across his new valve, which compares to a gradient of 10 prior to his procedure.  There is retrograde flow in the hepatic veins during atrial systole.      IMPRESSION:  It is hard to see that Xavi has changed very much since his procedures in [place].  Perhaps we should give him a few more months to recover from that.  I think it would be reasonable for us to repeat his cardiopulmonary exercise test so that we know what his baseline is while his tricuspid valve function is improved.  He is agreeable.  We will plan to see him back to do that test in April-May.  He and his mom had a chance to have their questions answered.  For now, I will plan to see him back in 6 months in this clinic with an echo and EKG.  We will do the treadmill at the [facility] on a nonclinic day.      I appreciate the opportunity to participate in Xavi's care.  Please let me know if I can provide any other information for you.      Sincerely,      MD Breanna Blanchard MD

## 2020-01-30 ENCOUNTER — HOSPITAL ENCOUNTER (EMERGENCY)
Facility: CLINIC | Age: 30
Discharge: HOME | End: 2020-01-30
Attending: FAMILY MEDICINE | Admitting: FAMILY MEDICINE
Payer: OTHER MISCELLANEOUS

## 2020-01-30 ENCOUNTER — APPOINTMENT (OUTPATIENT)
Dept: GENERAL RADIOLOGY | Facility: CLINIC | Age: 30
End: 2020-01-30
Attending: FAMILY MEDICINE
Payer: OTHER MISCELLANEOUS

## 2020-01-30 VITALS
DIASTOLIC BLOOD PRESSURE: 84 MMHG | SYSTOLIC BLOOD PRESSURE: 121 MMHG | OXYGEN SATURATION: 100 % | TEMPERATURE: 98 F | RESPIRATION RATE: 14 BRPM

## 2020-01-30 DIAGNOSIS — S69.90XA THUMB INJURY, INITIAL ENCOUNTER: ICD-10-CM

## 2020-01-30 PROCEDURE — 99283 EMERGENCY DEPT VISIT LOW MDM: CPT | Performed by: FAMILY MEDICINE

## 2020-01-30 PROCEDURE — 99284 EMERGENCY DEPT VISIT MOD MDM: CPT | Mod: Z6 | Performed by: FAMILY MEDICINE

## 2020-01-30 PROCEDURE — 73140 X-RAY EXAM OF FINGER(S): CPT | Mod: LT

## 2020-01-30 NOTE — ED NOTES
Pt here with pain in the L thumb that shoots down into the wrist. 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

## 2020-01-30 NOTE — DISCHARGE INSTRUCTIONS
Return to the Emergency Room if the following occurs:     Severely worsened pain, or for any concern at anytime.    Or, follow-up with the following provider as we discussed:     Return to your primary doctor or Sports Medicine next week for reevaluation and further restrictions, if needed.    Medications discussed:    Tylenol for pain, as needed.    Work restrictions provided.    If you received pain-relieving or sedating medication during your time in the ER, avoid alcohol, driving automobiles, or working with machinery.  Also, a responsible adult must stay with you.        Call the Nurse Advice Line at [phone] or [phone] for any concern at anytime.

## 2020-01-30 NOTE — ED AVS SNAPSHOT
[facility] Emergency Department  [address] [address]  Phone:  [phone]  Fax:  [phone]                                    Xavi Mtz   MRN: [id number]    Department:  [facility] Emergency Department   Date of Visit:  1/30/2020           After Visit Summary Signature Page    I have received my discharge instructions, and my questions have been answered. I have discussed any challenges I see with this plan with the nurse or doctor.    ..........................................................................................................................................  Patient/Patient Representative Signature      ..........................................................................................................................................  Patient Representative Print Name and Relationship to Patient    ..................................................               ................................................  Date                                   Time    ..........................................................................................................................................  Reviewed by Signature/Title    ...................................................              ..............................................  Date                                               Time          22EPIC Rev 08/18

## 2020-01-30 NOTE — ED PROVIDER NOTES
"  HPI   The patient is a 29-year-old male presenting with new pain involving the base of his left first digit, near the wrist.  This started 1 week ago.  It came on quite suddenly while working.  He uses a \"DA power \" all day every day.  It was while he was using this cynthia that his pain became severe.  Over the course of the ensuing week he experienced recurrent severe pain whenever he use the cynthia.  While at home he continued to have discomfort but it was much less severe.  In the morning when he would return to work the pain would come back.  It is to the point where he cannot use the cynthia because of pain.  He reports mild swelling at the base of the thumb.  He reports tenderness at the site.  No fever.  No skin changes.  No obvious trauma.        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     Priority: Medium     S/p tricuspid valve replacement 1999 with 33 mm CE prosthesis  Valve-in-valve tricuspid implantation with a Sloan 29 mm valve 12.6.2019       SVT (supraventricular tachycardia) (H) 03/24/2015     Priority: Medium      Past Medical History:    Past Medical History:   Diagnosis Date     Congenital heart disease      Past Surgical History:    Past Surgical History:   Procedure Laterality Date     CARDIAC SURGERY       HEART CATH CHILD N/A 7/27/2017    Procedure: HEART CATH CHILD;  Right and Left Heart Cath;  Surgeon: Marisa Jessica MD;  Location: [facility] OR     Family History:    Family History   Problem Relation Age of Onset     Diabetes Mother      Social History:  Marital Status:   [2]  Social History     Tobacco Use     Smoking status: Current Some Day Smoker     Packs/day: 0.50     Years: 10.00     Pack years: 5.00     Types: Cigarettes     " Smokeless tobacco: Current User     Types: Chew     Tobacco comment:  Smokes E-Cigs   Substance Use Topics     Alcohol use: No     Alcohol/week: 0.0 standard drinks     Drug use: No      Medications:    amoxicillin (AMOXIL) 500 MG capsule  aspirin (ASA) 81 MG tablet  diltiazem ER COATED BEADS (CARDIZEM CD/CARTIA XT) 180 MG 24 hr capsule  warfarin ANTICOAGULANT (COUMADIN) 5 MG tablet      Review of Systems   All other systems reviewed and are negative.      PE   BP: 121/84  Heart Rate: 87  Temp: 98  F (36.7  C)  Resp: 14  SpO2: 100 %  Physical Exam  Vitals signs and nursing note reviewed.   Constitutional:       General: He is not in acute distress.     Appearance: He is not diaphoretic.   HENT:      Head: Atraumatic.   Eyes:      General: No scleral icterus.     Pupils: Pupils are equal, round, and reactive to light.   Neck:      Musculoskeletal: Normal range of motion.   Cardiovascular:      Rate and Rhythm: Normal rate.   Pulmonary:      Effort: No respiratory distress.   Musculoskeletal:      Comments: Focal tenderness over the proximal metacarpal carpal joint and along the proximal thenar eminence.  Flexing the thumb against pressure does not elicit radiating pain down the tendon sheath.  He has full range of motion and normal strength.  Slight swelling at the base of the MCP joint.   Skin:     General: Skin is warm.      Findings: No rash.   Neurological:      Mental Status: He is alert and oriented to person, place, and time.   Psychiatric:         Behavior: Behavior normal.         ED COURSE and MDM   1059.  The patient likely has a combination of contusion and joint inflammation at the first metacarpal carpal joint.  X-ray pending.  If unremarkable, the patient will be discharged to home with work restrictions not to use the left hand for lifting, pushing, pulling anything greater than 10 pounds and to avoid use of the DA cynthia x 7 days, then close follow up.    LABS  Labs Ordered and Resulted from Time of  ED Arrival Up to the Time of Departure from the ED - No data to display    IMAGING  Images reviewed by me.  Radiology report also reviewed.  Fingers XR, 2-3 views, left   Final Result   IMPRESSION: No acute osseous abnormality.      ELANA ARCE MD (Joe)          Procedures    Medications - No data to display      IMPRESSION       ICD-10-CM    1. Thumb injury, initial encounter S69.90XA             Medication List      There are no discharge medications for this visit.                       Fredi Martínez MD  01/30/20 1128

## 2020-01-30 NOTE — LETTER
[facility] EMERGENCY DEPARTMENT  [address] [address]  [phone]      2020    Xavi Mtz  [address] [facility] [address]  [phone] (home)     : 1990      To Whom it may concern:    Xavi Mtz was seen in our Emergency Department today, 2020 for an injury that was reported to be work related.  The patient would benefit from avoiding lifting, pushing, pulling anything greater than 10 pounds with the left hand and avoiding using the DA Gonzalo with his left hand over the next 7 days.    Sincerely,    Fredi Martínez MD

## 2020-04-17 ENCOUNTER — TELEPHONE (OUTPATIENT)
Dept: PEDIATRIC CARDIOLOGY | Facility: CLINIC | Age: 30
End: 2020-04-17

## 2020-04-17 NOTE — TELEPHONE ENCOUNTER
After discussing with Dr. Sanchez, called Xavi to discuss upcoming stress test.  Let him know that Dr. Sanchez would like to post-pone this test, due to COVID-19 outbreak, to keep him safe during this time.   Xavi was ok with the plan to reschedule.    Also, discussed that she would like to do a video virtual visit with him in the next month or two which he agreed.    I let him know schedulers from both would reach out to schedule those.    Xavi verbalized understanding and will call back with any questions or concerns.    Sobia Vega, RN Care Coordinator  [facility]

## 2020-05-04 ENCOUNTER — VIRTUAL VISIT (OUTPATIENT)
Dept: PEDIATRIC CARDIOLOGY | Facility: CLINIC | Age: 30
End: 2020-05-04
Payer: COMMERCIAL

## 2020-05-04 DIAGNOSIS — I47.10 SVT (SUPRAVENTRICULAR TACHYCARDIA) (H): ICD-10-CM

## 2020-05-04 DIAGNOSIS — Q22.5 EBSTEIN'S ANOMALY: Primary | ICD-10-CM

## 2020-05-04 RX ORDER — DILTIAZEM HYDROCHLORIDE 180 MG/1
180 CAPSULE, COATED, EXTENDED RELEASE ORAL
Qty: 90 CAPSULE | Refills: 2 | Status: SHIPPED | OUTPATIENT
Start: 2020-05-04 | End: 2021-07-12

## 2020-05-04 NOTE — PROGRESS NOTES
"Xavi Mtz is a 29 year old male who is being evaluated via a billable video visit.      The patient has been notified of following:     \"This video visit will be conducted via a call between you and your physician/provider. We have found that certain health care needs can be provided without the need for an in-person physical exam.  This service lets us provide the care you need with a video conversation.  If a prescription is necessary we can send it directly to your pharmacy.  If lab work is needed we can place an order for that and you can then stop by our lab to have the test done at a later time.    Video visits are billed at different rates depending on your insurance coverage.  Please reach out to your insurance provider with any questions.    If during the course of the call the physician/provider feels a video visit is not appropriate, you will not be charged for this service.\"    Patient has given verbal consent for Video visit? Yes    How would you like to obtain your AVS? Jens    Patient would like the video invitation sent by: Send to e-mail at: [email or web address]    Will anyone else be joining your video visit? No            "

## 2020-05-04 NOTE — PROGRESS NOTES
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  He had a tricuspid valve replacement with a 33 mm Ohda-Brantley valve in 1999.  In 2018, he had dilation of that valve, and in 12/2019, he had placement of a valve-in-valve 29 mm Sloan 3 Ultra valve.  Post-procedure, he had a 2 mm mean gradient across the new tricuspid valve.  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  Of note is that we have never seen atrial flutter in Xavi.  For at least 10 years, he has had paroxysms of tachycardia.  He has been fairly well-controlled with diltiazem.  However, he is quite clear that his episodes of tachycardia, as well as his symptoms of lightheadedness with exertion, are unchanged when compared to prior to his new tricuspid valve placement.  At the moment, he is not working.  He has workmen's compensation that is covering him since he has sustained a possible wrist injury which might include a fracture.  It is something that he cannot get evaluated electively at present.      His medications include bacterial endocarditis prophylaxis, 1 baby aspirin daily, diltiazem 180 mg daily.  He discontinued his warfarin in February.  I think the recommendation was that he take it until then, and he was having trouble with nosebleeds anyway.  He has a son whom he shares custody of.  At present, he lives with his siblings in his mother and stepfather's home.  Xavi is aware of the precautions that need to be taken because of the coronavirus.  He has been fishing with his son, but other than that has not left home.      When I saw him in January, he had an electrocardiogram which showed sinus rhythm with right bundle branch block.  He had an echo which showed reduced right ventricular systolic function as it had in the past.  He had a 6 mm mean gradient  across his new valve which compares to a gradient of 10 mmHg prior to his new valve placement.  There is retrograde flow in the hepatic veins during atrial systole, which I think would be supportive of a diagnosis of right ventricular noncompliance.      IMPRESSION:  Xavi seems to be stable.  I am concerned that he does not have adequate heart function to do strenuous physical activity such as always been the case in his work.  We had arranged for him to have a cardiopulmonary exercise test last week, but because of the presence of COVID-19, we have elected to defer that test.  I recommended that we have another virtual visit in 3 months to see how things are going.  One possibility, once it is safe for him, would be for him to go to the [facility] to have an echo and a cardiopulmonary exercise test on the same day.  I could see him for a virtual visit after that to discuss options.  We did agree to transfer his diltiazem prescription to the pharmacy at the [facility] in [place], since his mother would be able to pick the prescription up there for him.  He was agreeable to this.  He had a chance to have his questions answered.      I spoke with Xavi and his mother from 1:17 p.m. to 1:42 p.m. on 2020.  I appreciate the opportunity to participate in Xavi's care.      MD MINDY Blanchard MD             D: 2020   T: 2020   MT: al      Name:     XAVI MORFIN   MRN:      [id number]        Account:      [id number]   :      1990           Service Date: 2020      Document: [id number]

## 2020-05-18 ENCOUNTER — TELEPHONE (OUTPATIENT)
Dept: PEDIATRIC CARDIOLOGY | Facility: CLINIC | Age: 30
End: 2020-05-18

## 2020-05-18 NOTE — TELEPHONE ENCOUNTER
Called Xavi back after discussing with Dr. Sanchez.  Dr. Sanchez recommends that Xavi ask to be furloughed, or FMLA if he is able to not work.  He is at high risk if he was to get COVID-19.  If he has to work for money, then he should do so with strict precautions to stay 6 ft away from others, wear a mask, wash hands frequently etc.    Xavi was also interested in pursuing FMLA at this time.  He will get the required paperwork and get it back to us for Dr. Sanchez to fill out.    Xavi verbalized understanding and will call back with any questions or concerns.    Sobia Vega, RN Care Coordinator  [facility]

## 2020-05-18 NOTE — TELEPHONE ENCOUNTER
----- Message from Pam Velasquez sent at 5/18/2020 12:03 PM CDT -----  Regarding: please call  Callers Name: denise Johnson Phone Number: [phone]  Relationship to Patient: self  Best time of day to call: any  Is it ok to leave a detailed voicemail on this number: yes  Reason for Call: his work wants him to return to work  that had a postive. Covid person. He wants to know if okay or what the doc thinks he should do. Please call

## 2020-07-06 NOTE — TELEPHONE ENCOUNTER
Xavi called again to check in now that his FMLA is about to be done and he is to return to work on Monday, 7/13.      Dr. Sanchez still is recommending that he stay home if able, but also to start thinking of a long term plan. Dr. Sanchez thinks at this time, best to think of a plan for the next year or so, since COVID-19 is likely to be around for awhile.  The numbers are up a bit currently, but she does not think the actual community numbers are up. It is likely going to be worse in 6 weeks and so on going forward.  There is no way to know for sure.    Xavi requested that the FMLA paperwork be filled out for the remainder of his allowed FMLA, which is 4 weeks.  This was done and faxed to donita Drummond at [phone].  Xavi is going to start to plan long term and likely look for a remote job.    Sobia Vega, RN Care Coordinator  [facility]

## 2020-07-06 NOTE — TELEPHONE ENCOUNTER
At the request of Xavi, faxed [facility] paperwork for 8 weeks to BETZY Drummond, attn: Yaneth at [phone] on 5/18/2020.

## 2020-08-03 ENCOUNTER — VIRTUAL VISIT (OUTPATIENT)
Dept: PEDIATRIC CARDIOLOGY | Facility: CLINIC | Age: 30
End: 2020-08-03
Payer: COMMERCIAL

## 2020-08-03 DIAGNOSIS — I47.10 SVT (SUPRAVENTRICULAR TACHYCARDIA) (H): ICD-10-CM

## 2020-08-03 DIAGNOSIS — Q22.5 EBSTEIN'S ANOMALY: Primary | ICD-10-CM

## 2020-08-03 NOTE — PROGRESS NOTES
"Xavi Mtz is a 30 year old male who is being evaluated via a billable video visit.      The patient has been notified of following:     \"This video visit will be conducted via a call between you and your physician/provider. We have found that certain health care needs can be provided without the need for an in-person physical exam.  This service lets us provide the care you need with a video conversation.  If a prescription is necessary we can send it directly to your pharmacy.  If lab work is needed we can place an order for that and you can then stop by our lab to have the test done at a later time.    Video visits are billed at different rates depending on your insurance coverage.  Please reach out to your insurance provider with any questions.    If during the course of the call the physician/provider feels a video visit is not appropriate, you will not be charged for this service.\"    Patient has given verbal consent for Video visit? Yes  How would you like to obtain your AVS? MyChart  If you are dropped from the video visit, the video invite should be resent to: Send to e-mail at: [email or web address]  Will anyone else be joining your video visit? No        "

## 2020-08-03 NOTE — LETTER
"  8/3/2020      RE: Xavi Mtz  [address] [facility] [address]       Xavi Mtz is a 30 year old male who is being evaluated via a billable video visit.      HISTORY OF PRESENT ILLNESS:  Xavi is a 30-year-old with Ebstein anomaly of the tricuspid valve.  He had a tricuspid valve replacement with a 33 mm Hoda-Brantley valve in 1999.  In 2018, he had dilation of that valve and in 12/2019, he had placement of a valve-in-valve 29 mm Sloan 3 Ultra valve.  Post-procedure, he had a 2 mm mean gradient across the new tricuspid valve.  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  Of note is that we have never seen atrial flutter in Xavi.  He does have a long history of paroxysmal tachycardia, which was not seen at this electrophysiology study.  That tachycardia has been fairly well controlled with oral diltiazem.  He is quite clear that his episodes of tachycardia, as well as his symptoms of lightheadedness with exertion, are unchanged when compared to prior to his new tricuspid valve replacement.  I met with him virtually in 05/2020.  Since that time, his symptoms really have not changed.  He has episodes of dizziness with exertion.  He has had a few days in the past month where he just has \"no energy.\"  He does not feel as if there is anything abnormal with his heart rate.  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  Of note, Xavi has not fainted.  He is not doing any regular exercise as he is sheltering at home.  He takes aspirin 81 mg daily, diltiazem 180 mg daily.  He does have a son whom he shares custody of.  He lives with his siblings in his mother and stepfather at their home.      LABORATORY STUDIES:  In " 01/2020, he had an electrocardiogram, which showed sinus rhythm with right bundle branch block.  His echo at that time showed reduced right ventricular systolic function as it had in the past and he had a 6 mm mean gradient across his new valve.  There is retrograde flow in the hepatic veins during atrial systole, which I think would be supportive of the diagnosis of right ventricular noncompliance.      IMPRESSION:  Xavi continues to have symptoms, that I think are caused primarily by right ventricular noncompliance and inability to increase the cardiac output with exercise.  From a diagnostic point of view, I reviewed with him the possibility of repeating his echo and possibly his cardiopulmonary exercise test.  It is now possible to do those again at the [facility], and we could have a virtual visit after that.  We also discussed in detail what I thought surgical options might be (a Jemal shunt type operation or a cardiac transplant), neither of which sounded like a great idea to Xavi today.  Certainly if we did not have to worry about COVID-19, he would have any more opportunities to change his career or even to continue to do the work that he has been doing.  It sounds as if his employer has been quite flexible with him, and is willing to let him continue to not work until the virus situation is settled.  He does have an alternate plan if he loses his health insurance when his FMLA expires.  I think he will just have to apply for medical assistance.  We agreed to talk in 3-4 months.  He had a chance to have his questions answered.  We talked from 3:46 p.m. to 4:36 p.m.         Breanna Sanchez MD

## 2020-08-03 NOTE — PROGRESS NOTES
"HISTORY OF PRESENT ILLNESS:  Xavi is a 30-year-old with Ebstein anomaly of the tricuspid valve.  He had a tricuspid valve replacement with a 33 mm Hoda-Brantley valve in 1999.  In 2018, he had dilation of that valve and in 12/2019, he had placement of a valve-in-valve 29 mm Sloan 3 Ultra valve.  Post-procedure, he had a 2 mm mean gradient across the new tricuspid valve.  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  Of note is that we have never seen atrial flutter in Xavi.  He does have a long history of paroxysmal tachycardia, which was not seen at this electrophysiology study.  That tachycardia has been fairly well controlled with oral diltiazem.  He is quite clear that his episodes of tachycardia, as well as his symptoms of lightheadedness with exertion, are unchanged when compared to prior to his new tricuspid valve replacement.  I met with him virtually in 05/2020.  Since that time, his symptoms really have not changed.  He has episodes of dizziness with exertion.  He has had a few days in the past month where he just has \"no energy.\"  He does not feel as if there is anything abnormal with his heart rate.  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  Of note, Xavi has not fainted.  He is not doing any regular exercise as he is sheltering at home.  He takes aspirin 81 mg daily, diltiazem 180 mg daily.  He does have a son whom he shares custody of.  He lives with his siblings in his mother and stepfather at their home.      LABORATORY STUDIES:  In 01/2020, he had an electrocardiogram, which showed sinus rhythm with right bundle branch block.  His echo at that time showed reduced right ventricular systolic function as it had in " the past and he had a 6 mm mean gradient across his new valve.  There is retrograde flow in the hepatic veins during atrial systole, which I think would be supportive of the diagnosis of right ventricular noncompliance.      IMPRESSION:  Xavi continues to have symptoms, that I think are caused primarily by right ventricular noncompliance and inability to increase the cardiac output with exercise.  From a diagnostic point of view, I reviewed with him the possibility of repeating his echo and possibly his cardiopulmonary exercise test.  It is now possible to do those again at the [facility], and we could have a virtual visit after that.  We also discussed in detail what I thought surgical options might be (a Jemal shunt type operation or a cardiac transplant), neither of which sounded like a great idea to Xavi today.  Certainly if we did not have to worry about COVID-19, he would have any more opportunities to change his career or even to continue to do the work that he has been doing.  It sounds as if his employer has been quite flexible with him, and is willing to let him continue to not work until the virus situation is settled.  He does have an alternate plan if he loses his health insurance when his FMLA expires.  I think he will just have to apply for medical assistance.  We agreed to talk in 3-4 months.  He had a chance to have his questions answered.  We talked from 3:46 p.m. to 4:36 p.m.

## 2020-08-03 NOTE — NURSING NOTE
"[facility] [[id number]]  Chief Complaint   Patient presents with     RECHECK     SVT, Ebsteins anomaly     Initial There were no vitals taken for this visit. Estimated body mass index is 28.22 kg/m  as calculated from the following:    Height as of 1/20/20: 1.764 m (5' 9.45\").    Weight as of 1/20/20: 87.8 kg (193 lb 9 oz).  Medication Reconciliation: complete    "

## 2020-08-06 ENCOUNTER — TELEPHONE (OUTPATIENT)
Dept: PEDIATRIC CARDIOLOGY | Facility: CLINIC | Age: 30
End: 2020-08-06

## 2020-08-06 NOTE — TELEPHONE ENCOUNTER
Is an  Needed: no  If yes, Which Language:    Callers Name: Xavi Johnson Phone Number: [phone]  Relationship to Patient: self  Best time of day to call: any  Is it ok to leave a detailed voicemail on this number: yes  Reason for Call: emailed you paperwork for extending leave for work, would like to speak to you      Diann Escamilla

## 2020-08-06 NOTE — TELEPHONE ENCOUNTER
Xavi requested that the FMLA/disability paperwork be filled out for the remainder of his allowed FMLA, which is 4 weeks.  This was done and faxed to donita Drummond at [phone].     Xavi verbalized understanding and will call back with any questions or concerns.    Sobia Vega, RN Care Coordinator  [facility]

## 2020-11-07 ENCOUNTER — HEALTH MAINTENANCE LETTER (OUTPATIENT)
Age: 30
End: 2020-11-07

## 2021-07-12 ENCOUNTER — TELEPHONE (OUTPATIENT)
Dept: PEDIATRIC CARDIOLOGY | Facility: CLINIC | Age: 31
End: 2021-07-12

## 2021-07-12 DIAGNOSIS — I47.10 SVT (SUPRAVENTRICULAR TACHYCARDIA) (H): ICD-10-CM

## 2021-07-12 DIAGNOSIS — Q22.5 EBSTEIN'S ANOMALY: ICD-10-CM

## 2021-07-12 RX ORDER — DILTIAZEM HYDROCHLORIDE 180 MG/1
180 CAPSULE, COATED, EXTENDED RELEASE ORAL
Qty: 90 CAPSULE | Refills: 0 | Status: SHIPPED | OUTPATIENT
Start: 2021-07-12 | End: 2022-10-12

## 2021-07-12 RX ORDER — DILTIAZEM HYDROCHLORIDE 180 MG/1
180 CAPSULE, COATED, EXTENDED RELEASE ORAL
Qty: 90 CAPSULE | Refills: 0 | Status: CANCELLED | OUTPATIENT
Start: 2021-07-12

## 2021-07-12 NOTE — TELEPHONE ENCOUNTER
Patient last seen in person by Dr Sanchez 8/3/20 and instructed to follow up in 3-4 months. Currently nothing scheduled.    [facility] located in [place], [place]     Diltiazem CD 180mg/24 last filled 3/15/21 for 90 days

## 2021-07-12 NOTE — TELEPHONE ENCOUNTER
[facility] Call Center    Phone Message    May a detailed message be left on voicemail: yes     Reason for Call: Medication Refill Request    Has the patient contacted the pharmacy for the refill? Yes   Name of medication being requested: diltiazem ER   Provider who prescribed the medication: Dr. Sanchez  Pharmacy: [id number] [address]    Patient wants to check in about getting refill and what to do now that he provider has left. Recall shows he should scheduled in adult cardio, but patient thought the provider was going to recommend a specific provider. Patient also said he had a surgery prior to Covid that didn't help and that Dr. Sanchez recommended some additional tests that were postponed due to Covid. He wants to check in about what to do about those as well.     Action Taken: Message routed to:  Other: Peds Cardio    Travel Screening: Not Applicable

## 2021-07-12 NOTE — LETTER
July 13, 2021      TO: Xavi PETER Arturo Macon  [address] [facility] [address]         Dear Xavi,    As you know, Dr. Sanchez is retiring.  She is recommending that you go to the [facility] and see either Dr. Ericka Jaimes or Dr. Karthik Balderas.  For EP she is recommending you see Dr. Ventura Engle.  They will let you know if they think an exercise test is needed and will assist with arranging it.  They also may have different recommendations on next steps and will discuss further in your appointment.      It is important to establish care in their clinic as soon as possible so there are no delays in getting your needed medication refills.  Dr. Sanchez has refilled a 3 month supply at this time but will be unable to refill any further refills needed.    To schedule these appointments you can call [phone].  Please contact the clinic with any questions or concerns.        Sincerely,    Sobia Vega RN Care Coordinator  [facility]

## 2021-07-13 NOTE — TELEPHONE ENCOUNTER
Per Dr. Sanchez, Xavi will need to be evaluated in the [facility].  Ventura Engle would be the EP doctor, and Ericka Jaimes or Karthik Balderas for cardiology.  If they think an exercise test is needed, they will arrange it.  They may recommend a different approach.    Called Xavi and let him know.  Let him know a 90 day supply of his medication was sent but he should call the Adult Cardio scheduling ASAP to establish care.  Also sent letter in mail with the above info and scheduling number for him to schedule.    Xavi verbalized understanding and will call back with any questions or concerns.    Sobia Vega, RN Care Coordinator  [facility]

## 2021-09-05 ENCOUNTER — HEALTH MAINTENANCE LETTER (OUTPATIENT)
Age: 31
End: 2021-09-05

## 2021-12-26 ENCOUNTER — HEALTH MAINTENANCE LETTER (OUTPATIENT)
Age: 31
End: 2021-12-26

## 2022-07-11 ENCOUNTER — TELEPHONE (OUTPATIENT)
Dept: PEDIATRIC CARDIOLOGY | Facility: CLINIC | Age: 32
End: 2022-07-11

## 2022-07-13 DIAGNOSIS — I47.10 SVT (SUPRAVENTRICULAR TACHYCARDIA) (H): ICD-10-CM

## 2022-07-13 DIAGNOSIS — Q22.5 EBSTEIN'S ANOMALY: Primary | ICD-10-CM

## 2022-07-13 NOTE — TELEPHONE ENCOUNTER
Date: 7/13/2022    Time of Call: 9:38 AM     Diagnosis: Ebstein's anomaly     [ TORB ] Ordering provider: Dr. Balderas  Order: ECHO, labs, EKG, 7 day zio and appointment with Dr. Balderas and Dr. Engle     Order received by: RANDALL Diaz     Follow-up/additional notes: Spoke with Dr. Balderas about this referral and it has been some time since he has been seen and he needs to get in. Referral from Dr. Sanchez. Sent to scheduling.  Emily Morillo RN on 7/13/2022 at 9:43 AM

## 2022-10-03 ENCOUNTER — ALLIED HEALTH/NURSE VISIT (OUTPATIENT)
Dept: CARDIOLOGY | Facility: CLINIC | Age: 32
End: 2022-10-03
Payer: COMMERCIAL

## 2022-10-03 DIAGNOSIS — Q22.5 EBSTEIN'S ANOMALY: ICD-10-CM

## 2022-10-03 DIAGNOSIS — I47.10 SVT (SUPRAVENTRICULAR TACHYCARDIA) (H): ICD-10-CM

## 2022-10-12 DIAGNOSIS — I47.10 SVT (SUPRAVENTRICULAR TACHYCARDIA) (H): ICD-10-CM

## 2022-10-12 DIAGNOSIS — Q22.5 EBSTEIN'S ANOMALY: ICD-10-CM

## 2022-10-12 NOTE — TELEPHONE ENCOUNTER
diltiazem ER COATED BEADS (CARDIZEM CD/CARTIA XT) 180 MG 24 hr capsule  Last Written Prescription Date:   7/12/2021  Last Fill Quantity: 90,   # refills: 0  Last Office Visit :  8/3/2020  Future Office visit:   11/25/2022    Routing refill request to provider for review/approval because:  Needing updated B/P and Labs for this med  Refer to clinic for review and refills for Pt care.       Shahnaz Benson RN  Central Triage Red Flags/Med Refills

## 2022-10-12 NOTE — TELEPHONE ENCOUNTER
[facility] Call Center    Phone Message    May a detailed message be left on voicemail: yes     Reason for Call: Medication Refill Request    Has the patient contacted the pharmacy for the refill? Yes   Name of medication being requested: diltiazem ER COATED BEADS (CARDIZEM CD/CARTIA XT) 180 MG 24 hr capsule  Provider who prescribed the medication: Breanna Sanchez MD  Pharmacy: [id number] [address]  Date medication is needed: ASAP       Action Taken: Other: cardio    Travel Screening: Not Applicable     Thank you!  [facility]

## 2022-10-17 RX ORDER — DILTIAZEM HYDROCHLORIDE 180 MG/1
180 CAPSULE, COATED, EXTENDED RELEASE ORAL
Qty: 90 CAPSULE | Refills: 3 | Status: SHIPPED | OUTPATIENT
Start: 2022-10-17 | End: 2023-10-29

## 2022-10-23 ENCOUNTER — HEALTH MAINTENANCE LETTER (OUTPATIENT)
Age: 32
End: 2022-10-23

## 2022-11-11 ENCOUNTER — HOSPITAL ENCOUNTER (EMERGENCY)
Facility: CLINIC | Age: 32
Discharge: HOME | End: 2022-11-11
Attending: FAMILY MEDICINE | Admitting: FAMILY MEDICINE
Payer: COMMERCIAL

## 2022-11-11 VITALS
SYSTOLIC BLOOD PRESSURE: 108 MMHG | RESPIRATION RATE: 8 BRPM | OXYGEN SATURATION: 97 % | TEMPERATURE: 96.8 F | WEIGHT: 220 LBS | DIASTOLIC BLOOD PRESSURE: 75 MMHG | BODY MASS INDEX: 30.8 KG/M2 | HEART RATE: 85 BPM | HEIGHT: 71 IN

## 2022-11-11 DIAGNOSIS — I47.19 ATRIAL TACHYCARDIA (H): ICD-10-CM

## 2022-11-11 LAB
ANION GAP SERPL CALCULATED.3IONS-SCNC: 12 MMOL/L (ref 7–15)
BASOPHILS # BLD AUTO: 0.1 10E3/UL (ref 0–0.2)
BASOPHILS NFR BLD AUTO: 1 %
BUN SERPL-MCNC: 11.5 MG/DL (ref 6–20)
CALCIUM SERPL-MCNC: 10 MG/DL (ref 8.6–10)
CHLORIDE SERPL-SCNC: 102 MMOL/L (ref 98–107)
CREAT SERPL-MCNC: 0.89 MG/DL (ref 0.67–1.17)
DEPRECATED HCO3 PLAS-SCNC: 26 MMOL/L (ref 22–29)
EOSINOPHIL # BLD AUTO: 0.2 10E3/UL (ref 0–0.7)
EOSINOPHIL NFR BLD AUTO: 2 %
ERYTHROCYTE [DISTWIDTH] IN BLOOD BY AUTOMATED COUNT: 12 % (ref 10–15)
GFR SERPL CREATININE-BSD FRML MDRD: >90 ML/MIN/1.73M2
GLUCOSE SERPL-MCNC: 112 MG/DL (ref 70–99)
HCT VFR BLD AUTO: 44.7 % (ref 40–53)
HGB BLD-MCNC: 15.8 G/DL (ref 13.3–17.7)
HOLD SPECIMEN: NORMAL
IMM GRANULOCYTES # BLD: 0 10E3/UL
IMM GRANULOCYTES NFR BLD: 0 %
LYMPHOCYTES # BLD AUTO: 2.9 10E3/UL (ref 0.8–5.3)
LYMPHOCYTES NFR BLD AUTO: 31 %
MCH RBC QN AUTO: 30.9 PG (ref 26.5–33)
MCHC RBC AUTO-ENTMCNC: 35.3 G/DL (ref 31.5–36.5)
MCV RBC AUTO: 87 FL (ref 78–100)
MONOCYTES # BLD AUTO: 0.6 10E3/UL (ref 0–1.3)
MONOCYTES NFR BLD AUTO: 7 %
NEUTROPHILS # BLD AUTO: 5.4 10E3/UL (ref 1.6–8.3)
NEUTROPHILS NFR BLD AUTO: 59 %
NRBC # BLD AUTO: 0 10E3/UL
NRBC BLD AUTO-RTO: 0 /100
PLATELET # BLD AUTO: 263 10E3/UL (ref 150–450)
POTASSIUM SERPL-SCNC: 3.9 MMOL/L (ref 3.4–5.3)
RBC # BLD AUTO: 5.12 10E6/UL (ref 4.4–5.9)
SODIUM SERPL-SCNC: 140 MMOL/L (ref 136–145)
TSH SERPL DL<=0.005 MIU/L-ACNC: 2.09 UIU/ML (ref 0.3–4.2)
WBC # BLD AUTO: 9.2 10E3/UL (ref 4–11)

## 2022-11-11 PROCEDURE — 99285 EMERGENCY DEPT VISIT HI MDM: CPT | Mod: 25 | Performed by: FAMILY MEDICINE

## 2022-11-11 PROCEDURE — 93005 ELECTROCARDIOGRAM TRACING: CPT | Performed by: FAMILY MEDICINE

## 2022-11-11 PROCEDURE — 84443 ASSAY THYROID STIM HORMONE: CPT | Performed by: FAMILY MEDICINE

## 2022-11-11 PROCEDURE — 36415 COLL VENOUS BLD VENIPUNCTURE: CPT | Performed by: FAMILY MEDICINE

## 2022-11-11 PROCEDURE — 96374 THER/PROPH/DIAG INJ IV PUSH: CPT | Performed by: FAMILY MEDICINE

## 2022-11-11 PROCEDURE — 85025 COMPLETE CBC W/AUTO DIFF WBC: CPT | Performed by: FAMILY MEDICINE

## 2022-11-11 PROCEDURE — 250N000009 HC RX 250: Performed by: FAMILY MEDICINE

## 2022-11-11 PROCEDURE — 93005 ELECTROCARDIOGRAM TRACING: CPT | Mod: 76 | Performed by: FAMILY MEDICINE

## 2022-11-11 PROCEDURE — 93010 ELECTROCARDIOGRAM REPORT: CPT | Performed by: FAMILY MEDICINE

## 2022-11-11 PROCEDURE — 80048 BASIC METABOLIC PNL TOTAL CA: CPT | Performed by: FAMILY MEDICINE

## 2022-11-11 RX ORDER — METOPROLOL TARTRATE 1 MG/ML
10 INJECTION, SOLUTION INTRAVENOUS ONCE
Status: COMPLETED | OUTPATIENT
Start: 2022-11-11 | End: 2022-11-11

## 2022-11-11 RX ORDER — METOPROLOL SUCCINATE 50 MG/1
50 TABLET, EXTENDED RELEASE ORAL DAILY
Qty: 30 TABLET | Refills: 0 | Status: SHIPPED | OUTPATIENT
Start: 2022-11-11 | End: 2022-12-16

## 2022-11-11 RX ADMIN — METOPROLOL TARTRATE 6 MG: 1 INJECTION, SOLUTION INTRAVENOUS at 12:17

## 2022-11-11 ASSESSMENT — ACTIVITIES OF DAILY LIVING (ADL): ADLS_ACUITY_SCORE: 35

## 2022-11-11 NOTE — LETTER
November 11, 2022      To Whom It May Concern:      Xavi Mtz was seen in our Emergency Department today, 11/11/22.  I expect his condition to improve over the next 1-2 days.  He may return to work/school when improved.    Sincerely,        Fredi Martínez MD

## 2022-11-11 NOTE — DISCHARGE INSTRUCTIONS
RETURN TO THE EMERGENCY ROOM FOR THE FOLLOWING:    Recurring tachycardia that is unrelenting and is causing symptoms that are concerning to you, fainting, new trouble with breathing, or at anytime for any concern.    FOLLOW UP:    With Dr. Chi on 11/25 as scheduled.    TREATMENT RECOMMENDATIONS:    Stop aspirin daily.    Continue with other medication as previously prescribed.    Add Toprol-XL 50 mg once a day.  Start this medicine today.    Add Eliquis.  Start this medicine today.    NURSE ADVICE LINE:  [phone] or [phone]

## 2022-11-11 NOTE — ED TRIAGE NOTES
Patient has a history of SVT. Reports rapid heart rate that started about 45 minutes ago.      Triage Assessment     Row Name 11/11/22 1137       Triage Assessment (Adult)    Airway WDL WDL       Respiratory WDL    Respiratory WDL WDL       Skin Circulation/Temperature WDL    Skin Circulation/Temperature WDL WDL       Cardiac WDL    Cardiac WDL X       Peripheral/Neurovascular WDL    Peripheral Neurovascular WDL WDL       Cognitive/Neuro/Behavioral WDL    Cognitive/Neuro/Behavioral WDL WDL

## 2022-11-11 NOTE — ED NOTES
Dose change - 6 mg of Metoprolol given. 10 mg was ordered. After 6 mg patients HR was 83 . Writer called TANESHA STEELE/CALEB to hold off on the full dose. Will continue to monitor.

## 2022-11-16 NOTE — TELEPHONE ENCOUNTER
DIAGNOSIS: ebsteins, SVT   DATE OF APPT: 11.25.22   NOTES STATUS DETAILS   OFFICE NOTE from other cardiologist    Internal 8.3.20 Dr Breanna Sanchze, [facility] Cardio   SUMMARY from hospital/ED   Internal/Care Everywhere 11.11.22 [facility] ED  12.21.21 [facility] ED   MEDICATION LIST   Internal    DIAGNOSTIC PROCEDURES     EKG   Internal 11.11.22   Monitor Reports   Internal 10.3.22

## 2022-11-24 ASSESSMENT — ENCOUNTER SYMPTOMS
HYPOTENSION: 0
LIGHT-HEADEDNESS: 1
LEG PAIN: 0
LEG PAIN: 0
PALPITATIONS: 1
LIGHT-HEADEDNESS: 1
SYNCOPE: 0
HYPOTENSION: 0
EXERCISE INTOLERANCE: 1
PALPITATIONS: 1
EXERCISE INTOLERANCE: 1
SLEEP DISTURBANCES DUE TO BREATHING: 0
SYNCOPE: 0
HYPERTENSION: 0
SLEEP DISTURBANCES DUE TO BREATHING: 0
ORTHOPNEA: 0
ORTHOPNEA: 0
HYPERTENSION: 0

## 2022-11-25 ENCOUNTER — OFFICE VISIT (OUTPATIENT)
Dept: CARDIOLOGY | Facility: CLINIC | Age: 32
End: 2022-11-25
Payer: COMMERCIAL

## 2022-11-25 ENCOUNTER — OFFICE VISIT (OUTPATIENT)
Dept: CARDIOLOGY | Facility: CLINIC | Age: 32
End: 2022-11-25
Attending: INTERNAL MEDICINE
Payer: COMMERCIAL

## 2022-11-25 ENCOUNTER — PRE VISIT (OUTPATIENT)
Dept: CARDIOLOGY | Facility: CLINIC | Age: 32
End: 2022-11-25

## 2022-11-25 ENCOUNTER — ANCILLARY PROCEDURE (OUTPATIENT)
Dept: CARDIOLOGY | Facility: CLINIC | Age: 32
End: 2022-11-25
Payer: COMMERCIAL

## 2022-11-25 ENCOUNTER — LAB (OUTPATIENT)
Dept: LAB | Facility: CLINIC | Age: 32
End: 2022-11-25
Payer: COMMERCIAL

## 2022-11-25 VITALS
HEIGHT: 70 IN | DIASTOLIC BLOOD PRESSURE: 73 MMHG | WEIGHT: 218.5 LBS | OXYGEN SATURATION: 96 % | HEART RATE: 77 BPM | SYSTOLIC BLOOD PRESSURE: 109 MMHG | BODY MASS INDEX: 31.28 KG/M2

## 2022-11-25 VITALS
BODY MASS INDEX: 31.28 KG/M2 | HEART RATE: 77 BPM | SYSTOLIC BLOOD PRESSURE: 109 MMHG | DIASTOLIC BLOOD PRESSURE: 73 MMHG | WEIGHT: 218.5 LBS | HEIGHT: 70 IN | OXYGEN SATURATION: 96 %

## 2022-11-25 DIAGNOSIS — I48.92 ATRIAL FLUTTER, UNSPECIFIED TYPE (H): ICD-10-CM

## 2022-11-25 DIAGNOSIS — I47.10 SVT (SUPRAVENTRICULAR TACHYCARDIA) (H): ICD-10-CM

## 2022-11-25 DIAGNOSIS — Q22.5 EBSTEIN'S ANOMALY: Primary | ICD-10-CM

## 2022-11-25 DIAGNOSIS — I48.92 ATRIAL FLUTTER, UNSPECIFIED TYPE (H): Primary | ICD-10-CM

## 2022-11-25 DIAGNOSIS — Q22.5 EBSTEIN'S ANOMALY: ICD-10-CM

## 2022-11-25 LAB
ALBUMIN SERPL BCG-MCNC: 4.8 G/DL (ref 3.5–5.2)
ALP SERPL-CCNC: 78 U/L (ref 40–129)
ALT SERPL W P-5'-P-CCNC: 33 U/L (ref 10–50)
ANION GAP SERPL CALCULATED.3IONS-SCNC: 11 MMOL/L (ref 7–15)
AST SERPL W P-5'-P-CCNC: 33 U/L (ref 10–50)
BASOPHILS # BLD AUTO: 0.1 10E3/UL (ref 0–0.2)
BASOPHILS NFR BLD AUTO: 1 %
BILIRUB SERPL-MCNC: 0.7 MG/DL
BUN SERPL-MCNC: 11.2 MG/DL (ref 6–20)
CALCIUM SERPL-MCNC: 9.5 MG/DL (ref 8.6–10)
CHLORIDE SERPL-SCNC: 103 MMOL/L (ref 98–107)
CHOLEST SERPL-MCNC: 170 MG/DL
CREAT SERPL-MCNC: 0.88 MG/DL (ref 0.67–1.17)
DEPRECATED HCO3 PLAS-SCNC: 25 MMOL/L (ref 22–29)
EOSINOPHIL # BLD AUTO: 0.2 10E3/UL (ref 0–0.7)
EOSINOPHIL NFR BLD AUTO: 2 %
ERYTHROCYTE [DISTWIDTH] IN BLOOD BY AUTOMATED COUNT: 12.2 % (ref 10–15)
GFR SERPL CREATININE-BSD FRML MDRD: >90 ML/MIN/1.73M2
GLUCOSE SERPL-MCNC: 93 MG/DL (ref 70–99)
HCT VFR BLD AUTO: 47.3 % (ref 40–53)
HDLC SERPL-MCNC: 31 MG/DL
HGB BLD-MCNC: 16.2 G/DL (ref 13.3–17.7)
IMM GRANULOCYTES # BLD: 0 10E3/UL
IMM GRANULOCYTES NFR BLD: 0 %
LDLC SERPL CALC-MCNC: 86 MG/DL
LYMPHOCYTES # BLD AUTO: 2.6 10E3/UL (ref 0.8–5.3)
LYMPHOCYTES NFR BLD AUTO: 37 %
MCH RBC QN AUTO: 30.6 PG (ref 26.5–33)
MCHC RBC AUTO-ENTMCNC: 34.2 G/DL (ref 31.5–36.5)
MCV RBC AUTO: 89 FL (ref 78–100)
MONOCYTES # BLD AUTO: 0.5 10E3/UL (ref 0–1.3)
MONOCYTES NFR BLD AUTO: 7 %
NEUTROPHILS # BLD AUTO: 3.7 10E3/UL (ref 1.6–8.3)
NEUTROPHILS NFR BLD AUTO: 53 %
NONHDLC SERPL-MCNC: 139 MG/DL
NRBC # BLD AUTO: 0 10E3/UL
NRBC BLD AUTO-RTO: 0 /100
PLATELET # BLD AUTO: 253 10E3/UL (ref 150–450)
POTASSIUM SERPL-SCNC: 4.2 MMOL/L (ref 3.4–5.3)
PROT SERPL-MCNC: 7.5 G/DL (ref 6.4–8.3)
RBC # BLD AUTO: 5.29 10E6/UL (ref 4.4–5.9)
SODIUM SERPL-SCNC: 139 MMOL/L (ref 136–145)
TRIGL SERPL-MCNC: 267 MG/DL
TSH SERPL DL<=0.005 MIU/L-ACNC: 1.31 UIU/ML (ref 0.3–4.2)
WBC # BLD AUTO: 7 10E3/UL (ref 4–11)

## 2022-11-25 PROCEDURE — 93325 DOPPLER ECHO COLOR FLOW MAPG: CPT | Performed by: PEDIATRICS

## 2022-11-25 PROCEDURE — 93005 ELECTROCARDIOGRAM TRACING: CPT

## 2022-11-25 PROCEDURE — 93303 ECHO TRANSTHORACIC: CPT | Performed by: PEDIATRICS

## 2022-11-25 PROCEDURE — 99205 OFFICE O/P NEW HI 60 MIN: CPT | Mod: 25 | Performed by: INTERNAL MEDICINE

## 2022-11-25 PROCEDURE — 80050 GENERAL HEALTH PANEL: CPT | Performed by: PATHOLOGY

## 2022-11-25 PROCEDURE — G0463 HOSPITAL OUTPT CLINIC VISIT: HCPCS | Mod: 25

## 2022-11-25 PROCEDURE — 99215 OFFICE O/P EST HI 40 MIN: CPT | Mod: 25

## 2022-11-25 PROCEDURE — 80061 LIPID PANEL: CPT | Performed by: PATHOLOGY

## 2022-11-25 PROCEDURE — 93320 DOPPLER ECHO COMPLETE: CPT | Performed by: PEDIATRICS

## 2022-11-25 PROCEDURE — 36415 COLL VENOUS BLD VENIPUNCTURE: CPT | Performed by: PATHOLOGY

## 2022-11-25 RX ORDER — LIDOCAINE 40 MG/G
CREAM TOPICAL
Status: CANCELLED | OUTPATIENT
Start: 2022-11-25

## 2022-11-25 RX ORDER — SODIUM CHLORIDE 9 MG/ML
INJECTION, SOLUTION INTRAVENOUS CONTINUOUS
Status: CANCELLED | OUTPATIENT
Start: 2022-11-25

## 2022-11-25 ASSESSMENT — PAIN SCALES - GENERAL
PAINLEVEL: NO PAIN (0)
PAINLEVEL: NO PAIN (0)

## 2022-11-25 NOTE — LETTER
11/25/2022      RE: Xavi Mtz  [id number] [facility] [id number]       Adult Congenital Cardiology Visit    Patient:  Xavi Mtz MRN:  [id number]   YOB: 1990 Age:  32 year old   Date of Visit:  Nov 25, 2022 PCP:  Hamzah Atkins MD     Dear Hamzah Savage MD:    I had the pleasure of seeing your patient Xavi Mtz at the [facility] on Nov 25, 2022.   Xavi is a 33 yo male with Ebsteins anomaly       Past medical history:  The past medical history was reviewed with the patient and family today and updated.  ***.    Past Medical History:   Diagnosis Date     Congenital heart disease     Ebstein's Anomaly       Current Outpatient Medications   Medication Sig Dispense Refill     amoxicillin (AMOXIL) 500 MG capsule Take 4 capsules (2,000 mg) by mouth once as needed (one hour prior to dental procedures.) (Patient not taking: Reported on 8/3/2020) 4 capsule 3     apixaban ANTICOAGULANT (ELIQUIS) 5 MG tablet 5 mg twice a day for 60 days. 60 tablet 1     aspirin (ASA) 81 MG tablet Take 1 tablet (81 mg) by mouth daily 90 tablet 3     diltiazem ER COATED BEADS (CARDIZEM CD/CARTIA XT) 180 MG 24 hr capsule Take 1 capsule (180 mg) by mouth every morning (before breakfast) 90 capsule 3     metoprolol succinate ER (TOPROL XL) 50 MG 24 hr tablet Take 1 tablet (50 mg) by mouth daily for 30 days 30 tablet 0     warfarin ANTICOAGULANT (COUMADIN) 5 MG tablet Take 10mg(2 tabs) on Wed.,7.5mg(1 and 1/2 tabs) all other days or as directed.         Allergies   Allergen Reactions     Ceclor [Cefaclor]      Mouth ulcers         Family History:   Family History   Problem Relation Age of Onset     Diabetes Mother           Social history:    Social History     Occupational History     Not on file   Tobacco Use     Smoking status: Some Days     Packs/day: 0.50     Years: 10.00     Pack years: 5.00     Types: Cigarettes      Smokeless tobacco: Current     Types: Chew     Tobacco comments:      Smokes E-Cigs   Substance and Sexual Activity     Alcohol use: No     Alcohol/week: 0.0 standard drinks     Drug use: No     Sexual activity: Not on file       Marital Status:       Review of Systems: A comprehensive review of systems was performed and is negative, except as noted in the HPI and PMH  Review of Systems     Physical exam:  His vitals were not taken for this visit.   His body mass index is unknown because there is no height or weight on file.  His body surface area is unknown because there is no height or weight on file.    There is no central or peripheral cyanosis. Pupils are reactive and sclera are not jaundiced. There is no conjunctival injection or discharge. EOMI. Mucous membranes are moist and pink.   Lungs are clear to ausculation bilaterally with no wheezes, rales or rhonchi. There is no increased work of breathing, retractions or nasal flaring. Precordium is quiet with a normally placed apical impulse. On auscultation, heart sounds are regular with normal S1 and physiologically split S2. There are no murmurs, rubs or gallops.  Abdomen is soft and non-tender without masses or hepatomegaly. Femoral pulses are normal with no brachial femoral delay.Skin is without rashes, lesions, or significant bruising. Extremities are warm and well-perfused with no cyanosis, clubbing or edema. Peripheral pulses are normal and there is < 2 sec capillary refill. Patient is alert and oriented and moves all extremities equally with normal tone.         12 Lead EKG performed *** shows normal sinus rhythm at a rate of *** bpm with normal intervals and no chamber enlargement or hypertrophy.    An echocardiogram performed today is notable for ***    Results for orders placed or performed in visit on 11/25/22   EKG 12-lead, tracing only (Same Day)     Status: None (Preliminary result)   Result Value Ref Range    Systolic Blood Pressure  mmHg     Diastolic Blood Pressure  mmHg    Ventricular Rate 71 BPM    Atrial Rate 71 BPM    CO Interval 208 ms    QRS Duration 164 ms     ms    QTc 493 ms    P Axis 17 degrees    R AXIS 64 degrees    T Axis 21 degrees    Interpretation ECG       Sinus rhythm  Right bundle branch block  Cannot rule out Inferior infarct , age undetermined  Abnormal ECG  When compared with ECG of 20-JAN-2020 09:35,  Minimal criteria for Inferior infarct are now Present     Results for orders placed or performed in visit on 11/25/22   Lipid panel reflex to direct LDL Fasting     Status: Abnormal   Result Value Ref Range    Cholesterol 170 <200 mg/dL    Triglycerides 267 (H) <150 mg/dL    Direct Measure HDL 31 (L) >=40 mg/dL    LDL Cholesterol Calculated 86 <=100 mg/dL    Non HDL Cholesterol 139 (H) <130 mg/dL    Narrative    Cholesterol  Desirable:  <200 mg/dL    Triglycerides  Normal:  Less than 150 mg/dL  Borderline High:  150-199 mg/dL  High:  200-499 mg/dL  Very High:  Greater than or equal to 500 mg/dL    Direct Measure HDL  Female:  Greater than or equal to 50 mg/dL   Male:  Greater than or equal to 40 mg/dL    LDL Cholesterol  Desirable:  <100mg/dL  Above Desirable:  100-129 mg/dL   Borderline High:  130-159 mg/dL   High:  160-189 mg/dL   Very High:  >= 190 mg/dL    Non HDL Cholesterol  Desirable:  130 mg/dL  Above Desirable:  130-159 mg/dL  Borderline High:  160-189 mg/dL  High:  190-219 mg/dL  Very High:  Greater than or equal to 220 mg/dL   Comprehensive metabolic panel     Status: Normal   Result Value Ref Range    Sodium 139 136 - 145 mmol/L    Potassium 4.2 3.4 - 5.3 mmol/L    Chloride 103 98 - 107 mmol/L    Carbon Dioxide (CO2) 25 22 - 29 mmol/L    Anion Gap 11 7 - 15 mmol/L    Urea Nitrogen 11.2 6.0 - 20.0 mg/dL    Creatinine 0.88 0.67 - 1.17 mg/dL    Calcium 9.5 8.6 - 10.0 mg/dL    Glucose 93 70 - 99 mg/dL    Alkaline Phosphatase 78 40 - 129 U/L    AST 33 10 - 50 U/L    ALT 33 10 - 50 U/L    Protein Total 7.5 6.4 - 8.3  "g/dL    Albumin 4.8 3.5 - 5.2 g/dL    Bilirubin Total 0.7 <=1.2 mg/dL    GFR Estimate >90 >60 mL/min/1.73m2   TSH with free T4 reflex     Status: Normal   Result Value Ref Range    TSH 1.31 0.30 - 4.20 uIU/mL   CBC with platelets and differential     Status: None   Result Value Ref Range    WBC Count 7.0 4.0 - 11.0 10e3/uL    RBC Count 5.29 4.40 - 5.90 10e6/uL    Hemoglobin 16.2 13.3 - 17.7 g/dL    Hematocrit 47.3 40.0 - 53.0 %    MCV 89 78 - 100 fL    MCH 30.6 26.5 - 33.0 pg    MCHC 34.2 31.5 - 36.5 g/dL    RDW 12.2 10.0 - 15.0 %    Platelet Count 253 150 - 450 10e3/uL    % Neutrophils 53 %    % Lymphocytes 37 %    % Monocytes 7 %    % Eosinophils 2 %    % Basophils 1 %    % Immature Granulocytes 0 %    NRBCs per 100 WBC 0 <1 /100    Absolute Neutrophils 3.7 1.6 - 8.3 10e3/uL    Absolute Lymphocytes 2.6 0.8 - 5.3 10e3/uL    Absolute Monocytes 0.5 0.0 - 1.3 10e3/uL    Absolute Eosinophils 0.2 0.0 - 0.7 10e3/uL    Absolute Basophils 0.1 0.0 - 0.2 10e3/uL    Absolute Immature Granulocytes 0.0 <=0.4 10e3/uL    Absolute NRBCs 0.0 10e3/uL   CBC with platelets differential     Status: None    Narrative    The following orders were created for panel order CBC with platelets differential.  Procedure                               Abnormality         Status                     ---------                               -----------         ------                     CBC with platelets and d...[[id number]]                      Final result                 Please view results for these tests on the individual orders.         In summary, Xavi is a 32 year old with ***    Thank you for the opportunity to participate in Xavi's care.  I {recommendations:[id number]::\"did not recommend any activity restrictions or endocarditis prophylaxis.\"}  I asked to see him back in *** with ***Please do not hesitate to call with questions or concerns.      Diagnoses:   1. ***  2.     Sincerely,    Karthik Balderas M.D.   of " Pediatrics  Pediatric and Adult Congenital Cardiology  [facility]  Pediatric Cardiology Office [phone]  Adult Congenital Cardiology Triage and Scheduling [phone]      CC:  Xavi Mtz

## 2022-11-25 NOTE — PROGRESS NOTES
Adult Congenital Cardiology Visit    Patient:  Xavi Mtz MRN:  [id number]   YOB: 1990 Age:  32 year old   Date of Visit:  Nov 25, 2022 PCP:  Hamzah Atkins MD     Dear Dr. Atkins,     I had the pleasure of meeting your patient Xavi Mtz at the [facility] on Nov 25, 2022.   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 [facility] Engle from  for management of his Ebsteins anomaly and recurrent atrial tachycardia. Xavi reports that he has had very poor exercise tolerance for the last 10 years. He is not able to play outside for any length of time with his 8 year old son. He is working but fatigued with intermittent palpitations. . No LOC, edema, orthopnea.  HE presented in intermittent atrial tachycardia in November 2022 and is on eliquis, diltiazem, Toprol XL and ASA. Ablation is scheduled for January 2023.         Past medical history:    Born with Ebsteins anomaly  S/p bioprosthetic tricuspid valve replacement 1999 [facility]  Tricuspid valve balloon valvuloplasty 2017 [facility]  Cardiac Cath 2017 with RVEDP 13, LVEDP 12 PAP 24/9 CI 1.92 L/min/m2.   Placement of a 29 mm Sloan 3 transcatheter valve in tricuspid position with empiric ablation of  IVC/TV in 12/2019  Tobacco use- ongoing      Past Medical History:   Diagnosis Date     Congenital heart disease     Ebstein's Anomaly       Current Outpatient Medications   Medication Sig Dispense Refill     amoxicillin (AMOXIL) 500 MG capsule Take 4 capsules (2,000 mg) by mouth once as needed (one hour prior to dental procedures.) (Patient not taking: Reported on 8/3/2020) 4 capsule 3     apixaban ANTICOAGULANT (ELIQUIS) 5 MG tablet 5 mg twice a day for 60 days. 60 tablet 1     aspirin (ASA) 81 MG tablet Take 1 tablet (81 mg) by mouth  "daily 90 tablet 3     diltiazem ER COATED BEADS (CARDIZEM CD/CARTIA XT) 180 MG 24 hr capsule Take 1 capsule (180 mg) by mouth every morning (before breakfast) 90 capsule 3     metoprolol succinate ER (TOPROL XL) 50 MG 24 hr tablet Take 1 tablet (50 mg) by mouth daily for 30 days 30 tablet 0     warfarin ANTICOAGULANT (COUMADIN) 5 MG tablet Take 10mg(2 tabs) on Wed.,7.5mg(1 and 1/2 tabs) all other days or as directed.         Allergies   Allergen Reactions     Ceclor [Cefaclor]      Mouth ulcers         Family History:   Family History   Problem Relation Age of Onset     Diabetes Mother           Social history:  9 yo son healthy, no cardiac disease, expecting second child this spring.   Social History     Occupational History     Not on file   Tobacco Use     Smoking status: Some Days     Packs/day: 0.50     Years: 10.00     Pack years: 5.00     Types: Cigarettes     Smokeless tobacco: Current     Types: Chew     Tobacco comments:      Smokes E-Cigs   Substance and Sexual Activity     Alcohol use: No     Alcohol/week: 0.0 standard drinks     Drug use: No     Sexual activity: Not on file       Marital Status:       Review of Systems: A comprehensive review of systems was performed and is negative, except as noted in the HPI and PMH  Review of Systems     Physical exam: Vitals: /73 (BP Location: Left arm, Patient Position: Chair, Cuff Size: Adult Large)   Pulse 77   Ht 1.79 m (5' 10.47\")   Wt 99.1 kg (218 lb 8 oz)   SpO2 96%   BMI 30.93 kg/m    BMI= Body mass index is 30.93 kg/m .   There is no central or peripheral cyanosis. Pupils are reactive and sclera are not jaundiced. There is no conjunctival injection or discharge. EOMI. Mucous membranes are moist and pink.   Lungs are clear to ausculation bilaterally with no wheezes, rales or rhonchi. There is no increased work of breathing, retractions or nasal flaring. Precordium is quiet with a normally placed apical impulse. On auscultation, heart " sounds are regular with an accentuated S1 and normal S2. There are no murmurs, rubs or gallops.  Abdomen is soft and non-tender without masses or hepatomegaly. Femoral pulses are normal with no brachial femoral delay.Skin is without rashes, lesions, or significant bruising. Extremities are warm and well-perfused with no cyanosis, clubbing or edema. Peripheral pulses are normal and there is < 2 sec capillary refill. Patient is alert and oriented and moves all extremities equally with normal tone.     An echocardiogram performed today is notable for:  Tricuspid valve mean gradient 6 mmHg. No signficant tricuspid valve  insufficiency. There is moderate right ventricular enlargement with low normal systolic function. There is ventricular septal bouncing. Qualitatively low normal left ventricular systolic function. There is flow reversal in the  hepatic veins during atrial systole.    Recent Results (from the past 4320 hour(s))   Echo Congenital Adult    Narrative    [id number]  [id number]  [id number]  [id number]^JAN^CHERRI^ROSALIA                                                               Study ID: [id number]                                                 [facility] [address] [address]                                                Phone: [phone]                                Pediatric Echocardiogram  ______________________________________________________________________________  Name: BLADE MORFIN  Study Date: 2022 12:21 PM                   Patient Location: [facility]  MRN: [id number]                                   Age: 32 yrs  : 1990                                   BP: 103/68 mmHg  Gender: Male                                      HR: 67  Patient Class: Outpatient                         Height: 71  in  Ordering Provider: CHERRI MONTOYA              Weight: 217 lb  Referring Provider: JAN CHERRI ROSALIA             BSA: 2.2 m2  Performed By: Tesha Richardson  Report approved by: Frandy Gomes MD  Reason For Study: Ebstein's anomaly, SVT (supraventricular tachycardia) (H)  ______________________________________________________________________________  ##### CONCLUSIONS #####  Patient with history of Ebstein's Anomaly, after 33 mm tricuspid valve  replacement. Percutaneous 25mm balloon valvuloplasty of the tricuspid valve  (7/27/17). Now after 29mm Sloan 3 ranscatheter valve on 12/6/2019 at [place].  Tricuspid valve mean gradient 6 mmHg. No signficant tricuspid valve  insufficiency. There is moderate right ventricular enlargement with low normal  systolic function. There is ventricular septal bouncing. Qualitatively low  normal left ventricular systolic function. There is flow reversal in the  hepatic veins during atrial systole.  ______________________________________________________________________________  Technical information:  A complete two dimensional, MMODE, spectral and color Doppler transthoracic  echocardiogram is performed. Images are obtained from parasternal, apical,  subcostal and suprasternal notch views. Technically difficult study due to  poor acoustic windows. Prior echocardiogram available for comparison. ECG  tracing shows regular rhythm.     Segmental Anatomy:  There is normal atrial arrangement, with concordant atrioventricular and  ventriculoarterial connections.     Systemic and pulmonary veins:  The inferior vena cava drains normally to the right atrium. There is flow  reversal in the hepatic veins during atrial systole. Color flow demonstrates  flow from at least one pulmonary vein entering the left atrium.     Atria and atrial septum:  The left atrium is normal in size. There is moderate right atrial enlargement.  The atrial septum is not well visualized.     Atrioventricular  valves:  Ebstein's anomaly of the tricuspid valve. Post tricuspid valve replacement  with a porcine prosthetic valve. Post percutaneous balloon valvuloplasty of  the tricuspid valve. Tricuspid valve mean gradient 6 mmHg. The mitral valve is  normal in appearance and motion. There is no mitral valve insufficiency.     Ventricles and Ventricular Septum:  There is moderate right ventricular enlargement. Low normal right ventricular  systolic function. There is ventricular septal bouncing. Normal left  ventricular size. The left ventricular dimmensions measured by MMODE are  affected by the abnormal septal motion. There is mild flattening of the  ventricular septum in systole. Low normal left ventricular systolic function.     Outflow tracts:  Normal great artery relationship. There is unobstructed flow through the right  ventricular outflow tract. The pulmonary valve motion is normal. There is  normal flow across the pulmonary valve. Trivial pulmonary valve insufficiency.  There is unobstructed flow through the left ventricular outflow tract.  Tricuspid aortic valve with normal appearance and motion. There is normal flow  across the aortic valve.     Great arteries:  The main pulmonary artery has normal appearance. There is unobstructed flow in  the main pulmonary artery. The pulmonary artery bifurcation is normal. There  is unobstructed flow in both branch pulmonary arteries. Normal ascending  aorta. The aortic arch appears normal. There is unobstructed antegrade flow in  the ascending, transverse arch, descending thoracic and abdominal aorta.     Coronaries:  The coronary arteries are not well visualized.     Effusions, catheters, cannulas and leads:  No pericardial effusion.     MMode/2D Measurements & Calculations  LA dimension: 3.8 cm                       Ao root diam: 2.8 cm  LA/Ao: 1.4                                 4 Chamber EF: 63.1 %  LVMI(BSA): 44.2 grams/m2                   LVMI(Height): 20.3  RWT(MM):  0.37     Doppler Measurements & Calculations  MV E max jaylen: 55.6 cm/sec               Ao V2 max: 81.9 cm/sec  MV A max jaylen: 48.0 cm/sec               Ao max P.7 mmHg  MV E/A: 1.2     LV V1 max: 75.9 cm/sec                  TV V2 max: 151.4 cm/sec  LV V1 max P.3 mmHg                  TV max P.2 mmHg                                          TV mean P.3 mmHg                                          TV V2 VTI: 67.1 cm  PA V2 max: 92.4 cm/sec                  LPA max jaylen: 77.1 cm/sec  PA max PG: 3.4 mmHg                     LPA max P.4 mmHg                                          RPA max jaylen: 69.4 cm/sec                                          RPA max P.9 mmHg     asc Ao max jaylen: 69.2 cm/sec           desc Ao max jaylen: 106.9 cm/sec  asc Ao max P.9 mmHg               desc Ao max P.6 mmHg  MPA max jaylen: 88.3 cm/sec  MPA max PG: 3.1 mmHg     [facility]ishop Z-Scores (Measurements & Calculations)  Measurement NameValue     Z-ScorePredictedNormal Range  IVSd(MM)        0.90 cm   -1.2   1.1      0.76 - 1.43  LVIDd(MM)       4.0 cm    -3.4   5.5      4.7 - 6.3  LVIDs(MM)       3.0 cm    -1.3   3.6      2.7 - 4.4  LVPWd(MM)       0.75 cm   -1.9   1.0      0.75 - 1.30  LV mass(C)d(MM) 99.1 grams-4.2   231.3    155.4 - 344.4  FS(MM)          25.1 %    -2.8   34.5     27.7 - 43.1     Report approved by: Kalee Benitez 2022 02:00 PM               Results for orders placed or performed in visit on 22   EKG 12-lead, tracing only (Same Day)     Status: None (Preliminary result)   Result Value Ref Range    Systolic Blood Pressure  mmHg    Diastolic Blood Pressure  mmHg    Ventricular Rate 71 BPM    Atrial Rate 71 BPM    KY Interval 208 ms    QRS Duration 164 ms     ms    QTc 493 ms    P Axis 17 degrees    R AXIS 64 degrees    T Axis 21 degrees    Interpretation ECG       Sinus rhythm  Right bundle branch block  Cannot rule out Inferior infarct , age undetermined  Abnormal ECG  When compared  with ECG of 20-JAN-2020 09:35,  Minimal criteria for Inferior infarct are now Present     Results for orders placed or performed in visit on 11/25/22   Lipid panel reflex to direct LDL Fasting     Status: Abnormal   Result Value Ref Range    Cholesterol 170 <200 mg/dL    Triglycerides 267 (H) <150 mg/dL    Direct Measure HDL 31 (L) >=40 mg/dL    LDL Cholesterol Calculated 86 <=100 mg/dL    Non HDL Cholesterol 139 (H) <130 mg/dL    Narrative    Cholesterol  Desirable:  <200 mg/dL    Triglycerides  Normal:  Less than 150 mg/dL  Borderline High:  150-199 mg/dL  High:  200-499 mg/dL  Very High:  Greater than or equal to 500 mg/dL    Direct Measure HDL  Female:  Greater than or equal to 50 mg/dL   Male:  Greater than or equal to 40 mg/dL    LDL Cholesterol  Desirable:  <100mg/dL  Above Desirable:  100-129 mg/dL   Borderline High:  130-159 mg/dL   High:  160-189 mg/dL   Very High:  >= 190 mg/dL    Non HDL Cholesterol  Desirable:  130 mg/dL  Above Desirable:  130-159 mg/dL  Borderline High:  160-189 mg/dL  High:  190-219 mg/dL  Very High:  Greater than or equal to 220 mg/dL   Comprehensive metabolic panel     Status: Normal   Result Value Ref Range    Sodium 139 136 - 145 mmol/L    Potassium 4.2 3.4 - 5.3 mmol/L    Chloride 103 98 - 107 mmol/L    Carbon Dioxide (CO2) 25 22 - 29 mmol/L    Anion Gap 11 7 - 15 mmol/L    Urea Nitrogen 11.2 6.0 - 20.0 mg/dL    Creatinine 0.88 0.67 - 1.17 mg/dL    Calcium 9.5 8.6 - 10.0 mg/dL    Glucose 93 70 - 99 mg/dL    Alkaline Phosphatase 78 40 - 129 U/L    AST 33 10 - 50 U/L    ALT 33 10 - 50 U/L    Protein Total 7.5 6.4 - 8.3 g/dL    Albumin 4.8 3.5 - 5.2 g/dL    Bilirubin Total 0.7 <=1.2 mg/dL    GFR Estimate >90 >60 mL/min/1.73m2   TSH with free T4 reflex     Status: Normal   Result Value Ref Range    TSH 1.31 0.30 - 4.20 uIU/mL   CBC with platelets and differential     Status: None   Result Value Ref Range    WBC Count 7.0 4.0 - 11.0 10e3/uL    RBC Count 5.29 4.40 - 5.90 10e6/uL     Hemoglobin 16.2 13.3 - 17.7 g/dL    Hematocrit 47.3 40.0 - 53.0 %    MCV 89 78 - 100 fL    MCH 30.6 26.5 - 33.0 pg    MCHC 34.2 31.5 - 36.5 g/dL    RDW 12.2 10.0 - 15.0 %    Platelet Count 253 150 - 450 10e3/uL    % Neutrophils 53 %    % Lymphocytes 37 %    % Monocytes 7 %    % Eosinophils 2 %    % Basophils 1 %    % Immature Granulocytes 0 %    NRBCs per 100 WBC 0 <1 /100    Absolute Neutrophils 3.7 1.6 - 8.3 10e3/uL    Absolute Lymphocytes 2.6 0.8 - 5.3 10e3/uL    Absolute Monocytes 0.5 0.0 - 1.3 10e3/uL    Absolute Eosinophils 0.2 0.0 - 0.7 10e3/uL    Absolute Basophils 0.1 0.0 - 0.2 10e3/uL    Absolute Immature Granulocytes 0.0 <=0.4 10e3/uL    Absolute NRBCs 0.0 10e3/uL   CBC with platelets differential     Status: None    Narrative    The following orders were created for panel order CBC with platelets differential.  Procedure                               Abnormality         Status                     ---------                               -----------         ------                     CBC with platelets and d...[[id number]]                      Final result                 Please view results for these tests on the individual orders.         In summary, Xavi is a 32 year old with Ebsteins anomaly s/p tricuspid valve replacement followed by transcatheter TV valve in 2019. Now with intermittent atrial tachycardia and poor exercise tolerance. 2017 cath suggests overall low cardiac output with mildly diminished function of both left and right ventricles    Recommendations:  Agree with congenital CTA pre ablabtion  Continue current medications  Post ablation would schedule for CPX and repeat echocardiogram, /possible catheterization. He may be a candidate for Jemal shunt to offload his right ventricle if he does poorly with this testing.     Thank you for the opportunity to participate in Xavi's care.  I asked to see him back post ablation with an echocardiogram to assess ventricular function. Please do not  hesitate to call with questions or concerns.    A total of 45 minutes were spent in personal review of testing, including echo and labs, review of documented history and interval events in chart, face to face visit with discussion of findings and plan, care coordination and documentation.     Sincerely,    Karthik Balderas M.D.   of Pediatrics  Pediatric and Adult Congenital Cardiology  [facility]  Pediatric Cardiology Office [phone]  Adult Congenital Cardiology Triage and Scheduling [phone]      CC:  Xavi Morris [place]    Answers for HPI/ROS submitted by the patient on 11/24/2022  General Symptoms: No  Skin Symptoms: No  HENT Symptoms: No  EYE SYMPTOMS: No  HEART SYMPTOMS: Yes  LUNG SYMPTOMS: No  INTESTINAL SYMPTOMS: No  URINARY SYMPTOMS: No  REPRODUCTIVE SYMPTOMS: No  SKELETAL SYMPTOMS: No  BLOOD SYMPTOMS: No  NERVOUS SYSTEM SYMPTOMS: No  MENTAL HEALTH SYMPTOMS: No  Pain in legs with walking: No  Trouble breathing while lying down: No  Fingers or toes appear blue: No  High blood pressure: No  Low blood pressure: No  Fainting: No  Murmurs: No  Pacemaker: No  Varicose veins: No  Edema or swelling: No  Wake up at night with shortness of breath: No  Exercise intolerance: Yes

## 2022-11-25 NOTE — PROGRESS NOTES
[facility]    Assessment/Recommendations   Assessment/Plan:    Mr. 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  He is here for management of recurrent AFL.  Current cardiac medications include: Eliquis, Toprol XL, Diltiazem, and ASA.    He has an organized AFL on EKG on 11/11/22  and Zio. We dicussed in detail the therapeutic options including AAT and ablation therapy. Using AAT in this situation might exacerbate sinus node dysfunction as he is reporting flushing sensation sometimes after termination. Beside this rhythm is organized and amenable to ablation. We discussed the procedure in detail with the risks including vascular complications, bleeding, pericardial effusion and tamponade, phrenic nerve injury and CVA. The patient expressed his understanding and agreement to proceed with ablation. We will get a congenital cardiac CT for both structure and ablation planning. We will have him hold BB and and Diltiazem 2 days before. We will have him continue Eliquis.       History of Present Illness/Subjective    Mr. Xavi Mtz is a 32 year old male who comes in today for EP consultation for atrial flutter..    Mr. 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 He underwent a 33 mm prosthetic tricuspid valve placement in 1999 at [facility]  followed by balloon valvuloplasty in 2017. He has developed an increasing tricuspid valve gradient over the past 2 years from 6-15 mmHg along with decreased exercise tolerance.He then underwent a 29mm Sloan 3 ranscatheter valve on 12/6/2019 at [place]. 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 Of note he had not had prior documented atrial flutter.  He does have a long history of paroxysmal tachycardia, which was not seen at this electrophysiology study.    He presents today to [facility] care. He presented to ER on 11/11/22 with AFL. He was given IV metoprolol and reportedly converted to sinus. During visit to ED, had palpitations and a few second rush feeling after termination. This one was longer and the flushing sensation after termination is new over th last 2 month. He has had palpitations for about 5 years and empirical ablation of the CTI before valve replacement in 2019 did not fix the palpitations A zio patch monitor from 9/29/22-10/6/22 showed 2% AFL burden up to 3 hours 34 minutes with 8 symptom activations showing AFL and sinus. TTE done today showed a percutaneous 25mm balloon valvuloplasty of the tricuspid valve (7/27/17) and now after 29mm Sloan 3 ranscatheter valve on 12/6/2019, tricuspid valve mean gradient 6 mmHg. No signficant tricuspid valve insufficiency. There is moderate right ventricular enlargement with low normal systolic function. There is ventricular septal bouncing. Qualitatively low normal left ventricular systolic function. There is flow reversal in the hepatic veins during atrial systole. Presenting 12 lead ECG shows NSR with RBBB Vent Rate 71 bpm,  ms,  ms, QTc 493 ms. Current cardiac medications include: Eliquis, Toprol XL, Diltiazem, and ASA. Of note, warfarin was stopped about 5 months after surgery because of labile INRs, ELiquis was started in ED.        Cardiographics (Personally Reviewed) :   TTE 11/23/2022:  Patient with history of Ebstein's Anomaly, after 33 mm tricuspid valve replacement. Percutaneous 25mm balloon valvuloplasty of the tricuspid valve (7/27/17). Now after 29mm Sloan 3 ranscatheter valve on 12/6/2019 at [place]. Tricuspid valve mean gradient 6 mmHg. No signficant tricuspid valve insufficiency. There is moderate right ventricular enlargement with low normal systolic function. There is ventricular septal bouncing. Qualitatively low normal left ventricular systolic function. There is flow reversal in the hepatic veins during atrial systole.    1/20/20 Echo:   ------CONCLUSIONS------  Patient with history of Ebstein's Anomaly, after 33 mm tricuspid valve replacement. Percutaneous 25mm balloon valvuloplasty of the tricuspid valve (7/27/17). Now after 29mm Sloan 3 ranscatheter valve on 12/6/2019 at [place]. Tricuspid valve mean gradient 6 mmHg. Trivial tricuspid valve insufficiency. 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 There is moderate right ventricular enlargement with low normal systolic function. There is mildly decreased left ventricular systolic function. The calculated biplane left ventricular ejection fraction is 50 %. The inferior vena cava is dilated, with a diameter of 2.4 cm. There is flow reversal in the hepatic veins during atrial systole.    2017 Stress Test:   The patient exercised for 8 minutes and 20 seconds.    The patient exercised into Stage 3 of the Standard Lv Protocol, achieving a heart rate of 151 bpm in sinus rhythm.   Blood pressure ish appropriately for each stage of exercise.  Heart rate was suboptimal with peak rate of 151 at peak exercise (79% predicted).  Baseline heart rate was 75 bpm and ish to 151 bpm with baseline blood pressure of 112/68 mmHg and ish to a max blood pressure of 154/76 mmHg at the end of stage 2. At the end of stage 3, blood pressure was  150/70 mmHg and dropped to 92/40 47 seconds into recovery  There were no arrhythmias.  There were no ST-T changes.  Exercise was stopped due to lightheadedness.  The patient recovered uneventfully from exercise.VO2 was 28.7 ml/kg/min (predicted 39.5), RER 1.02, VO2/heart rate was 17 mm/beat, predicted 18, but stroke volume curve is blunted suggesting limited cardiac output, VE/VCO2 slope was 45 (predicted 29)    2017 Heart Cath:  Low CI at 1.92 L/min/m2, TV area decreased 0.64 cm2, indexed valve area 0.31cm2/m2, mean gradient across TV 11 mmHg despite low CO. Decreased SBP to mid 60s, but improvement with dopamine and albumin infusion.    Balloon valvuloplasty with Tyshak II 25 mm x 5 cm balloon and Z Med II 25 mm x 4 cm balloons.  CI improved to 3.51 L/min/m2, TV area significantly improved 1.8 cm2 (indexed valve area 0.87 cm2/m2), mean gradient improved to 5.3 mmHg. SBPs were maintained in 90s after discontinuation of Dopamine.        Physical Examination   There were no vitals taken for this visit.  Wt Readings from Last 3 Encounters:   11/11/22 99.8 kg (220 lb)   01/20/20 87.8 kg (193 lb 9 oz)   01/09/20 90.7 kg (200 lb)     General Appearance:   Alert, well-appearing and in no acute distress.   HEENT: Atraumatic, normocephalic. PERRL.  MMM.   Chest/Lungs:   Respirations unlabored.  Lungs are clear to auscultation.   Cardiovascular:   Regular rate and rhythm.  S1/S2. No murmur.    Abdomen:  Soft, nontender, nondistended.   Extremities: No cyanosis or clubbing. No edema..    Musculoskeletal: Moves all extremities.  Gait normal.   Skin: Warm, dry, intact.    Neurologic: Mood and affect are appropriate.  Alert and oriented to person, place, time, and situation.          Medications  Allergies   Current Outpatient Medications   Medication Sig Dispense Refill     amoxicillin (AMOXIL) 500 MG capsule Take 4 capsules (2,000 mg) by mouth once as needed (one hour prior to dental procedures.) (Patient not taking: Reported  on 8/3/2020) 4 capsule 3     apixaban ANTICOAGULANT (ELIQUIS) 5 MG tablet 5 mg twice a day for 60 days. 60 tablet 1     aspirin (ASA) 81 MG tablet Take 1 tablet (81 mg) by mouth daily 90 tablet 3     diltiazem ER COATED BEADS (CARDIZEM CD/CARTIA XT) 180 MG 24 hr capsule Take 1 capsule (180 mg) by mouth every morning (before breakfast) 90 capsule 3     metoprolol succinate ER (TOPROL XL) 50 MG 24 hr tablet Take 1 tablet (50 mg) by mouth daily for 30 days 30 tablet 0     warfarin ANTICOAGULANT (COUMADIN) 5 MG tablet Take 10mg(2 tabs) on Wed.,7.5mg(1 and 1/2 tabs) all other days or as directed.      Allergies   Allergen Reactions     Ceclor [Cefaclor]      Mouth ulcers         Lab Results (Personally Reviewed)    Chemistry/lipid CBC Cardiac Enzymes/BNP/TSH/INR   Lab Results   Component Value Date    BUN 11.5 11/11/2022     11/11/2022    CO2 26 11/11/2022     Creatinine   Date Value Ref Range Status   11/11/2022 0.89 0.67 - 1.17 mg/dL Final       No results found for: CHOL, HDL, LDL, CHOLHDL   Lab Results   Component Value Date    WBC 9.2 11/11/2022    HGB 15.8 11/11/2022    HCT 44.7 11/11/2022    MCV 87 11/11/2022     11/11/2022    Lab Results   Component Value Date    TSH 2.09 11/11/2022    INR 3.28 (H) 01/09/2020        The patient states understanding and is agreeable with the plan.   Ventura Engle MD [facility]  Cardiology - Electrophysiology        Total time spent on patient visit, reviewing notes, imaging, labs, orders, and completing necessary documentation: 60 minutes.

## 2022-11-25 NOTE — PATIENT INSTRUCTIONS
You were seen today in the Adult Congenital and Cardiovascular Genetics Clinic at the [facility].    Cardiology Providers you saw during your visit:  Karthik Balderas MD and Ventura Engle MD    Diagnosis: Ebstein's    Results:  Karthik Balderas MD and Ventura Engle MD reviewed the results of your EKG, echo, labs and 7 day zio testing today in clinic.    Recommendations for you:    Will need a CT prior to an a flutter ablation       General Cardiac Recommendations:  Continue to eat a heart healthy, low salt diet.  Continue to get 20-30 minutes of aerobic activity, 4-5 days per week.  Examples of aerobic activity include walking, running, swimming, cycling, etc.  Continue to observe good oral hygiene, with regular dental visits.      SBE prophylaxis:   Yes__X__  No____    If YES is checked, follow the recommendations outlined below:  Take antibiotic(s) prior to recommended dental procedures and procedures on the respiratory tract or with infected skin, muscle or bones. SBE prophylaxis is not needed for routine GI and  procedures (ie. Colonoscopy or vaginal delivery)  Observe good oral hygiene daily, as advised by your dentist. Get regular professional dental care.  Keep cuts clean.  Infections should be treated promptly.  Symptoms of Infective Endocarditis could include: fever lasting more than 4-5 days or a recurrent fever that initially resolves but returns within 1-2 days)      Exercise restrictions:   Yes__X__  No____         If yes, list restrictions:  Must be allowed to rest if fatigued or SOB      Work restrictions:  Yes____  No_X___         If yes, list restrictions:    FASTING CHOLESTEROL was checked in the last 5 years YES_X__  NO___ (2022)  If no, please follow up with your primary care physician. You should have a cholesterol screening every 5 years.      Follow-up: Follow up with Dr. Engle and Dr. Balderas 3 months after a flutter ablation.     If you have questions or concerns please contact us  at:    Emily Morillo, MPH, RN, BSN   Madeline Jimenez (Scheduling)  Nurse Care Coordinator     Clinic   Adult Congenital and CV Genetics   Adult Congenital and CV Genetic  [facility]   [facility]  (P) [phone]     (P) [phone]  [email or web address]  (F) [phone]        For after hours urgent needs, call [phone] and ask to speak to the Adult Congenital Physician on call.  Mention Job Code 0401.    For emergencies call 911.    [facility]  Mail Code [id number]  [address] [place] [address]        You are scheduled for an Atrial Flutter Ablation, at [facility], [place], with Dr. Ventura Engle. The hospital is located at [address] [place] [address] on the [place] of the [place].  If you need to cancel this procedure, please call [phone].       You will need to have a covid swab done prior to procedure.          - At-home, rapid antigen test:              - Perform within 1-2 days of procedure              - If negative, take a photo of the result or report the test result on the day of procedure               - If positive, contact Maisha Tellez at [phone]              - Where to find: For purchase at pharmacies, or you can order free tests at covid.gov/tests           - PCR (if you can't find or do a rapid antigen test, OR if you are staying overnight):              - Perform within 4 days of procedure                             - If being performed at an outside lab, result needs to be faxed to [phone].     Visitor Policy: Two visitors.    Date:______  Time: _______________To the Gold Waiting Room at the [facility]  Atrial Flutter Ablation    1. Your history and physical will be completed by our nurse practitioner when you arrive.  2. Please do not eat anything for 8 hours prior to your  procedure. You may have sips of water up until 2 hours prior to your arrival.  3. The morning of your procedure, you may take your scheduled medications with a sip of water - continue your blood thinner (warfarin, Pradaxa, Eliquis, Xarelto).  4. You may discharge the same day. You will need a .    Post-Procedure Instructions  Care of groin site:   Remove the Band-Aid after 24 hours. If there is minor oozing, apply another Band-aid and remove it after 12 hours.    Do NOT take a bath, use a hot tub, pool, or submerse in water for at least 3 days. You may shower.    It is normal to have a small bruise or lump at the site.   Do not scrub the site.   Do not use lotion or powder near the puncture site for 3 days.    If you start bleeding from the site in your groin: Lie down flat and press firmly on the site. Call your physician immediately, or, come to the emergency room.  Call 911 right away if you have bleeding that is heavy or does not stop.    Call your doctor/provider if:    You have a large or growing hard lump around the site.    The site is red, swollen, hot or tender.    You have chills or a fever greater than 101 F (38 C).    Blood or fluid is draining from the site.    Your leg or arm turns bluish, feels numb or cool.    You have hives, a rash or unusual itching.    Activity Restrictions   For the first 2 days: Do not stoop or squat. When you cough, sneeze or move your bowels, hold your hand over the puncture site and press gently.   Do not lift more than 10 pounds or exertional activity for 10 days.  - No driving for 24 hours after (with or without general anesthesia).     Maisha TY Procedure   [phone]

## 2022-11-25 NOTE — NURSING NOTE
Chief Complaint   Patient presents with     New Patient      ACHD 11/25/2022: 32 year old male with history of Ebstein's anomaly presenting for evaluation.     Vitals were taken, medications reconciled, and EKG was performed.    Matheus Perkins EMT  1:25 PM

## 2022-11-25 NOTE — Clinical Note
11/25/2022      RE: Xavi Mtz  [address] [facility] [address]       Dear Colleague,    Thank you for the opportunity to participate in the care of your patient, Xavi Mtz, at the [facility] at [facility]. Please see a copy of my visit note below.    No notes on file    Please do not hesitate to contact me if you have any questions/concerns.     Sincerely,     Karthik Balderas MD

## 2022-11-25 NOTE — PATIENT INSTRUCTIONS
You were seen today in the Adult Congenital and Cardiovascular Genetics Clinic at the [facility].    Cardiology Providers you saw during your visit:  Karthik Balderas MD and Ventura Engle MD    Diagnosis: Ebstein's    Results:  Karthik Balderas MD and Ventura Engle MD reviewed the results of your EKG, echo, labs and 7 day zio testing today in clinic.    Recommendations for you:    Will need a CT prior to an a flutter ablation       General Cardiac Recommendations:  Continue to eat a heart healthy, low salt diet.  Continue to get 20-30 minutes of aerobic activity, 4-5 days per week.  Examples of aerobic activity include walking, running, swimming, cycling, etc.  Continue to observe good oral hygiene, with regular dental visits.      SBE prophylaxis:   Yes__X__  No____    If YES is checked, follow the recommendations outlined below:  Take antibiotic(s) prior to recommended dental procedures and procedures on the respiratory tract or with infected skin, muscle or bones. SBE prophylaxis is not needed for routine GI and  procedures (ie. Colonoscopy or vaginal delivery)  Observe good oral hygiene daily, as advised by your dentist. Get regular professional dental care.  Keep cuts clean.  Infections should be treated promptly.  Symptoms of Infective Endocarditis could include: fever lasting more than 4-5 days or a recurrent fever that initially resolves but returns within 1-2 days)      Exercise restrictions:   Yes__X__  No____         If yes, list restrictions:  Must be allowed to rest if fatigued or SOB      Work restrictions:  Yes____  No_X___         If yes, list restrictions:    FASTING CHOLESTEROL was checked in the last 5 years YES_X__  NO___ (2022)  If no, please follow up with your primary care physician. You should have a cholesterol screening every 5 years.      Follow-up: Follow up with Dr. Engle and Dr. Balderas 3 months after a flutter ablation.     If you have questions or concerns please contact us  at:    Emily Morillo, MPH, RN, BSN   Madeline Jimenez (Scheduling)  Nurse Care Coordinator     Clinic   Adult Congenital and CV Genetics   Adult Congenital and CV Genetic  [facility]   [facility]  (P) [phone]     (P) [phone]  [email or web address]  (F) [phone]        For after hours urgent needs, call [phone] and ask to speak to the Adult Congenital Physician on call.  Mention Job Code 0401.    For emergencies call 911.    [facility]  Mail Code [id number]  [address] [place] [address]        You are scheduled for an Atrial Flutter Ablation, at [facility], [place], with Dr. Ventura Engle. The hospital is located at [address] [place] [address] on [place] the [place].  If you need to cancel this procedure, please call [phone].       You will need to have a covid swab done prior to procedure.          - At-home, rapid antigen test:              - Perform within 1-2 days of procedure              - If negative, take a photo of the result or report the test result on the day of procedure               - If positive, contact Maisha Tellez at [phone]              - Where to find: For purchase at pharmacies, or you can order free tests at covid.gov/tests           - PCR (if you can't find or do a rapid antigen test, OR if you are staying overnight):              - Perform within 4 days of procedure                             - If being performed at an outside lab, result needs to be faxed to [phone].     Visitor Policy: Two visitors.    Date:______  Time: _______________To the Gold Waiting Room at the [facility]  Atrial Flutter Ablation    1. Your history and physical will be completed by our nurse practitioner when you arrive.  2. Please do not eat anything for 8 hours prior to your  procedure. You may have sips of water up until 2 hours prior to your arrival.  3. The morning of your procedure, you may take your scheduled medications with a sip of water - continue your blood thinner (warfarin, Pradaxa, Eliquis, Xarelto).  4. You may discharge the same day. You will need a .    Post-Procedure Instructions  Care of groin site:   Remove the Band-Aid after 24 hours. If there is minor oozing, apply another Band-aid and remove it after 12 hours.    Do NOT take a bath, use a hot tub, pool, or submerse in water for at least 3 days. You may shower.    It is normal to have a small bruise or lump at the site.   Do not scrub the site.   Do not use lotion or powder near the puncture site for 3 days.    If you start bleeding from the site in your groin: Lie down flat and press firmly on the site. Call your physician immediately, or, come to the emergency room.  Call 911 right away if you have bleeding that is heavy or does not stop.    Call your doctor/provider if:    You have a large or growing hard lump around the site.    The site is red, swollen, hot or tender.    You have chills or a fever greater than 101 F (38 C).    Blood or fluid is draining from the site.    Your leg or arm turns bluish, feels numb or cool.    You have hives, a rash or unusual itching.    Activity Restrictions   For the first 2 days: Do not stoop or squat. When you cough, sneeze or move your bowels, hold your hand over the puncture site and press gently.   Do not lift more than 10 pounds or exertional activity for 10 days.  - No driving for 24 hours after (with or without general anesthesia).     Maisha TY Procedure   [phone]

## 2022-11-25 NOTE — LETTER
11/25/2022      RE: Xavi Mtz  [address] [facility] [address]       Dear Colleague,    Thank you for the opportunity to participate in the care of your patient, Xavi Mtz, at the [facility] HEART CLINIC [place] [facility]. Please see a copy of my visit note below.        ACHD ELECTROPHYSIOLOGY CLINIC VISIT    Assessment/Recommendations   Assessment/Plan:    Mr. 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  He is here for management of recurrent AFL.  Current cardiac medications include: Eliquis, Toprol XL, Diltiazem, and ASA.    He has an organized AFL on EKG on 11/11/22  and Zio. We dicussed in detail the therapeutic options including AAT and ablation therapy. Using AAT in this situation might exacerbate sinus node dysfunction as he is reporting flushing sensation sometimes after termination. Beside this rhythm is organized and amenable to ablation. We discussed the procedure in detail with the risks including vascular complications, bleeding, pericardial effusion and tamponade, phrenic nerve injury and CVA. The patient expressed his understanding and agreement to proceed with ablation. We will get a congenital cardiac CT for both structure and ablation planning. We will have him hold BB and and Diltiazem 2 days before. We will have him continue Eliquis.       History of Present Illness/Subjective    Mr. Xavi Mtz is a 32 year old male who comes in today for EP consultation for atrial flutter..    Mr. 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 He underwent a 33 mm prosthetic tricuspid valve placement in 1999 at [facility] followed by balloon valvuloplasty in 2017. He has developed an increasing tricuspid valve gradient over the past 2 years from 6-15 mmHg along with decreased exercise tolerance.He then underwent a 29mm Sloan 3 ranscatheter valve on 12/6/2019 at [facility]. 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 Of note he had not had prior documented atrial flutter.  He does have a long history of paroxysmal tachycardia, which was not seen at this electrophysiology study.    He presents today to establish care. He presented to ER on 11/11/22 with AFL. He was given IV metoprolol and reportedly converted to sinus. During visit to ED, had palpitations and a few second rush feeling after termination. This one was longer and the flushing sensation after termination is new over th last 2 month. He has had palpitations for about 5 years and empirical ablation of the CTI before valve replacement in 2019 did not fix the palpitations A zio patch monitor from 9/29/22-10/6/22 showed 2% AFL burden up to 3 hours 34 minutes with 8 symptom activations showing AFL and sinus. TTE done today showed a percutaneous 25mm balloon valvuloplasty of the tricuspid valve (7/27/17) and now after 29mm Sloan 3 ranscatheter valve on 12/6/2019, tricuspid valve mean gradient 6 mmHg. No signficant tricuspid valve insufficiency. There is moderate right ventricular enlargement with low normal systolic function. There is ventricular septal bouncing. Qualitatively low normal left ventricular systolic function. There is  flow reversal in the hepatic veins during atrial systole. Presenting 12 lead ECG shows NSR with RBBB Vent Rate 71 bpm,  ms,  ms, QTc 493 ms. Current cardiac medications include: Eliquis, Toprol XL, Diltiazem, and ASA. Of note, warfarin was stopped about 5 months after surgery because of labile INRs, ELiquis was started in ED.       Cardiographics (Personally Reviewed) :   TTE 11/23/2022:  Patient with history of Ebstein's Anomaly, after 33 mm tricuspid valve replacement. Percutaneous 25mm balloon valvuloplasty of the tricuspid valve (7/27/17). Now after 29mm Sloan 3 ranscatheter valve on 12/6/2019 at [facility]. Tricuspid valve mean gradient 6 mmHg. No signficant tricuspid valve insufficiency. There is moderate right ventricular enlargement with low normal systolic function. There is ventricular septal bouncing. Qualitatively low normal left ventricular systolic function. There is flow reversal in the hepatic veins during atrial systole.    1/20/20 Echo:   ------CONCLUSIONS------  Patient with history of Ebstein's Anomaly, after 33 mm tricuspid valve replacement. Percutaneous 25mm balloon valvuloplasty of the tricuspid valve (7/27/17). Now after 29mm Sloan 3 ranscatheter valve on 12/6/2019 at [facility]. Tricuspid valve mean gradient 6 mmHg. Trivial tricuspid valve insufficiency. 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 There is moderate right ventricular enlargement with low normal systolic function. There is mildly decreased left ventricular systolic function. The calculated biplane left ventricular ejection fraction is 50 %. The inferior vena cava is dilated, with a diameter of 2.4 cm. There is flow reversal in the hepatic veins during atrial systole.    2017 Stress Test:   The patient exercised for 8 minutes and 20 seconds.    The patient exercised into Stage 3 of the Standard Lv Protocol, achieving a heart rate of 151 bpm in sinus rhythm.   Blood  pressure ish appropriately for each stage of exercise.  Heart rate was suboptimal with peak rate of 151 at peak exercise (79% predicted).  Baseline heart rate was 75 bpm and ish to 151 bpm with baseline blood pressure of 112/68 mmHg and ish to a max blood pressure of 154/76 mmHg at the end of stage 2. At the end of stage 3, blood pressure was 150/70 mmHg and dropped to 92/40 47 seconds into recovery  There were no arrhythmias.  There were no ST-T changes.  Exercise was stopped due to lightheadedness.  The patient recovered uneventfully from exercise.VO2 was 28.7 ml/kg/min (predicted 39.5), RER 1.02, VO2/heart rate was 17 mm/beat, predicted 18, but stroke volume curve is blunted suggesting limited cardiac output, VE/VCO2 slope was 45 (predicted 29)    2017 Heart Cath:  Low CI at 1.92 L/min/m2, TV area decreased 0.64 cm2, indexed valve area 0.31cm2/m2, mean gradient across TV 11 mmHg despite low CO. Decreased SBP to mid 60s, but improvement with dopamine and albumin infusion.    Balloon valvuloplasty with Tyshak II 25 mm x 5 cm balloon and Z Med II 25 mm x 4 cm balloons.  CI improved to 3.51 L/min/m2, TV area significantly improved 1.8 cm2 (indexed valve area 0.87 cm2/m2), mean gradient improved to 5.3 mmHg. SBPs were maintained in 90s after discontinuation of Dopamine.        Physical Examination   There were no vitals taken for this visit.  Wt Readings from Last 3 Encounters:   11/11/22 99.8 kg (220 lb)   01/20/20 87.8 kg (193 lb 9 oz)   01/09/20 90.7 kg (200 lb)     General Appearance:   Alert, well-appearing and in no acute distress.   HEENT: Atraumatic, normocephalic. PERRL.  MMM.   Chest/Lungs:   Respirations unlabored.  Lungs are clear to auscultation.   Cardiovascular:   Regular rate and rhythm.  S1/S2. No murmur.    Abdomen:  Soft, nontender, nondistended.   Extremities: No cyanosis or clubbing. No edema..    Musculoskeletal: Moves all extremities.  Gait normal.   Skin: Warm, dry, intact.    Neurologic:  Mood and affect are appropriate.  Alert and oriented to person, place, time, and situation.          Medications  Allergies   Current Outpatient Medications   Medication Sig Dispense Refill     amoxicillin (AMOXIL) 500 MG capsule Take 4 capsules (2,000 mg) by mouth once as needed (one hour prior to dental procedures.) (Patient not taking: Reported on 8/3/2020) 4 capsule 3     apixaban ANTICOAGULANT (ELIQUIS) 5 MG tablet 5 mg twice a day for 60 days. 60 tablet 1     aspirin (ASA) 81 MG tablet Take 1 tablet (81 mg) by mouth daily 90 tablet 3     diltiazem ER COATED BEADS (CARDIZEM CD/CARTIA XT) 180 MG 24 hr capsule Take 1 capsule (180 mg) by mouth every morning (before breakfast) 90 capsule 3     metoprolol succinate ER (TOPROL XL) 50 MG 24 hr tablet Take 1 tablet (50 mg) by mouth daily for 30 days 30 tablet 0     warfarin ANTICOAGULANT (COUMADIN) 5 MG tablet Take 10mg(2 tabs) on Wed.,7.5mg(1 and 1/2 tabs) all other days or as directed.      Allergies   Allergen Reactions     Ceclor [Cefaclor]      Mouth ulcers         Lab Results (Personally Reviewed)    Chemistry/lipid CBC Cardiac Enzymes/BNP/TSH/INR   Lab Results   Component Value Date    BUN 11.5 11/11/2022     11/11/2022    CO2 26 11/11/2022     Creatinine   Date Value Ref Range Status   11/11/2022 0.89 0.67 - 1.17 mg/dL Final       No results found for: CHOL, HDL, LDL, CHOLHDL   Lab Results   Component Value Date    WBC 9.2 11/11/2022    HGB 15.8 11/11/2022    HCT 44.7 11/11/2022    MCV 87 11/11/2022     11/11/2022    Lab Results   Component Value Date    TSH 2.09 11/11/2022    INR 3.28 (H) 01/09/2020        The patient states understanding and is agreeable with the plan.   Ventura Engle MD [facility]  Cardiology - Electrophysiology        Total time spent on patient visit, reviewing notes, imaging, labs, orders, and completing necessary documentation: 60 minutes.

## 2022-11-25 NOTE — LETTER
11/25/2022       RE: Xavi Mtz  [address] [facility] [address]     Dear Colleague,    Thank you for referring your patient, Xavi Mtz, to the [facility] HEART CLINIC at [facility]. Please see a copy of my visit note below.    Adult Congenital Cardiology Visit    Patient:  Xavi Mtz MRN:  [id number]   YOB: 1990 Age:  32 year old   Date of Visit:  Nov 25, 2022 PCP:  Hamzah Atkins MD     Dear Hamzah Savage MD:    I had the pleasure of seeing your patient Xavi Mtz at the [facility] Adult Congenital and Cardiovascular Genetics Clinic on Nov 25, 2022.   Xavi is a 31 yo male with Ebsteins anomaly       Past medical history:  The past medical history was reviewed with the patient and family today and updated.  ***.    Past Medical History:   Diagnosis Date     Congenital heart disease     Ebstein's Anomaly       Current Outpatient Medications   Medication Sig Dispense Refill     amoxicillin (AMOXIL) 500 MG capsule Take 4 capsules (2,000 mg) by mouth once as needed (one hour prior to dental procedures.) (Patient not taking: Reported on 8/3/2020) 4 capsule 3     apixaban ANTICOAGULANT (ELIQUIS) 5 MG tablet 5 mg twice a day for 60 days. 60 tablet 1     aspirin (ASA) 81 MG tablet Take 1 tablet (81 mg) by mouth daily 90 tablet 3     diltiazem ER COATED BEADS (CARDIZEM CD/CARTIA XT) 180 MG 24 hr capsule Take 1 capsule (180 mg) by mouth every morning (before breakfast) 90 capsule 3     metoprolol succinate ER (TOPROL XL) 50 MG 24 hr tablet Take 1 tablet (50 mg) by mouth daily for 30 days 30 tablet 0     warfarin ANTICOAGULANT (COUMADIN) 5 MG tablet Take 10mg(2 tabs) on Wed.,7.5mg(1 and 1/2 tabs) all other days or as directed.         Allergies   Allergen Reactions     Ceclor [Cefaclor]      Mouth ulcers         Family History:   Family History   Problem Relation Age of Onset     Diabetes Mother            Social history:    Social History     Occupational History     Not on file   Tobacco Use     Smoking status: Some Days     Packs/day: 0.50     Years: 10.00     Pack years: 5.00     Types: Cigarettes     Smokeless tobacco: Current     Types: Chew     Tobacco comments:      Smokes E-Cigs   Substance and Sexual Activity     Alcohol use: No     Alcohol/week: 0.0 standard drinks     Drug use: No     Sexual activity: Not on file       Marital Status:       Review of Systems: A comprehensive review of systems was performed and is negative, except as noted in the HPI and PMH  Review of Systems     Physical exam:  His vitals were not taken for this visit.   His body mass index is unknown because there is no height or weight on file.  His body surface area is unknown because there is no height or weight on file.    There is no central or peripheral cyanosis. Pupils are reactive and sclera are not jaundiced. There is no conjunctival injection or discharge. EOMI. Mucous membranes are moist and pink.   Lungs are clear to ausculation bilaterally with no wheezes, rales or rhonchi. There is no increased work of breathing, retractions or nasal flaring. Precordium is quiet with a normally placed apical impulse. On auscultation, heart sounds are regular with normal S1 and physiologically split S2. There are no murmurs, rubs or gallops.  Abdomen is soft and non-tender without masses or hepatomegaly. Femoral pulses are normal with no brachial femoral delay.Skin is without rashes, lesions, or significant bruising. Extremities are warm and well-perfused with no cyanosis, clubbing or edema. Peripheral pulses are normal and there is < 2 sec capillary refill. Patient is alert and oriented and moves all extremities equally with normal tone.         12 Lead EKG performed *** shows normal sinus rhythm at a rate of *** bpm with normal intervals and no chamber enlargement or hypertrophy.    An echocardiogram performed today is  notable for ***    Results for orders placed or performed in visit on 11/25/22   EKG 12-lead, tracing only (Same Day)     Status: None (Preliminary result)   Result Value Ref Range    Systolic Blood Pressure  mmHg    Diastolic Blood Pressure  mmHg    Ventricular Rate 71 BPM    Atrial Rate 71 BPM    OH Interval 208 ms    QRS Duration 164 ms     ms    QTc 493 ms    P Axis 17 degrees    R AXIS 64 degrees    T Axis 21 degrees    Interpretation ECG       Sinus rhythm  Right bundle branch block  Cannot rule out Inferior infarct , age undetermined  Abnormal ECG  When compared with ECG of 20-JAN-2020 09:35,  Minimal criteria for Inferior infarct are now Present     Results for orders placed or performed in visit on 11/25/22   Lipid panel reflex to direct LDL Fasting     Status: Abnormal   Result Value Ref Range    Cholesterol 170 <200 mg/dL    Triglycerides 267 (H) <150 mg/dL    Direct Measure HDL 31 (L) >=40 mg/dL    LDL Cholesterol Calculated 86 <=100 mg/dL    Non HDL Cholesterol 139 (H) <130 mg/dL    Narrative    Cholesterol  Desirable:  <200 mg/dL    Triglycerides  Normal:  Less than 150 mg/dL  Borderline High:  150-199 mg/dL  High:  200-499 mg/dL  Very High:  Greater than or equal to 500 mg/dL    Direct Measure HDL  Female:  Greater than or equal to 50 mg/dL   Male:  Greater than or equal to 40 mg/dL    LDL Cholesterol  Desirable:  <100mg/dL  Above Desirable:  100-129 mg/dL   Borderline High:  130-159 mg/dL   High:  160-189 mg/dL   Very High:  >= 190 mg/dL    Non HDL Cholesterol  Desirable:  130 mg/dL  Above Desirable:  130-159 mg/dL  Borderline High:  160-189 mg/dL  High:  190-219 mg/dL  Very High:  Greater than or equal to 220 mg/dL   Comprehensive metabolic panel     Status: Normal   Result Value Ref Range    Sodium 139 136 - 145 mmol/L    Potassium 4.2 3.4 - 5.3 mmol/L    Chloride 103 98 - 107 mmol/L    Carbon Dioxide (CO2) 25 22 - 29 mmol/L    Anion Gap 11 7 - 15 mmol/L    Urea Nitrogen 11.2 6.0 - 20.0 mg/dL  "   Creatinine 0.88 0.67 - 1.17 mg/dL    Calcium 9.5 8.6 - 10.0 mg/dL    Glucose 93 70 - 99 mg/dL    Alkaline Phosphatase 78 40 - 129 U/L    AST 33 10 - 50 U/L    ALT 33 10 - 50 U/L    Protein Total 7.5 6.4 - 8.3 g/dL    Albumin 4.8 3.5 - 5.2 g/dL    Bilirubin Total 0.7 <=1.2 mg/dL    GFR Estimate >90 >60 mL/min/1.73m2   TSH with free T4 reflex     Status: Normal   Result Value Ref Range    TSH 1.31 0.30 - 4.20 uIU/mL   CBC with platelets and differential     Status: None   Result Value Ref Range    WBC Count 7.0 4.0 - 11.0 10e3/uL    RBC Count 5.29 4.40 - 5.90 10e6/uL    Hemoglobin 16.2 13.3 - 17.7 g/dL    Hematocrit 47.3 40.0 - 53.0 %    MCV 89 78 - 100 fL    MCH 30.6 26.5 - 33.0 pg    MCHC 34.2 31.5 - 36.5 g/dL    RDW 12.2 10.0 - 15.0 %    Platelet Count 253 150 - 450 10e3/uL    % Neutrophils 53 %    % Lymphocytes 37 %    % Monocytes 7 %    % Eosinophils 2 %    % Basophils 1 %    % Immature Granulocytes 0 %    NRBCs per 100 WBC 0 <1 /100    Absolute Neutrophils 3.7 1.6 - 8.3 10e3/uL    Absolute Lymphocytes 2.6 0.8 - 5.3 10e3/uL    Absolute Monocytes 0.5 0.0 - 1.3 10e3/uL    Absolute Eosinophils 0.2 0.0 - 0.7 10e3/uL    Absolute Basophils 0.1 0.0 - 0.2 10e3/uL    Absolute Immature Granulocytes 0.0 <=0.4 10e3/uL    Absolute NRBCs 0.0 10e3/uL   CBC with platelets differential     Status: None    Narrative    The following orders were created for panel order CBC with platelets differential.  Procedure                               Abnormality         Status                     ---------                               -----------         ------                     CBC with platelets and d...[[id number]]                      Final result                 Please view results for these tests on the individual orders.         In summary, Xavi is a 32 year old with ***    Thank you for the opportunity to participate in Xavi's care.  I {recommendations:[id number]::\"did not recommend any activity restrictions or endocarditis " "prophylaxis.\"}  I asked to see him back in *** with ***Please do not hesitate to call with questions or concerns.      Diagnoses:   1. ***  2.     Sincerely,    Karthik Balderas M.D.   of Pediatrics  Pediatric and Adult Congenital Cardiology  [facility]  Pediatric Cardiology Office [phone]  Adult Congenital Cardiology Triage and Scheduling [phone]      CC:  Xavi Morris Bibi        Again, thank you for allowing me to participate in the care of your patient.      Sincerely,    Karthik Balderas MD    "

## 2022-11-28 LAB
ATRIAL RATE - MUSE: 71 BPM
DIASTOLIC BLOOD PRESSURE - MUSE: NORMAL MMHG
INTERPRETATION ECG - MUSE: NORMAL
P AXIS - MUSE: 17 DEGREES
PR INTERVAL - MUSE: 208 MS
QRS DURATION - MUSE: 164 MS
QT - MUSE: 454 MS
QTC - MUSE: 493 MS
R AXIS - MUSE: 64 DEGREES
SYSTOLIC BLOOD PRESSURE - MUSE: NORMAL MMHG
T AXIS - MUSE: 21 DEGREES
VENTRICULAR RATE- MUSE: 71 BPM

## 2022-12-12 DIAGNOSIS — I47.10 SVT (SUPRAVENTRICULAR TACHYCARDIA) (H): ICD-10-CM

## 2022-12-12 DIAGNOSIS — Q22.5 EBSTEIN'S ANOMALY: Primary | ICD-10-CM

## 2022-12-12 NOTE — TELEPHONE ENCOUNTER
metoprolol succinate ER (TOPROL XL) 50 MG 24 hr tablet      Last Written Prescription Date:  11-11-22  End date 12-11-22  Last Fill Quantity: 30,   # refills: 0  Last Office Visit : 1-25-22  Future Office visit:  4-28-23    Routing refill request to provider for review/approval because:  Last fill at ER, other provider  Last rx 30 day, end date 12-11-22    Per pt call, took last pill today.

## 2022-12-12 NOTE — TELEPHONE ENCOUNTER
Health Call Center    Phone Message    May a detailed message be left on voicemail: yes     Reason for Call: Medication Refill Request    Has the patient contacted the pharmacy for the refill? Yes   Name of medication being requested: metoprolol succinate ER (TOPROL XL) 50 MG 24 hr tablet  Provider who prescribed the medication: Dr. Engle  Pharmacy:    [facility] [place] [id number] [facility] [address]    Date medication is needed: 12/13/22 pt took last tablet today thinking he had refill at pharmacy.     Action Taken: Other: Cardiology    Travel Screening: Not Applicable     Thank you!  [facility]

## 2022-12-15 ENCOUNTER — MYC MEDICAL ADVICE (OUTPATIENT)
Dept: CARDIOLOGY | Facility: CLINIC | Age: 32
End: 2022-12-15

## 2022-12-15 NOTE — TELEPHONE ENCOUNTER
Health Call Center    Phone Message    May a detailed message be left on voicemail: yes     Reason for Call: Medication Refill Request    Has the patient contacted the pharmacy for the refill? Yes   Name of medication being requested: Metoprolol  Provider who prescribed the medication: Dr. Engle  Pharmacy: [facility] [facility] [address] [facility] [address]  Date medication is needed: 12/15/2022     Pt is out of medication and is becoming symptomatic.  He is currently experiencing Tachycardia.  He declined speaking with triage      Action Taken: Message routed to:  Other: Cardiology    Travel Screening: Not Applicable

## 2022-12-16 RX ORDER — METOPROLOL SUCCINATE 50 MG/1
50 TABLET, EXTENDED RELEASE ORAL DAILY
Qty: 90 TABLET | Refills: 3 | Status: SHIPPED | OUTPATIENT
Start: 2022-12-16 | End: 2023-04-28

## 2023-01-17 NOTE — TELEPHONE ENCOUNTER
Called patient to discuss instructions and answer any questions. He asked appropriate questions that were all answered to his satisfaction.  Emily Morillo RN on 1/17/2023 at 10:25 AM

## 2023-01-23 ENCOUNTER — TELEPHONE (OUTPATIENT)
Dept: CARDIOLOGY | Facility: CLINIC | Age: 33
End: 2023-01-23
Payer: COMMERCIAL

## 2023-01-23 ENCOUNTER — HOSPITAL ENCOUNTER (OUTPATIENT)
Dept: CT IMAGING | Facility: CLINIC | Age: 33
Discharge: HOME | End: 2023-01-23
Attending: INTERNAL MEDICINE | Admitting: INTERNAL MEDICINE
Payer: COMMERCIAL

## 2023-01-23 DIAGNOSIS — Q22.5 EBSTEIN'S ANOMALY: ICD-10-CM

## 2023-01-23 PROCEDURE — 75572 CT HRT W/3D IMAGE: CPT

## 2023-01-23 PROCEDURE — 75572 CT HRT W/3D IMAGE: CPT | Mod: 26 | Performed by: INTERNAL MEDICINE

## 2023-01-23 PROCEDURE — 250N000011 HC RX IP 250 OP 636: Performed by: INTERNAL MEDICINE

## 2023-01-23 RX ORDER — IOPAMIDOL 755 MG/ML
120 INJECTION, SOLUTION INTRAVASCULAR ONCE
Status: COMPLETED | OUTPATIENT
Start: 2023-01-23 | End: 2023-01-23

## 2023-01-23 RX ADMIN — IOPAMIDOL 120 ML: 755 INJECTION, SOLUTION INTRAVENOUS at 11:27

## 2023-01-23 NOTE — TELEPHONE ENCOUNTER
Left voicemail to remind patient of Cardiac Cath Lab appointment on 1/24 and inform patient of updated Visitor Policy.

## 2023-01-24 ENCOUNTER — HOSPITAL ENCOUNTER (OUTPATIENT)
Facility: CLINIC | Age: 33
Discharge: HOME | End: 2023-01-24
Attending: INTERNAL MEDICINE | Admitting: INTERNAL MEDICINE
Payer: COMMERCIAL

## 2023-01-24 ENCOUNTER — APPOINTMENT (OUTPATIENT)
Dept: LAB | Facility: CLINIC | Age: 33
End: 2023-01-24
Attending: INTERNAL MEDICINE
Payer: COMMERCIAL

## 2023-01-24 ENCOUNTER — APPOINTMENT (OUTPATIENT)
Dept: MEDSURG UNIT | Facility: CLINIC | Age: 33
End: 2023-01-24
Attending: INTERNAL MEDICINE
Payer: COMMERCIAL

## 2023-01-24 VITALS
RESPIRATION RATE: 22 BRPM | HEIGHT: 70 IN | DIASTOLIC BLOOD PRESSURE: 71 MMHG | OXYGEN SATURATION: 96 % | SYSTOLIC BLOOD PRESSURE: 120 MMHG | HEART RATE: 89 BPM | BODY MASS INDEX: 30.56 KG/M2 | TEMPERATURE: 97.9 F | WEIGHT: 213.5 LBS

## 2023-01-24 DIAGNOSIS — Q22.5 EBSTEIN'S ANOMALY: ICD-10-CM

## 2023-01-24 DIAGNOSIS — I48.92 ATRIAL FLUTTER, UNSPECIFIED TYPE (H): ICD-10-CM

## 2023-01-24 DIAGNOSIS — I48.92 ATRIAL FLUTTER (H): ICD-10-CM

## 2023-01-24 LAB
ANION GAP SERPL CALCULATED.3IONS-SCNC: 12 MMOL/L (ref 7–15)
BUN SERPL-MCNC: 9.2 MG/DL (ref 6–20)
CALCIUM SERPL-MCNC: 9.2 MG/DL (ref 8.6–10)
CHLORIDE SERPL-SCNC: 107 MMOL/L (ref 98–107)
CREAT SERPL-MCNC: 0.85 MG/DL (ref 0.67–1.17)
DEPRECATED HCO3 PLAS-SCNC: 20 MMOL/L (ref 22–29)
ERYTHROCYTE [DISTWIDTH] IN BLOOD BY AUTOMATED COUNT: 12.6 % (ref 10–15)
GFR SERPL CREATININE-BSD FRML MDRD: >90 ML/MIN/1.73M2
GLUCOSE SERPL-MCNC: 104 MG/DL (ref 70–99)
HCT VFR BLD AUTO: 46 % (ref 40–53)
HGB BLD-MCNC: 15.6 G/DL (ref 13.3–17.7)
MCH RBC QN AUTO: 30.1 PG (ref 26.5–33)
MCHC RBC AUTO-ENTMCNC: 33.9 G/DL (ref 31.5–36.5)
MCV RBC AUTO: 89 FL (ref 78–100)
PLATELET # BLD AUTO: 243 10E3/UL (ref 150–450)
POTASSIUM SERPL-SCNC: 4.5 MMOL/L (ref 3.4–5.3)
RBC # BLD AUTO: 5.18 10E6/UL (ref 4.4–5.9)
SODIUM SERPL-SCNC: 139 MMOL/L (ref 136–145)
WBC # BLD AUTO: 6.5 10E3/UL (ref 4–11)

## 2023-01-24 PROCEDURE — 258N000003 HC RX IP 258 OP 636: Performed by: INTERNAL MEDICINE

## 2023-01-24 PROCEDURE — 93653 COMPRE EP EVAL TX SVT: CPT | Performed by: INTERNAL MEDICINE

## 2023-01-24 PROCEDURE — C1732 CATH, EP, DIAG/ABL, 3D/VECT: HCPCS | Performed by: INTERNAL MEDICINE

## 2023-01-24 PROCEDURE — 999N000054 HC STATISTIC EKG NON-CHARGEABLE

## 2023-01-24 PROCEDURE — 93005 ELECTROCARDIOGRAM TRACING: CPT

## 2023-01-24 PROCEDURE — C1766 INTRO/SHEATH,STRBLE,NON-PEEL: HCPCS | Performed by: INTERNAL MEDICINE

## 2023-01-24 PROCEDURE — 250N000011 HC RX IP 250 OP 636: Performed by: INTERNAL MEDICINE

## 2023-01-24 PROCEDURE — 999N000134 HC STATISTIC PP CARE STAGE 3

## 2023-01-24 PROCEDURE — 99152 MOD SED SAME PHYS/QHP 5/>YRS: CPT | Performed by: INTERNAL MEDICINE

## 2023-01-24 PROCEDURE — 93655 ICAR CATH ABLTJ DSCRT ARRHYT: CPT | Performed by: INTERNAL MEDICINE

## 2023-01-24 PROCEDURE — 93010 ELECTROCARDIOGRAM REPORT: CPT | Mod: 76 | Performed by: INTERNAL MEDICINE

## 2023-01-24 PROCEDURE — 85027 COMPLETE CBC AUTOMATED: CPT | Performed by: INTERNAL MEDICINE

## 2023-01-24 PROCEDURE — C1731 CATH, EP, 20 OR MORE ELEC: HCPCS | Performed by: INTERNAL MEDICINE

## 2023-01-24 PROCEDURE — C1730 CATH, EP, 19 OR FEW ELECT: HCPCS | Performed by: INTERNAL MEDICINE

## 2023-01-24 PROCEDURE — C1733 CATH, EP, OTHR THAN COOL-TIP: HCPCS | Performed by: INTERNAL MEDICINE

## 2023-01-24 PROCEDURE — 80048 BASIC METABOLIC PNL TOTAL CA: CPT | Performed by: INTERNAL MEDICINE

## 2023-01-24 PROCEDURE — 250N000009 HC RX 250: Performed by: INTERNAL MEDICINE

## 2023-01-24 PROCEDURE — 272N000001 HC OR GENERAL SUPPLY STERILE: Performed by: INTERNAL MEDICINE

## 2023-01-24 PROCEDURE — 999N000128 HC STATISTIC PERIPHERAL IV START W/O US GUIDANCE

## 2023-01-24 PROCEDURE — 93623 PRGRMD STIMJ&PACG IV RX NFS: CPT | Mod: 26 | Performed by: INTERNAL MEDICINE

## 2023-01-24 PROCEDURE — 999N000142 HC STATISTIC PROCEDURE PREP ONLY

## 2023-01-24 PROCEDURE — 36415 COLL VENOUS BLD VENIPUNCTURE: CPT | Performed by: INTERNAL MEDICINE

## 2023-01-24 PROCEDURE — C1894 INTRO/SHEATH, NON-LASER: HCPCS | Performed by: INTERNAL MEDICINE

## 2023-01-24 PROCEDURE — 250N000011 HC RX IP 250 OP 636: Performed by: NURSE PRACTITIONER

## 2023-01-24 PROCEDURE — 99153 MOD SED SAME PHYS/QHP EA: CPT | Performed by: INTERNAL MEDICINE

## 2023-01-24 PROCEDURE — 93623 PRGRMD STIMJ&PACG IV RX NFS: CPT | Performed by: INTERNAL MEDICINE

## 2023-01-24 PROCEDURE — 250N000013 HC RX MED GY IP 250 OP 250 PS 637: Performed by: INTERNAL MEDICINE

## 2023-01-24 RX ORDER — CLINDAMYCIN PHOSPHATE 900 MG/50ML
900 INJECTION, SOLUTION INTRAVENOUS EVERY 8 HOURS
Status: DISCONTINUED | OUTPATIENT
Start: 2023-01-24 | End: 2023-01-24

## 2023-01-24 RX ORDER — LIDOCAINE 40 MG/G
CREAM TOPICAL
Status: DISCONTINUED | OUTPATIENT
Start: 2023-01-24 | End: 2023-01-24 | Stop reason: HOSPADM

## 2023-01-24 RX ORDER — MULTIPLE VITAMINS W/ MINERALS TAB 9MG-400MCG
1 TAB ORAL DAILY
COMMUNITY
End: 2023-12-22

## 2023-01-24 RX ORDER — NALOXONE HYDROCHLORIDE 0.4 MG/ML
0.4 INJECTION, SOLUTION INTRAMUSCULAR; INTRAVENOUS; SUBCUTANEOUS
Status: DISCONTINUED | OUTPATIENT
Start: 2023-01-24 | End: 2023-01-24 | Stop reason: HOSPADM

## 2023-01-24 RX ORDER — NALOXONE HYDROCHLORIDE 0.4 MG/ML
0.2 INJECTION, SOLUTION INTRAMUSCULAR; INTRAVENOUS; SUBCUTANEOUS
Status: DISCONTINUED | OUTPATIENT
Start: 2023-01-24 | End: 2023-01-24 | Stop reason: HOSPADM

## 2023-01-24 RX ORDER — MIDAZOLAM HCL IN 0.9 % NACL/PF 1 MG/ML
.5-6 PLASTIC BAG, INJECTION (ML) INTRAVENOUS CONTINUOUS PRN
Status: DISCONTINUED | OUTPATIENT
Start: 2023-01-24 | End: 2023-01-24 | Stop reason: HOSPADM

## 2023-01-24 RX ORDER — LORAZEPAM 2 MG/ML
1 INJECTION INTRAMUSCULAR ONCE
Status: COMPLETED | OUTPATIENT
Start: 2023-01-24 | End: 2023-01-24

## 2023-01-24 RX ORDER — OXYCODONE AND ACETAMINOPHEN 5; 325 MG/1; MG/1
1 TABLET ORAL EVERY 4 HOURS PRN
Status: DISCONTINUED | OUTPATIENT
Start: 2023-01-24 | End: 2023-01-24 | Stop reason: HOSPADM

## 2023-01-24 RX ORDER — SODIUM CHLORIDE 9 MG/ML
INJECTION, SOLUTION INTRAVENOUS CONTINUOUS
Status: DISCONTINUED | OUTPATIENT
Start: 2023-01-24 | End: 2023-01-24 | Stop reason: HOSPADM

## 2023-01-24 RX ORDER — FENTANYL CITRATE 50 UG/ML
INJECTION, SOLUTION INTRAMUSCULAR; INTRAVENOUS
Status: DISCONTINUED | OUTPATIENT
Start: 2023-01-24 | End: 2023-01-24 | Stop reason: HOSPADM

## 2023-01-24 RX ORDER — DILTIAZEM HYDROCHLORIDE 180 MG/1
180 CAPSULE, COATED, EXTENDED RELEASE ORAL
Status: DISCONTINUED | OUTPATIENT
Start: 2023-01-25 | End: 2023-01-24 | Stop reason: HOSPADM

## 2023-01-24 RX ORDER — DIPHENHYDRAMINE HCL 25 MG
50 CAPSULE ORAL EVERY 6 HOURS PRN
Status: DISCONTINUED | OUTPATIENT
Start: 2023-01-24 | End: 2023-01-24 | Stop reason: HOSPADM

## 2023-01-24 RX ORDER — METOPROLOL SUCCINATE 50 MG/1
50 TABLET, EXTENDED RELEASE ORAL DAILY
Status: DISCONTINUED | OUTPATIENT
Start: 2023-01-24 | End: 2023-01-24 | Stop reason: HOSPADM

## 2023-01-24 RX ADMIN — DIPHENHYDRAMINE HYDROCHLORIDE 50 MG: 25 CAPSULE ORAL at 16:14

## 2023-01-24 RX ADMIN — CLINDAMYCIN PHOSPHATE 900 MG: 900 INJECTION, SOLUTION INTRAVENOUS at 11:52

## 2023-01-24 RX ADMIN — LORAZEPAM 1 MG: 2 INJECTION INTRAMUSCULAR; INTRAVENOUS at 10:47

## 2023-01-24 RX ADMIN — SODIUM CHLORIDE: 9 INJECTION, SOLUTION INTRAVENOUS at 10:20

## 2023-01-24 ASSESSMENT — ACTIVITIES OF DAILY LIVING (ADL)
ADLS_ACUITY_SCORE: 35

## 2023-01-24 ASSESSMENT — VISUAL ACUITY: OU: NORMAL ACUITY

## 2023-01-24 NOTE — DISCHARGE INSTRUCTIONS
Post-Ablation Discharge Instructions    Care of groin site:        Remove the Band-Aid after 24 hours. If there is minor oozing, apply another Band-aid and remove it after 12 hours.         Do NOT take a bath, use a hot tub, pool, or submerse in water for at least 3 days You may shower.         It is normal to have a small bruise or lump at the site.        Do not scrub the site.        Do not use lotion or powder near the puncture site for 3 days.        For the first 2 days: Do not stoop or squat. When you cough, sneeze or move your bowels, hold your hand over the puncture site and press gently.        Do not lift more than 10 pounds or exertional activity for 10 days.      If you start bleeding from the site in your groin:  Lie down flat and press firmly on the site.  Call your physician immediately, or, come to the emergency room.    Call 911 right away if you have bleeding that is heavy or does not stop.     Call your doctor/provider if:        You have a large or growing hard lump around the site.        The site is red, swollen, hot or tender.        Blood or fluid is draining from the site.        You have chills or a fever greater than 101 F (38 C).        Your leg or arm turns bluish, feels numb or cool.        You have hives, a rash or unusual itching.     Cardiovascular Clinic:   [address] [place] [address]. [place] [address]  Your Care Team:  EP Cardiology   Telephone Number     Shari Engle [phone]   Sherin Dugan RN [phone]     For scheduling appts or procedures:    Maisha Tellez   [phone]     As always, Thank you for trusting us with your health care needs    After you go home:  Have an adult stay with you for 24 hours.  Drink plenty of fluids.  You may eat your normal diet, unless your doctor tells you otherwise.  For 24 hours:  - Relax and take it easy.  - Do NOT smoke.  - Do NOT make any important or legal decisions.  - Do NOT drive or operate machines at  home or at work.  - Do NOT drink alcohol.

## 2023-01-24 NOTE — PROGRESS NOTES
Arrived to [address] for aflutter ablation in EP lab. AFVSS. Denies pain. Neuro assessment WNL; no deficits. Labs resulted. Bilat pp & pt pulses marked. Consent done. Pt's mom at bedside. Pt stable.

## 2023-01-24 NOTE — PROGRESS NOTES
D/I/A: Pt roomed on 3C in bay [address].  Arrived via litter and accompanied by RANDALL Carmona On/Off: Off monitor.  VSSA.  Rhythm upon arrival NSR on monitor.  Denies pain or sob.  Reviewed activity restrictions and when to notify RN, ie-changes to breathing or increased chest pressure or chest pain.  CCL access: R groin site with figure-8 suture in place, CDI, CMS+.  P: Continue to monitor status.  Discharge to home once meeting criteria.

## 2023-01-24 NOTE — H&P
Electrophysiology Pre-Procedure History and Physical    Xavi Mtz MRN# [id number]   Age: 32 year old YOB: 1990      Date of Procedure: 1/24/2023  [facility]      Date of Exam 1/24/2023 Facility (Same day)       Home clinic: [facility]  Primary care provider: Hamzah Atkins  HPI:  Mr. 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 He underwent a 33 mm prosthetic tricuspid valve placement in 1999 at [facility] followed by balloon valvuloplasty in 2017. He has developed an increasing tricuspid valve gradient over the past 2 years from 6-15 mmHg along with decreased exercise tolerance.He then underwent a 29mm Sloan 3 ranscatheter valve on 12/6/2019 at [place]. 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 Of note he had not had prior documented atrial flutter.  He does have a long history of paroxysmal tachycardia, which was not seen at this electrophysiology study.     He presents today to establish care. He presented to ER on 11/11/22 with AFL. He was given IV metoprolol and reportedly converted to sinus. During visit to ED, had palpitations and a few second rush feeling after termination. This one was longer and the flushing sensation after termination is new over th last 2 month. He has had palpitations for about 5 years and empirical ablation of the CTI before valve replacement in 2019 did not fix the palpitations A zio patch monitor from 9/29/22-10/6/22 showed 2%  AFL burden up to 3 hours 34 minutes with 8 symptom activations showing AFL and sinus. TTE done in 11/2022 showed a percutaneous 25mm balloon valvuloplasty of the tricuspid valve (7/27/17) and now after 29mm Sloan 3 ranscatheter valve on 12/6/2019, tricuspid valve mean gradient 6 mmHg. No signficant tricuspid valve insufficiency. There is moderate right ventricular enlargement with low normal systolic function. There is ventricular septal bouncing. Qualitatively low normal left ventricular systolic function. There is flow reversal in the hepatic veins during atrial systole.He has an organized AFL on EKG on 11/11/22  and Zio. We dicussed in detail the therapeutic options including AAT and ablation therapy. Using AAT in this situation might exacerbate sinus node dysfunction as he is reporting flushing sensation sometimes after termination. Beside this rhythm is organized and amenable to ablation. He elected to pursue ablation for which he presents today.          Active problem list:     Patient Active Problem List    Diagnosis Date Noted     Neck pain following catheterization sheath  07/28/2017     Priority: Medium     Ebstein's anomaly 03/24/2015     Priority: Medium     S/p tricuspid valve replacement 1999 with 33 mm CE prosthesis  Valve-in-valve tricuspid implantation with a Sloan 29 mm valve 12.6.2019       SVT (supraventricular tachycardia) (H) 03/24/2015     Priority: Medium            Medications (include herbals and vitamins):      Current Facility-Administered Medications   Medication     lidocaine (LMX4) cream     lidocaine 1 % 0.1-1 mL     sodium chloride (PF) 0.9% PF flush 3 mL     sodium chloride (PF) 0.9% PF flush 3 mL     sodium chloride (PF) 0.9% PF flush 3 mL     sodium chloride 0.9% infusion           Medication List      There are no discharge medications for this visit.              Allergies:      Allergies   Allergen Reactions     Ceclor [Cefaclor]      Mouth ulcers             Social History:  "    Social History     Tobacco Use     Smoking status: Some Days     Packs/day: 0.50     Years: 10.00     Pack years: 5.00     Types: Cigarettes     Smokeless tobacco: Current     Types: Chew     Tobacco comments:      Smokes E-Cigs   Substance Use Topics     Alcohol use: No     Alcohol/week: 0.0 standard drinks            Physical Exam:   All vitals have been reviewed  Patient Vitals for the past 8 hrs:   BP Temp Temp src Pulse Resp SpO2 Height Weight   01/24/23 0905 (!) 143/87 98  F (36.7  C) Oral 82 18 97 % 1.778 m (5' 10\") 96.8 kg (213 lb 8 oz)     No intake/output data recorded.  Airway assessment:   Patient is able to open mouth wide  Patient is able to stick out tongue}      ENT:   Normocephalic, without obvious abnormality, atraumatic, sinuses nontender on palpation, external ears without lesions, oral pharynx with moist mucous membranes, tonsils without erythema or exudates, gums normal and good dentition.     Neck:   Supple, symmetrical, trachea midline, no adenopathy, thyroid symmetric, not enlarged and no tenderness, skin normal     Lungs:   No increased work of breathing, good air exchange, clear to auscultation bilaterally, no crackles or wheezing     Cardiovascular:   Normal apical impulse, regular rate and rhythm, normal S1 and S2, no S3 or S4, and no murmur noted             Lab / Radiology Results:     Lab Results   Component Value Date    WBC 6.5 01/24/2023    WBC 6.6 01/09/2020    RBC 5.18 01/24/2023    RBC 5.06 01/09/2020    HGB 15.6 01/24/2023    HGB 15.7 01/09/2020    HCT 46.0 01/24/2023    HCT 44.7 01/09/2020    MCV 89 01/24/2023    MCV 88 01/09/2020    RDW 12.6 01/24/2023    RDW 12.1 01/09/2020     01/24/2023     01/09/2020      Lab Results   Component Value Date    WBC 6.5 01/24/2023    WBC 6.6 01/09/2020     Lab Results   Component Value Date     01/24/2023     01/09/2020     Lab Results   Component Value Date    HGB 15.6 01/24/2023    HGB 15.7 01/09/2020    HCT " 46.0 01/24/2023    HCT 44.7 01/09/2020     Lab Results   Component Value Date     01/24/2023     07/27/2017    CO2 20 01/24/2023    BUN 9.2 01/24/2023     Lab Results   Component Value Date     01/24/2023     07/27/2017    CO2 20 01/24/2023    BUN 9.2 01/24/2023     Lab Results   Component Value Date     01/24/2023     07/27/2017     Lab Results   Component Value Date    BUN 9.2 01/24/2023       Lab Results   Component Value Date    TSH 1.31 11/25/2022             Plan:   Patient's active problems diagnostically and therapeutically optimized for the planned procedure. Patient here for AFL ablation. Procedure explained in detail to patient including indications, risks, and benefits. The procedural risk of EP study and ablation were discussed in detail. Risks discussed include: vascular complications, CVA, AVB, pericardial effusion and tamponade. He states understanding and wishes to procced.     WENDY Chavarria CNP  Electrophysiology Consult Service  Pager: [phone]

## 2023-01-24 NOTE — Clinical Note
Potential access sites were evaluated for patency using ultrasound.   The right femoral vein was selected. Access was obtained under with Sonosite and Fluoroscopic guidance using a micropuncture 21 gauge needle with direct visualization of needle entry.

## 2023-01-24 NOTE — PROGRESS NOTES
D/I/A:  Patient is tolerating liquids and foods, ambulating, urinating, puncture sites are stable (no bleeding and no hematoma) and patient has a .  A&Ox4 and making needs known.  CCL access site: R groin with quickclot and tegaderm C/D/I; no bleeding or hematoma; CMS intact. VSSA. NSR on monitor.  IV access removed. Education completed and outlined in AVS or handout: educated patient on activity restrictions, when to seek medical attention, medications reviewed with patient. Questions answered prior to discharge.  Belongings returned to patient at discharge.    P: Discharged to self care.  Patient to follow up with appts as per discharge instruction. Patient transported to front door via wheelchair, socorro Wolfe to transport patient home.

## 2023-01-26 LAB
ATRIAL RATE - MUSE: 77 BPM
ATRIAL RATE - MUSE: 80 BPM
DIASTOLIC BLOOD PRESSURE - MUSE: NORMAL MMHG
DIASTOLIC BLOOD PRESSURE - MUSE: NORMAL MMHG
INTERPRETATION ECG - MUSE: NORMAL
INTERPRETATION ECG - MUSE: NORMAL
P AXIS - MUSE: 14 DEGREES
P AXIS - MUSE: 63 DEGREES
PR INTERVAL - MUSE: 168 MS
PR INTERVAL - MUSE: 192 MS
QRS DURATION - MUSE: 152 MS
QRS DURATION - MUSE: 158 MS
QT - MUSE: 436 MS
QT - MUSE: 446 MS
QTC - MUSE: 493 MS
QTC - MUSE: 514 MS
R AXIS - MUSE: 52 DEGREES
R AXIS - MUSE: 73 DEGREES
SYSTOLIC BLOOD PRESSURE - MUSE: NORMAL MMHG
SYSTOLIC BLOOD PRESSURE - MUSE: NORMAL MMHG
T AXIS - MUSE: 12 DEGREES
T AXIS - MUSE: 27 DEGREES
VENTRICULAR RATE- MUSE: 77 BPM
VENTRICULAR RATE- MUSE: 80 BPM

## 2023-04-02 ENCOUNTER — HEALTH MAINTENANCE LETTER (OUTPATIENT)
Age: 33
End: 2023-04-02

## 2023-04-24 NOTE — PROGRESS NOTES
[facility]    Assessment/Recommendations   Assessment/Plan:    Mr. 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     He has an organized AFL on EKG on 11/11/22  and Zio. We dicussed in detail the therapeutic options including AAT and ablation therapy. Using AAT in this situation might exacerbate sinus node dysfunction as he is reporting flushing sensation sometimes after termination. We also discussed ablation. He elected to undergo and ablation and had an ablation of atypical AFL (inferior lateral wall between lateral scar and IVC) and focal AT (mid posterior wall) on 1/24/23. He has had some residual palpitations after ablation 5 times lasting up to 3 hours. We will get an updated zio patch to confirm if any recurrent arrhyhtmia. We discussed would consider repeat ablation if continued AT. He will stay off Eliquis for now.     Follow-up in 6 months or sooner if need arises.      History of Present Illness/Subjective    Mr. Xavi Mtz is a 32 year old male who comes in today for EP consultation for atrial flutter.    Mr. 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 He underwent a 33 mm prosthetic tricuspid valve placement in 1999 at [facility] followed by balloon valvuloplasty in 2017. He has developed an increasing tricuspid valve gradient over the past 2 years from 6-15 mmHg along with decreased exercise tolerance.He then underwent a 29mm Sloan 3 ranscatheter valve on 12/6/2019 at [place]. 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 Of note he had not had prior documented atrial flutter.  He does have a long history of paroxysmal tachycardia, which was not seen at this electrophysiology study.    EP Visit 11/25/22: He presents today to establish care. He presented to ER on 11/11/22 with AFL. He was given IV metoprolol and reportedly converted to sinus. During visit to ED, had palpitations and a few second rush feeling after termination. This one was longer and the flushing sensation after termination is new over th last 2 month. He has had palpitations for about 5 years and empirical ablation of the CTI before valve replacement in 2019 did not fix the palpitations A zio patch monitor from 9/29/22-10/6/22 showed 2% AFL burden up to 3 hours 34 minutes with 8 symptom activations showing AFL and sinus. TTE done today showed a percutaneous 25mm balloon valvuloplasty of the tricuspid valve (7/27/17) and now after 29mm Sloan 3 ranscatheter valve on 12/6/2019, tricuspid valve mean gradient 6 mmHg. No signficant tricuspid valve insufficiency. There is moderate right ventricular enlargement with low normal systolic function. There is ventricular septal bouncing. Qualitatively low normal left ventricular systolic function. There is flow reversal in the hepatic veins during atrial systole. Presenting 12 lead ECG shows NSR with RBBB Vent Rate 71 bpm,   ms,  ms, QTc 493 ms. Current cardiac medications include: Eliquis, Toprol XL, Diltiazem, and ASA. Of note, warfarin was stopped about 5 months after surgery because of labile INRs, ELiquis was started in ED.    He presents today for follow up. He had atypical AFL ablation and focal AT ablation on 1/24/23. Groin sites well healed. He reports feeling well. He thinks he is likely off Eliquis but not sure and will confirm. He has had some palpitations 5 times since 1/2023. 10 minutes and one 3 hour episodes. . He denies chest discomfort, abdominal fullness/bloating or peripheral edema, shortness of breath, paroxysmal nocturnal dyspnea, orthopnea, lightheadedness, dizziness, pre-syncope, or syncope. Presenting 12 lead ECG shows SR Vent Rate 78 bpm,  ms,  ms, QTc 490 ms. Current cardiac medications include: Toprol XL, Diltiazem, ASA, and Eliquis.          Cardiographics (Personally Reviewed) :   TTE 11/23/2022:  Patient with history of Ebstein's Anomaly, after 33 mm tricuspid valve replacement. Percutaneous 25mm balloon valvuloplasty of the tricuspid valve (7/27/17). Now after 29mm Sloan 3 ranscatheter valve on 12/6/2019 at [place]. Tricuspid valve mean gradient 6 mmHg. No signficant tricuspid valve insufficiency. There is moderate right ventricular enlargement with low normal systolic function. There is ventricular septal bouncing. Qualitatively low normal left ventricular systolic function. There is flow reversal in the hepatic veins during atrial systole.    1/20/20 Echo:   ------CONCLUSIONS------  Patient with history of Ebstein's Anomaly, after 33 mm tricuspid valve replacement. Percutaneous 25mm balloon valvuloplasty of the tricuspid valve (7/27/17). Now after 29mm Sloan 3 ranscatheter valve on 12/6/2019 at [place]. Tricuspid valve mean gradient 6 mmHg. Trivial tricuspid valve insufficiency. 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 There is moderate  right ventricular enlargement with low normal systolic function. There is mildly decreased left ventricular systolic function. The calculated biplane left ventricular ejection fraction is 50 %. The inferior vena cava is dilated, with a diameter of 2.4 cm. There is flow reversal in the hepatic veins during atrial systole.    1/23/23 CTA Heart:  IMPRESSION:   1.  Ebstein's anomaly status post repair with reduction atrioplasty,  pericardial patch closure of ASD, and bioprosthetic tricuspid valve  replacement with subsequent valve-in-valve TVR with 29 mm Sloan 3  prosthesis. The transcatheter tricuspid valve prosthesis appears  grossly normal on limited evaluation.   2.  Moderate right ventricular enlargement.  3.  Both atria and atrial appendages are free of thrombus.   4.  The coronary sinus is narrowed at its ostium/confluence with the  right atrium. Coronary and systemic venous anatomy is otherwise  Normal.    2017 Stress Test:   The patient exercised for 8 minutes and 20 seconds.    The patient exercised into Stage 3 of the Standard Lv Protocol, achieving a heart rate of 151 bpm in sinus rhythm.   Blood pressure ish appropriately for each stage of exercise.  Heart rate was suboptimal with peak rate of 151 at peak exercise (79% predicted).  Baseline heart rate was 75 bpm and ish to 151 bpm with baseline blood pressure of 112/68 mmHg and ish to a max blood pressure of 154/76 mmHg at the end of stage 2. At the end of stage 3, blood pressure was 150/70 mmHg and dropped to 92/40 47 seconds into recovery  There were no arrhythmias.  There were no ST-T changes.  Exercise was stopped due to lightheadedness.  The patient recovered uneventfully from exercise.VO2 was 28.7 ml/kg/min (predicted 39.5), RER 1.02, VO2/heart rate was 17 mm/beat, predicted 18, but stroke volume curve is blunted suggesting limited cardiac output, VE/VCO2 slope was 45 (predicted 29)    2017 Heart Cath:  Low CI at 1.92 L/min/m2, TV area  "decreased 0.64 cm2, indexed valve area 0.31cm2/m2, mean gradient across TV 11 mmHg despite low CO. Decreased SBP to mid 60s, but improvement with dopamine and albumin infusion.    Balloon valvuloplasty with Tyshak II 25 mm x 5 cm balloon and Z Med II 25 mm x 4 cm balloons.  CI improved to 3.51 L/min/m2, TV area significantly improved 1.8 cm2 (indexed valve area 0.87 cm2/m2), mean gradient improved to 5.3 mmHg. SBPs were maintained in 90s after discontinuation of Dopamine.        Physical Examination   /83 (BP Location: Right arm, Patient Position: Sitting, Cuff Size: Adult Large)   Pulse 82   Ht 1.791 m (5' 10.51\")   Wt 100.1 kg (220 lb 10.9 oz)   SpO2 98%   BMI 31.21 kg/m    Wt Readings from Last 3 Encounters:   01/24/23 96.8 kg (213 lb 8 oz)   11/25/22 99.1 kg (218 lb 8 oz)   11/25/22 99.1 kg (218 lb 8 oz)     General Appearance:   Alert, well-appearing and in no acute distress.   HEENT: Atraumatic, normocephalic. PERRL.  MMM.   Chest/Lungs:   Respirations unlabored.  Lungs are clear to auscultation.   Cardiovascular:   Regular rate and rhythm.  S1/S2. No murmur.    Abdomen:  Soft, nontender, nondistended.   Extremities: No cyanosis or clubbing. No edema..    Musculoskeletal: Moves all extremities.  Gait normal.   Skin: Warm, dry, intact.    Neurologic: Mood and affect are appropriate.  Alert and oriented to person, place, time, and situation.          Medications  Allergies   Current Outpatient Medications   Medication Sig Dispense Refill     amoxicillin (AMOXIL) 500 MG capsule Take 4 capsules (2,000 mg) by mouth once as needed (one hour prior to dental procedures.) 4 capsule 3     apixaban ANTICOAGULANT (ELIQUIS) 5 MG tablet 5 mg twice a day for 60 days. 60 tablet 1     aspirin (ASA) 81 MG tablet Take 1 tablet (81 mg) by mouth daily 90 tablet 3     diltiazem ER COATED BEADS (CARDIZEM CD/CARTIA XT) 180 MG 24 hr capsule Take 1 capsule (180 mg) by mouth every morning (before breakfast) 90 capsule 3     " metoprolol succinate ER (TOPROL XL) 50 MG 24 hr tablet Take 1 tablet (50 mg) by mouth daily 90 tablet 3     multivitamin w/minerals (MULTI-VITAMIN) tablet Take 1 tablet by mouth daily      Allergies   Allergen Reactions     Ceclor [Cefaclor]      Mouth ulcers         Lab Results (Personally Reviewed)    Chemistry/lipid CBC Cardiac Enzymes/BNP/TSH/INR   Lab Results   Component Value Date    BUN 9.2 01/24/2023     01/24/2023    CO2 20 (L) 01/24/2023     Creatinine   Date Value Ref Range Status   01/24/2023 0.85 0.67 - 1.17 mg/dL Final       Lab Results   Component Value Date    CHOL 170 11/25/2022    HDL 31 (L) 11/25/2022    LDL 86 11/25/2022      Lab Results   Component Value Date    WBC 6.5 01/24/2023    HGB 15.6 01/24/2023    HCT 46.0 01/24/2023    MCV 89 01/24/2023     01/24/2023    Lab Results   Component Value Date    TSH 1.31 11/25/2022    INR 3.28 (H) 01/09/2020        The patient states understanding and is agreeable with the plan.   Ventura Engle MD [facility]  Cardiology - Electrophysiology      Total time spent on patient visit, reviewing notes, imaging, labs, orders, and completing necessary documentation: 45 minutes.  >50% of visit spent on counseling patient and/or coordination of care.

## 2023-04-27 ASSESSMENT — ENCOUNTER SYMPTOMS
SLEEP DISTURBANCES DUE TO BREATHING: 0
ORTHOPNEA: 0
SLEEP DISTURBANCES DUE TO BREATHING: 0
EXERCISE INTOLERANCE: 1
LEG PAIN: 0
LEG PAIN: 0
LIGHT-HEADEDNESS: 0
SYNCOPE: 0
ORTHOPNEA: 0
SYNCOPE: 0
HYPOTENSION: 0
PALPITATIONS: 1
PALPITATIONS: 1
HYPERTENSION: 0
HYPERTENSION: 0
HYPOTENSION: 0
EXERCISE INTOLERANCE: 1
LIGHT-HEADEDNESS: 0

## 2023-04-28 ENCOUNTER — OFFICE VISIT (OUTPATIENT)
Dept: CARDIOLOGY | Facility: CLINIC | Age: 33
End: 2023-04-28
Payer: COMMERCIAL

## 2023-04-28 ENCOUNTER — ANCILLARY PROCEDURE (OUTPATIENT)
Dept: CARDIOLOGY | Facility: CLINIC | Age: 33
End: 2023-04-28
Payer: COMMERCIAL

## 2023-04-28 ENCOUNTER — ANCILLARY PROCEDURE (OUTPATIENT)
Dept: CARDIOLOGY | Facility: CLINIC | Age: 33
End: 2023-04-28
Attending: INTERNAL MEDICINE
Payer: COMMERCIAL

## 2023-04-28 VITALS
SYSTOLIC BLOOD PRESSURE: 123 MMHG | HEART RATE: 82 BPM | WEIGHT: 220.68 LBS | DIASTOLIC BLOOD PRESSURE: 83 MMHG | BODY MASS INDEX: 30.9 KG/M2 | OXYGEN SATURATION: 98 % | HEIGHT: 71 IN

## 2023-04-28 VITALS
SYSTOLIC BLOOD PRESSURE: 123 MMHG | WEIGHT: 220.7 LBS | DIASTOLIC BLOOD PRESSURE: 83 MMHG | OXYGEN SATURATION: 98 % | HEIGHT: 71 IN | HEART RATE: 82 BPM | BODY MASS INDEX: 30.9 KG/M2

## 2023-04-28 DIAGNOSIS — I47.10 SVT (SUPRAVENTRICULAR TACHYCARDIA) (H): ICD-10-CM

## 2023-04-28 DIAGNOSIS — I48.92 ATRIAL FLUTTER, UNSPECIFIED TYPE (H): ICD-10-CM

## 2023-04-28 DIAGNOSIS — Q22.5 EBSTEIN'S ANOMALY: ICD-10-CM

## 2023-04-28 DIAGNOSIS — Q22.5 EBSTEIN'S ANOMALY: Primary | ICD-10-CM

## 2023-04-28 LAB
ATRIAL RATE - MUSE: 78 BPM
DIASTOLIC BLOOD PRESSURE - MUSE: NORMAL MMHG
INTERPRETATION ECG - MUSE: NORMAL
P AXIS - MUSE: 19 DEGREES
PR INTERVAL - MUSE: 186 MS
QRS DURATION - MUSE: 154 MS
QT - MUSE: 430 MS
QTC - MUSE: 490 MS
R AXIS - MUSE: 64 DEGREES
SYSTOLIC BLOOD PRESSURE - MUSE: NORMAL MMHG
T AXIS - MUSE: 24 DEGREES
VENTRICULAR RATE- MUSE: 78 BPM

## 2023-04-28 PROCEDURE — 99214 OFFICE O/P EST MOD 30 MIN: CPT | Mod: 25

## 2023-04-28 PROCEDURE — 93325 DOPPLER ECHO COLOR FLOW MAPG: CPT | Performed by: PEDIATRICS

## 2023-04-28 PROCEDURE — 93303 ECHO TRANSTHORACIC: CPT | Performed by: PEDIATRICS

## 2023-04-28 PROCEDURE — G0463 HOSPITAL OUTPT CLINIC VISIT: HCPCS | Performed by: INTERNAL MEDICINE

## 2023-04-28 PROCEDURE — 93005 ELECTROCARDIOGRAM TRACING: CPT | Mod: XU

## 2023-04-28 PROCEDURE — 93242 EXT ECG>48HR<7D RECORDING: CPT

## 2023-04-28 PROCEDURE — 99213 OFFICE O/P EST LOW 20 MIN: CPT | Mod: 27

## 2023-04-28 PROCEDURE — 93320 DOPPLER ECHO COMPLETE: CPT | Performed by: PEDIATRICS

## 2023-04-28 PROCEDURE — 99215 OFFICE O/P EST HI 40 MIN: CPT | Mod: 25 | Performed by: INTERNAL MEDICINE

## 2023-04-28 RX ORDER — SPIRONOLACTONE 25 MG/1
25 TABLET ORAL DAILY
Qty: 90 TABLET | Refills: 3 | Status: SHIPPED | OUTPATIENT
Start: 2023-04-28 | End: 2024-05-08

## 2023-04-28 ASSESSMENT — PAIN SCALES - GENERAL
PAINLEVEL: NO PAIN (0)
PAINLEVEL: NO PAIN (0)

## 2023-04-28 NOTE — LETTER
4/28/2023      RE: Xavi Mtz  [address] [facility] [address]       Dear Colleague,    Thank you for the opportunity to participate in the care of your patient, Xavi Mtz, at the [facility] [place] [facility]. Please see a copy of my visit note below.        [facility] ELECTROPHYSIOLOGY CLINIC VISIT    Assessment/Recommendations   Assessment/Plan:    Mr. 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     He has an organized AFL on EKG on 11/11/22  and Zio. We dicussed in detail the therapeutic options including AAT and ablation therapy. Using AAT in this situation might exacerbate sinus node dysfunction as he is reporting flushing sensation sometimes after termination. We also discussed ablation. He elected to undergo and ablation and had an ablation of atypical AFL (inferior lateral wall between lateral scar and IVC) and focal AT (mid posterior wall) on 1/24/23. He has had some residual palpitations after ablation 5 times lasting up to 3 hours. We will get an updated zio patch to confirm if any recurrent arrhyhtmia. We discussed would consider repeat ablation if continued AT. He will stay off Eliquis for now.     Follow-up in 6 months or sooner if need arises.      History of Present Illness/Subjective    Mr. Xavi Mtz is a 32 year old male who comes in today for EP consultation for atrial flutter.    Mr. 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 He underwent a 33 mm prosthetic tricuspid valve placement in 1999 at [facility] followed by balloon valvuloplasty in 2017. He has developed an increasing tricuspid valve gradient over the past 2 years from 6-15 mmHg along with decreased exercise tolerance.He then underwent a 29mm Sloan 3 ranscatheter valve on 12/6/2019 at [place]. 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 Of note he had not had prior documented atrial flutter.  He does have a long history of paroxysmal tachycardia, which was not seen at this electrophysiology study.    EP Visit 11/25/22: He presents today to establish care. He presented to ER on 11/11/22 with AFL. He was given IV metoprolol and reportedly converted to sinus. During visit to ED, had palpitations and a few second rush feeling after termination. This one was longer and the flushing sensation after termination is new over th last 2 month. He has had palpitations for about 5 years and empirical ablation of the CTI before valve replacement in 2019 did not fix the palpitations A zio patch monitor from 9/29/22-10/6/22 showed 2% AFL burden up to 3 hours 34 minutes with 8 symptom activations showing AFL and sinus. TTE done today showed a percutaneous 25mm balloon valvuloplasty of the tricuspid valve (7/27/17) and now after 29mm Sloan 3 ranscatheter valve on 12/6/2019, tricuspid valve mean gradient 6 mmHg. No  signficant tricuspid valve insufficiency. There is moderate right ventricular enlargement with low normal systolic function. There is ventricular septal bouncing. Qualitatively low normal left ventricular systolic function. There is flow reversal in the hepatic veins during atrial systole. Presenting 12 lead ECG shows NSR with RBBB Vent Rate 71 bpm,  ms,  ms, QTc 493 ms. Current cardiac medications include: Eliquis, Toprol XL, Diltiazem, and ASA. Of note, warfarin was stopped about 5 months after surgery because of labile INRs, ELiquis was started in ED.    He presents today for follow up. He had atypical AFL ablation and focal AT ablation on 1/24/23. Groin sites well healed. He reports feeling well. He thinks he is likely off Eliquis but not sure and will confirm. He has had some palpitations 5 times since 1/2023. 10 minutes and one 3 hour episodes. . He denies chest discomfort, abdominal fullness/bloating or peripheral edema, shortness of breath, paroxysmal nocturnal dyspnea, orthopnea, lightheadedness, dizziness, pre-syncope, or syncope. Presenting 12 lead ECG shows SR Vent Rate 78 bpm,  ms,  ms, QTc 490 ms. Current cardiac medications include: Toprol XL, Diltiazem, ASA, and Eliquis.          Cardiographics (Personally Reviewed) :   TTE 11/23/2022:  Patient with history of Ebstein's Anomaly, after 33 mm tricuspid valve replacement. Percutaneous 25mm balloon valvuloplasty of the tricuspid valve (7/27/17). Now after 29mm Sloan 3 ranscatheter valve on 12/6/2019 at [place]. Tricuspid valve mean gradient 6 mmHg. No signficant tricuspid valve insufficiency. There is moderate right ventricular enlargement with low normal systolic function. There is ventricular septal bouncing. Qualitatively low normal left ventricular systolic function. There is flow reversal in the hepatic veins during atrial systole.    1/20/20 Echo:   ------CONCLUSIONS------  Patient with history of Ebstein's Anomaly, after 33  mm tricuspid valve replacement. Percutaneous 25mm balloon valvuloplasty of the tricuspid valve (7/27/17). Now after 29mm Sloan 3 ranscatheter valve on 12/6/2019 at [place]. Tricuspid valve mean gradient 6 mmHg. Trivial tricuspid valve insufficiency. 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 There is moderate right ventricular enlargement with low normal systolic function. There is mildly decreased left ventricular systolic function. The calculated biplane left ventricular ejection fraction is 50 %. The inferior vena cava is dilated, with a diameter of 2.4 cm. There is flow reversal in the hepatic veins during atrial systole.    1/23/23 CTA Heart:  IMPRESSION:   1.  Ebstein's anomaly status post repair with reduction atrioplasty,  pericardial patch closure of ASD, and bioprosthetic tricuspid valve  replacement with subsequent valve-in-valve TVR with 29 mm Sloan 3  prosthesis. The transcatheter tricuspid valve prosthesis appears  grossly normal on limited evaluation.   2.  Moderate right ventricular enlargement.  3.  Both atria and atrial appendages are free of thrombus.   4.  The coronary sinus is narrowed at its ostium/confluence with the  right atrium. Coronary and systemic venous anatomy is otherwise  Normal.    2017 Stress Test:   The patient exercised for 8 minutes and 20 seconds.    The patient exercised into Stage 3 of the Standard Lv Protocol, achieving a heart rate of 151 bpm in sinus rhythm.   Blood pressure ish appropriately for each stage of exercise.  Heart rate was suboptimal with peak rate of 151 at peak exercise (79% predicted).  Baseline heart rate was 75 bpm and ish to 151 bpm with baseline blood pressure of 112/68 mmHg and ish to a max blood pressure of 154/76 mmHg at the end of stage 2. At the end of stage 3, blood pressure was 150/70 mmHg and dropped to 92/40 47 seconds into recovery  There were no arrhythmias.  There were no ST-T  "changes.  Exercise was stopped due to lightheadedness.  The patient recovered uneventfully from exercise.VO2 was 28.7 ml/kg/min (predicted 39.5), RER 1.02, VO2/heart rate was 17 mm/beat, predicted 18, but stroke volume curve is blunted suggesting limited cardiac output, VE/VCO2 slope was 45 (predicted 29)    2017 Heart Cath:  Low CI at 1.92 L/min/m2, TV area decreased 0.64 cm2, indexed valve area 0.31cm2/m2, mean gradient across TV 11 mmHg despite low CO. Decreased SBP to mid 60s, but improvement with dopamine and albumin infusion.    Balloon valvuloplasty with Tyshak II 25 mm x 5 cm balloon and Z Med II 25 mm x 4 cm balloons.  CI improved to 3.51 L/min/m2, TV area significantly improved 1.8 cm2 (indexed valve area 0.87 cm2/m2), mean gradient improved to 5.3 mmHg. SBPs were maintained in 90s after discontinuation of Dopamine.        Physical Examination   /83 (BP Location: Right arm, Patient Position: Sitting, Cuff Size: Adult Large)   Pulse 82   Ht 1.791 m (5' 10.51\")   Wt 100.1 kg (220 lb 10.9 oz)   SpO2 98%   BMI 31.21 kg/m    Wt Readings from Last 3 Encounters:   01/24/23 96.8 kg (213 lb 8 oz)   11/25/22 99.1 kg (218 lb 8 oz)   11/25/22 99.1 kg (218 lb 8 oz)     General Appearance:   Alert, well-appearing and in no acute distress.   HEENT: Atraumatic, normocephalic. PERRL.  MMM.   Chest/Lungs:   Respirations unlabored.  Lungs are clear to auscultation.   Cardiovascular:   Regular rate and rhythm.  S1/S2. No murmur.    Abdomen:  Soft, nontender, nondistended.   Extremities: No cyanosis or clubbing. No edema..    Musculoskeletal: Moves all extremities.  Gait normal.   Skin: Warm, dry, intact.    Neurologic: Mood and affect are appropriate.  Alert and oriented to person, place, time, and situation.          Medications  Allergies   Current Outpatient Medications   Medication Sig Dispense Refill    amoxicillin (AMOXIL) 500 MG capsule Take 4 capsules (2,000 mg) by mouth once as needed (one hour prior to " dental procedures.) 4 capsule 3    apixaban ANTICOAGULANT (ELIQUIS) 5 MG tablet 5 mg twice a day for 60 days. 60 tablet 1    aspirin (ASA) 81 MG tablet Take 1 tablet (81 mg) by mouth daily 90 tablet 3    diltiazem ER COATED BEADS (CARDIZEM CD/CARTIA XT) 180 MG 24 hr capsule Take 1 capsule (180 mg) by mouth every morning (before breakfast) 90 capsule 3    metoprolol succinate ER (TOPROL XL) 50 MG 24 hr tablet Take 1 tablet (50 mg) by mouth daily 90 tablet 3    multivitamin w/minerals (MULTI-VITAMIN) tablet Take 1 tablet by mouth daily      Allergies   Allergen Reactions    Ceclor [Cefaclor]      Mouth ulcers         Lab Results (Personally Reviewed)    Chemistry/lipid CBC Cardiac Enzymes/BNP/TSH/INR   Lab Results   Component Value Date    BUN 9.2 01/24/2023     01/24/2023    CO2 20 (L) 01/24/2023     Creatinine   Date Value Ref Range Status   01/24/2023 0.85 0.67 - 1.17 mg/dL Final       Lab Results   Component Value Date    CHOL 170 11/25/2022    HDL 31 (L) 11/25/2022    LDL 86 11/25/2022      Lab Results   Component Value Date    WBC 6.5 01/24/2023    HGB 15.6 01/24/2023    HCT 46.0 01/24/2023    MCV 89 01/24/2023     01/24/2023    Lab Results   Component Value Date    TSH 1.31 11/25/2022    INR 3.28 (H) 01/09/2020        The patient states understanding and is agreeable with the plan.   Ventura Engle MD [facility]  Cardiology - Electrophysiology      Total time spent on patient visit, reviewing notes, imaging, labs, orders, and completing necessary documentation: 45 minutes.  >50% of visit spent on counseling patient and/or coordination of care.

## 2023-04-28 NOTE — PATIENT INSTRUCTIONS
You were seen today in the Adult Congenital and Cardiovascular Genetics Clinic at the [facility].    Cardiology Providers you saw during your visit:  Krathik Balderas MD and Ventura Engle MD    Diagnosis: Ebstein's    Results:  Karthik Balderas MD and Ventura Engle MD reviewed the results of your EKG and echo testing today in clinic.    Recommendations for you:    Please wear zio for 7 days- will follow up on results  START spironolactone 25mg daily  Please go to the dentist  Increase daily exercise      General Cardiac Recommendations:  Continue to eat a heart healthy, low salt diet.  Continue to get 20-30 minutes of aerobic activity, 4-5 days per week.  Examples of aerobic activity include walking, running, swimming, cycling, etc.  Continue to observe good oral hygiene, with regular dental visits.      SBE prophylaxis:   Yes__X__  No____    If YES is checked, follow the recommendations outlined below:  Take antibiotic(s) prior to recommended dental procedures and procedures on the respiratory tract or with infected skin, muscle or bones. SBE prophylaxis is not needed for routine GI and  procedures (ie. Colonoscopy or vaginal delivery)  Observe good oral hygiene daily, as advised by your dentist. Get regular professional dental care.  Keep cuts clean.  Infections should be treated promptly.  Symptoms of Infective Endocarditis could include: fever lasting more than 4-5 days or a recurrent fever that initially resolves but returns within 1-2 days)      Exercise restrictions:   Yes__X__  No____         If yes, list restrictions:  Must be allowed to rest if fatigued or SOB      FASTING CHOLESTEROL was checked in the last 5 years YES_X__  NO___ (2022)  If no, please follow up with your primary care physician. You should have a cholesterol screening every 5 years.      Follow-up: Follow up with Dr. Balderas in 6 months with a CPX and cardiac MRI prior. Dr. Engle follow up based on zio results.     If you have questions  or concerns please contact us at:    Emily Morillo, MPH, RN, BSN   Madeline Jimenez (Scheduling)  Nurse Care Coordinator     Clinic   Adult Congenital and CV Genetics   Adult Congenital and CV Genetic  [facility]   [facility]  (P) [phone]     (P) [phone]  [email or web address]  (F) [phone]        For after hours urgent needs, call [phone] and ask to speak to the Adult Congenital Physician on call.  Mention Job Code 0401.    For emergencies call 911.    [facility]  Mail Code [id number]  [address] [place] [address]    CardioPulmonary Stress Test (CPX)  CPX is a maximal (meaning you exercise to exhaustion, not to achieve a heart rate) exercise test where we measure how well your heart/body uses oxygen or energy. It is the gold standard for measuring functional capacity and helps us differentiate limitations due to lungs, heart, or fitness.   A CPX is NOT a typical stress test. You will NOT be asked to hold your Beta Blocker medication.   Follow these instructions:    1. Report to the GOLD waiting room in the [facility] hospital.  2. Nothing to eat for 3 hours prior to your test. You may have clear liquids up to the time of your test.  3. Please wear loose, two-piece clothing and comfortable, rubber-soled shoes for walking.       What happens during this test?   We will place a blood pressure cuff on your arm. We will also attach small pads to your chest. The pads are hooked to an EKG (electrocardiogram) machine that shows how your heart is working.  You will walk on a treadmill or pedal a bicycle.  You will breathe through a mouthpiece, and the air you breathe out will be measured at rest and during exercise.  We will watch your EKG, heart rate and heart rhythm the entire time.  We will check your blood pressure during the  test.  This test takes two (2) hours.    Cardiac MRI  Magnetic resonance imaging (MRI) is a test that lets your doctor see detailed pictures of the inside of your body. MRI combines the use of strong magnets and radio waves to form an MRI image. A Cardiac MRI will give a detailed image of your heart.  Follow these instructions:  1. Please no eating or drinking 3 hours prior to the procedure.   2. No caffeine, alcohol or tobacco 12 hours prior to the procedure.    During the procedure:  Please arrive to the Gold Waiting Room in the [facility].   You will be required to lay flat and follow breath-hold instructions.   You will need to remove all metal and answer a safety questionnaire. Please wear clothes without metal (snaps and zippers). Please remove any body piercings and hair extensions before you arrive. You will also remove watches, jewelry, hairpins, wallets, dentures, partial dental plates and hearing aids. You may wear contact lenses, and you may be able to wear your rings. We have a safe place to keep your personal items, but it is safer to leave them at home.  You will be given IV contrast for this exam.  To prepare:  The day before the exam drink extra fluid - at least six 8oz glasses (unless you are on a fluid restriction per your doctor)

## 2023-04-28 NOTE — NURSING NOTE
Chief Complaint   Patient presents with     Follow Up     [facility]HD 4/28/2022: 32 year old male with history of Ebstein's anomaly presenting for evaluation.         Vitals were taken, medications reconciled and EKG performed.     Zev Quinn, EMT   8:43 AM

## 2023-04-28 NOTE — NURSING NOTE
Chief Complaint   Patient presents with     Follow Up     32 year old male with history of Ebstein's anomaly presenting for evaluation.         Vitals were taken, medications reconciled and EKG performed.       Zev Quinn, EMT   8:50 AM

## 2023-04-28 NOTE — PROCEDURES
Adult Congenital Cardiology Visit    Patient:  Xavi Mtz MRN:  [id number]   YOB: 1990 Age:  32 year old   Date of Visit:  Apr 28, 2023 PCP:  Hamzah Atkins MD     Dear Dr. Atkins,     I had the pleasure of seeing  Xavi Mtz at the [facility] Adult Congenital and Cardiovascular Genetics Clinic on Apr 28, 2023.   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 [facility] Dr. Engle from  for management of his Ebsteins anomaly and recurrent atrial tachycardia. Xavi reports that he has had very poor exercise tolerance for the last 10 years. He is not able to play outside for any length of time with his 8 year old son. He is working but fatigued with intermittent palpitations. . No LOC, edema, orthopnea.  HE presented in intermittent atrial tachycardia in November 2022 and is on eliquis, diltiazem, Toprol XL and ASA. MAYCOLe underwent aflutter ablation in January 2023.       Xavi reports that he continues to feel his chest pounding - can last minutes to hours. He gets lightheaded and dizzy. His exercise tolerance is reduced and he struggles with a single flight of stairs. No edema, LOC, chest pain. DOes have shortness of breath.       Past medical history:    Born with Ebsteins anomaly  S/p bioprosthetic tricuspid valve replacement 1999 [facility]  Tricuspid valve balloon valvuloplasty 2017 [facility]  Cardiac Cath 2017 with RVEDP 13, LVEDP 12 PAP 24/9 CI 1.92 L/min/m2.   Placement of a 29 mm Sloan 3 transcatheter valve in tricuspid position with empiric ablation of  IVC/TV in 12/2019  A Flutter Ablation JAn 2023 ([place])  Tobacco use- ongoing      Past Medical History:   Diagnosis Date     Congenital heart disease     Ebstein's Anomaly       Current Outpatient Medications   Medication Sig Dispense Refill     amoxicillin (AMOXIL) 500 MG capsule  "Take 4 capsules (2,000 mg) by mouth once as needed (one hour prior to dental procedures.) 4 capsule 3     apixaban ANTICOAGULANT (ELIQUIS) 5 MG tablet 5 mg twice a day for 60 days. 60 tablet 1     aspirin (ASA) 81 MG tablet Take 1 tablet (81 mg) by mouth daily 90 tablet 3     diltiazem ER COATED BEADS (CARDIZEM CD/CARTIA XT) 180 MG 24 hr capsule Take 1 capsule (180 mg) by mouth every morning (before breakfast) 90 capsule 3     multivitamin w/minerals (MULTI-VITAMIN) tablet Take 1 tablet by mouth daily       metoprolol succinate ER (TOPROL XL) 50 MG 24 hr tablet Take 1 tablet (50 mg) by mouth daily (Patient not taking: Reported on 4/28/2023) 90 tablet 3       Allergies   Allergen Reactions     Ceclor [Cefaclor]      Mouth ulcers         Family History:   Family History   Problem Relation Age of Onset     Diabetes Mother           Social history:  9 yo son healthy, no cardiac disease, expecting second child this spring.   Social History     Occupational History     Not on file   Tobacco Use     Smoking status: Some Days     Packs/day: 0.50     Years: 10.00     Pack years: 5.00     Types: Cigarettes     Smokeless tobacco: Current     Types: Chew     Tobacco comments:      Smokes E-Cigs   Vaping Use     Vaping status: Not on file   Substance and Sexual Activity     Alcohol use: No     Alcohol/week: 0.0 standard drinks of alcohol     Drug use: No     Sexual activity: Not on file       Marital Status:       Review of Systems: A comprehensive review of systems was performed and is negative, except as noted in the HPI and PMH  Review of Systems     Physical exam: Vitals: /83 (BP Location: Right arm, Patient Position: Sitting, Cuff Size: Adult Large)   Pulse 82   Ht 1.791 m (5' 10.51\")   Wt 100.1 kg (220 lb 11.2 oz)   SpO2 98%   BMI 31.21 kg/m    BMI= Body mass index is 31.21 kg/m .   There is no central or peripheral cyanosis. Pupils are reactive and sclera are not jaundiced. There is no conjunctival " injection or discharge. EOMI. Mucous membranes are moist and pink.   Lungs are clear to ausculation bilaterally with no wheezes, rales or rhonchi. There is no increased work of breathing, retractions or nasal flaring. Precordium is quiet with a normally placed apical impulse. On auscultation, heart sounds are regular with an accentuated S1 and normal S2. There are no murmurs, rubs or gallops.  Abdomen is soft and non-tender without masses or hepatomegaly. Femoral pulses are normal with no brachial femoral delay.Skin is without rashes, lesions, or significant bruising. Extremities are warm and well-perfused with no cyanosis, clubbing or edema. Peripheral pulses are normal and there is < 2 sec capillary refill. Patient is alert and oriented and moves all extremities equally with normal tone.     An echocardiogram performed today is notable for:  Tricuspid valve mean gradient stable at 5 mm mmHg. No signficant tricuspid valve insufficiency. There is moderate right ventricular enlargement with low normal systolic function. There is ventricular septal bouncing. Qualitatively low normal left ventricular systolic function. There is flow reversal in the  hepatic veins during atrial systole.    Results for orders placed or performed in visit on 04/28/23   EKG 12-lead, tracing only (Same Day)     Status: None   Result Value Ref Range    Systolic Blood Pressure  mmHg    Diastolic Blood Pressure  mmHg    Ventricular Rate 78 BPM    Atrial Rate 78 BPM    HI Interval 186 ms    QRS Duration 154 ms     ms    QTc 490 ms    P Axis 19 degrees    R AXIS 64 degrees    T Axis 24 degrees    Interpretation ECG       Sinus rhythm  Right bundle branch block  Abnormal ECG  When compared with ECG of 24-JAN-2023 15:29,  No significant change was found  Confirmed by MD SHERLY, JOYCE ([id number]) on 4/28/2023 12:50:14 PM     Results for orders placed or performed in visit on 04/28/23   Echo Congenital Adult     Status: None    Narrative     [id number]  [id number]  [id number]  [id number]^JAN^CHERRI^ROSALIA                                                               Study ID: [id number]                                                 [facility]'[address]                                                [place] [address]                                                Phone: [phone]                                Pediatric Echocardiogram  ______________________________________________________________________________  Name: BLADE MORFIN  Study Date: 2023 07:46 AM                     Patient Location: [facility]  MRN: [id number]                                     Age: 32 yrs  : 1990  Gender: Male  Patient Class: Outpatient                           Height: 178 cm  Ordering Provider: CHERRI MONTOYA                Weight: 97 kg  Referring Provider: CHERRI MONTOYA               BSA: 2.1 m2  Performed By: Sarah Benavides  Report approved by: Raquel Li MD  Reason For Study: Ebstein's anomaly, SVT (supraventricular tachycardia) (H),  Atria  ______________________________________________________________________________  ##### CONCLUSIONS #####  Patient with history of Ebstein's Anomaly, after 33 mm tricuspid valve  replacement. Percutaneous 25mm balloon valvuloplasty of the tricuspid valve  (17). Now after 29mm Sloan 3 transcatheter valve on 2019 at [facility].  Tricuspid valve mean gradient 5 mmHg. No signficant tricuspid valve  insufficiency. There is moderate right ventricular enlargement with low normal  systolic function. The calculated biplane left ventricular ejection fraction  is 58 %. There is ventricular septal bouncing. Qualitatively low normal left  ventricular systolic function. There is flow reversal in the hepatic veins  during atrial systole.      ______________________________________________________________________________  Technical information:  A complete two dimensional, MMODE, spectral and color Doppler transthoracic  echocardiogram is performed. Images are obtained from parasternal, apical,  subcostal and suprasternal notch views. Technically difficult study due to  poor acoustic windows. Prior echocardiogram available for comparison. ECG  tracing shows regular rhythm.     Segmental Anatomy:  There is normal atrial arrangement, with concordant atrioventricular and  ventriculoarterial connections.     Systemic and pulmonary veins:  The inferior vena cava drains normally to the right atrium. There is flow  reversal in the hepatic veins during atrial systole. Color flow demonstrates  flow from two pulmonary veins entering the left atrium.     Atria and atrial septum:  The left atrium is normal in size. There is moderate right atrial enlargement.  The atrial septum is not well visualized.     Atrioventricular valves:  Ebstein's anomaly of the tricuspid valve. Post tricuspid valve replacement  with a porcine prosthetic valve. Post percutaneous balloon valvuloplasty of  the tricuspid valve. Tricuspid valve mean gradient 5 mmHg. The mitral valve is  normal in appearance and motion. There is no mitral valve insufficiency.     Ventricles and Ventricular Septum:  There is moderate right ventricular enlargement. Low normal right ventricular  systolic function. There is ventricular septal bouncing. Normal left  ventricular size. The left ventricular dimmensions measured by MMODE are  affected by the abnormal septal motion. There is mild flattening of the  ventricular septum in systole. The calculated biplane left ventricular  ejection fraction is 58 %.     Outflow tracts:  Normal great artery relationship. There is unobstructed flow through the right  ventricular outflow tract. The pulmonary valve motion is normal. There is  normal flow across the pulmonary  valve. Trivial pulmonary valve insufficiency.  There is unobstructed flow through the left ventricular outflow tract.  Tricuspid aortic valve with normal appearance and motion. There is normal flow  across the aortic valve.     Great arteries:  The main pulmonary artery has normal appearance. There is unobstructed flow in  the main pulmonary artery. The pulmonary artery bifurcation is normal. There  is unobstructed flow in both branch pulmonary arteries. Normal ascending  aorta. The aortic arch appears normal. There is unobstructed antegrade flow in  the ascending, transverse arch, descending thoracic and abdominal aorta.     Arterial Shunts:  The ductal region is not imaged with this study.     Coronaries:  The coronary arteries are not evaluated.     Effusions, catheters, cannulas and leads:  No pericardial effusion.     MMode/2D Measurements & Calculations  LA dimension: 3.6 cm                       Ao root diam: 2.6 cm  LA/Ao: 1.4                                             LVLd %diff: -2.0 %                                             LVLs %diff: 0.83 %                                             EF(MOD-bp): 57.8 %  LVMI(BSA): 71.8 grams/m2                   LVMI(Height): 33.5  RWT(MM): 0.44     Doppler Measurements & Calculations  Ao V2 max: 106.2 cm/sec                LV V1 max: 75.4 cm/sec  Ao max P.5 mmHg                    LV V1 max P.3 mmHg  TV V2 max: 159.8 cm/sec                PA V2 max: 118.9 cm/sec  TV max PG: 10.2 mmHg                   PA max P.7 mmHg  TV mean P.4 mmHg  TV V2 VTI: 57.6 cm  RV V1 max: 101.1 cm/sec  RV V1 max P.1 mmHg     desc Ao max jaylen: 96.0 cm/sec              MPA max jaylen: 95.1 cm/sec  desc Ao max PG: 3.7 mmHg                  MPA max PG: 3.6 mmHg     [place] 2D Z-SCORE VALUES  Measurement Name Value Z-ScorePredictedNormal Range  Ao sinus diam(2D)3.3 cm  Ao ST Jx Diam(2D)3.0 cm  asc Aorta(2D)    3.0 cm     [place] Z-Scores (Measurements &  Calculations)  Measurement NameValue      Z-ScorePredictedNormal Range  IVSd(MM)        1.0 cm     -0.28  1.1      0.75 - 1.42  LVIDd(MM)       4.5 cm     -2.2   5.4      4.6 - 6.3  LVIDs(MM)       2.7 cm     -2.0   3.5      2.7 - 4.3  LVPWd(MM)       1.00 cm    -0.11  1.0      0.74 - 1.29  LV mass(C)d(MM) 158.9 grams-1.7   226.5    152.2 - 337.0  FS(MM)          39.8 %     1.3    34.5     27.6 - 43.1     Report approved by: Kalee Babin 04/28/2023 08:30 AM               In summary, Xavi is a 32 year old with Ebsteins anomaly s/p tricuspid valve replacement followed by transcatheter TV valve in 2019. Now with intermittent atrial tachycardia and poor exercise tolerance. 2017 cath suggests overall low cardiac output with mildly diminished function of both left and right ventricles    Recommendations:  1. Please wear zio for 7 days- will follow up on results  2. START spironolactone 25mg daily  3. Please go to the dentist, you need premedication with amoxicillin 2 grams one hour prior to dental cleanings.   4. Increase daily exercise    Follow up with Dr. Balderas in 6 months with a CPX and cardiac MRI prior. Dr. Engle follow up based on zio results.     Thank you for the opportunity to participate in Xavi's care.  I asked to see him back post ablation with an echocardiogram to assess ventricular function. Please do not hesitate to call with questions or concerns.    A total of 30minutes were spent in personal review of testing, including echo and labs, review of documented history and interval events in chart, face to face visit with discussion of findings and plan and  care coordination.     Sincerely,    Karthik Balderas M.D.   of Pediatrics  Pediatric and Adult Congenital Cardiology  [facility]  Pediatric Cardiology Office [phone]  Adult Congenital Cardiology Triage and Scheduling  [phone]      CC:  Xavi Mtz

## 2023-04-28 NOTE — PATIENT INSTRUCTIONS
You were seen today in the Adult Congenital and Cardiovascular Genetics Clinic at the [facility].    Cardiology Providers you saw during your visit:  Karthik Balderas MD and Ventura Engle MD    Diagnosis: Ebstein's    Results:  Karthik Balderas MD and Ventura Engle MD reviewed the results of your EKG and echo testing today in clinic.    Recommendations for you:    Please wear zio for 7 days- will follow up on results  START spironolactone 25mg daily  Please go to the dentist  Increase daily exercise      General Cardiac Recommendations:  Continue to eat a heart healthy, low salt diet.  Continue to get 20-30 minutes of aerobic activity, 4-5 days per week.  Examples of aerobic activity include walking, running, swimming, cycling, etc.  Continue to observe good oral hygiene, with regular dental visits.      SBE prophylaxis:   Yes__X__  No____    If YES is checked, follow the recommendations outlined below:  Take antibiotic(s) prior to recommended dental procedures and procedures on the respiratory tract or with infected skin, muscle or bones. SBE prophylaxis is not needed for routine GI and  procedures (ie. Colonoscopy or vaginal delivery)  Observe good oral hygiene daily, as advised by your dentist. Get regular professional dental care.  Keep cuts clean.  Infections should be treated promptly.  Symptoms of Infective Endocarditis could include: fever lasting more than 4-5 days or a recurrent fever that initially resolves but returns within 1-2 days)      Exercise restrictions:   Yes__X__  No____         If yes, list restrictions:  Must be allowed to rest if fatigued or SOB      FASTING CHOLESTEROL was checked in the last 5 years YES_X__  NO___ (2022)  If no, please follow up with your primary care physician. You should have a cholesterol screening every 5 years.      Follow-up: Follow up with Dr. Balderas in 6 months with a CPX and cardiac MRI prior. Dr. Engle follow up based on zio results.     If you have questions  or concerns please contact us at:    Emily Morillo, MPH, RN, BSN   Madeline Jimenez (Scheduling)  Nurse Care Coordinator     Clinic   Adult Congenital and CV Genetics   Adult Congenital and CV Genetic  [facility]   [facility]  (P) [phone]     (P) [phone]  [email or web address]  (F) [phone]        For after hours urgent needs, call [phone] and ask to speak to the Adult Congenital Physician on call.  Mention Job Code 0401.    For emergencies call 911.    [facility]  Mail Code [id number]  [address] [place] [address]    CardioPulmonary Stress Test (CPX)  CPX is a maximal (meaning you exercise to exhaustion, not to achieve a heart rate) exercise test where we measure how well your heart/body uses oxygen or energy. It is the gold standard for measuring functional capacity and helps us differentiate limitations due to lungs, heart, or fitness.   A CPX is NOT a typical stress test. You will NOT be asked to hold your Beta Blocker medication.   Follow these instructions:    1. Report to the GOLD waiting room in the [facility] hospital.  2. Nothing to eat for 3 hours prior to your test. You may have clear liquids up to the time of your test.  3. Please wear loose, two-piece clothing and comfortable, rubber-soled shoes for walking.       What happens during this test?   We will place a blood pressure cuff on your arm. We will also attach small pads to your chest. The pads are hooked to an EKG (electrocardiogram) machine that shows how your heart is working.  You will walk on a treadmill or pedal a bicycle.  You will breathe through a mouthpiece, and the air you breathe out will be measured at rest and during exercise.  We will watch your EKG, heart rate and heart rhythm the entire time.  We will check your blood pressure during the  test.  This test takes two (2) hours.    Cardiac MRI  Magnetic resonance imaging (MRI) is a test that lets your doctor see detailed pictures of the inside of your body. MRI combines the use of strong magnets and radio waves to form an MRI image. A Cardiac MRI will give a detailed image of your heart.  Follow these instructions:  1. Please no eating or drinking 3 hours prior to the procedure.   2. No caffeine, alcohol or tobacco 12 hours prior to the procedure.    During the procedure:  Please arrive to the Gold Waiting Room in the [facility].   You will be required to lay flat and follow breath-hold instructions.   You will need to remove all metal and answer a safety questionnaire. Please wear clothes without metal (snaps and zippers). Please remove any body piercings and hair extensions before you arrive. You will also remove watches, jewelry, hairpins, wallets, dentures, partial dental plates and hearing aids. You may wear contact lenses, and you may be able to wear your rings. We have a safe place to keep your personal items, but it is safer to leave them at home.  You will be given IV contrast for this exam.  To prepare:  The day before the exam drink extra fluid - at least six 8oz glasses (unless you are on a fluid restriction per your doctor)

## 2023-04-28 NOTE — LETTER
4/28/2023      RE: Xavi Mtz  [address] [facility] [id number]       Dear Colleague,    Thank you for the opportunity to participate in the care of your patient, Xavi Mtz, at the [facility] at [facility]. Please see a copy of my visit note below.    Adult Congenital Cardiology Visit     Patient:  Xavi Mtz MRN:  [id number]   YOB: 1990 Age:  32 year old   Date of Visit:  Apr 28, 2023 PCP:  Hamzah Atkins MD      Dear Dr. Atkins,      I had the pleasure of seeing  Xavi Mtz at the [facility] on Apr 28, 2023.   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 [facility] Dr. Engle from  for management of his Ebsteins anomaly and recurrent atrial tachycardia. Xavi reports that he has had very poor exercise tolerance for the last 10 years. He is not able to play outside for any length of time with his 8 year old son. He is working but fatigued with intermittent palpitations. . No LOC, edema, orthopnea.  HE presented in intermittent atrial tachycardia in November 2022 and is on eliquis, diltiazem, Toprol XL and ASA. Farzad underwent aflutter ablation in January 2023.        Xavi reports that he continues to feel his chest pounding - can last minutes to hours. He gets lightheaded and dizzy. His exercise tolerance is reduced and he struggles with a single flight of stairs. No edema, LOC, chest pain. DOes have shortness of breath.         Past medical history:    Born with Ebsteins anomaly  S/p bioprosthetic tricuspid valve replacement 1999 [facility]  Tricuspid valve balloon valvuloplasty 2017 [facility]  Cardiac Cath 2017 with RVEDP 13, LVEDP 12 PAP 24/9 CI 1.92 L/min/m2.   Placement of a 29 mm Sloan 3  transcatheter valve in tricuspid position with empiric ablation of  IVC/TV in 12/2019  A Flutter Ablation JAn 2023 ([facility])  Tobacco use- ongoing        Past Medical History        Past Medical History:   Diagnosis Date    Congenital heart disease       Ebstein's Anomaly            Current Outpatient Prescriptions          Current Outpatient Medications   Medication Sig Dispense Refill    amoxicillin (AMOXIL) 500 MG capsule Take 4 capsules (2,000 mg) by mouth once as needed (one hour prior to dental procedures.) 4 capsule 3    apixaban ANTICOAGULANT (ELIQUIS) 5 MG tablet 5 mg twice a day for 60 days. 60 tablet 1    aspirin (ASA) 81 MG tablet Take 1 tablet (81 mg) by mouth daily 90 tablet 3    diltiazem ER COATED BEADS (CARDIZEM CD/CARTIA XT) 180 MG 24 hr capsule Take 1 capsule (180 mg) by mouth every morning (before breakfast) 90 capsule 3    multivitamin w/minerals (MULTI-VITAMIN) tablet Take 1 tablet by mouth daily        metoprolol succinate ER (TOPROL XL) 50 MG 24 hr tablet Take 1 tablet (50 mg) by mouth daily (Patient not taking: Reported on 4/28/2023) 90 tablet 3                  Allergies   Allergen Reactions    Ceclor [Cefaclor]         Mouth ulcers            Family History:   Family History         Family History   Problem Relation Age of Onset    Diabetes Mother                 Social history: 10 yo step son,  9 yo son healthy, no cardiac disease, expecting second child this spring.   Social History            Occupational History    Not on file   Tobacco Use    Smoking status: Some Days       Packs/day: 0.50       Years: 10.00       Pack years: 5.00       Types: Cigarettes    Smokeless tobacco: Current       Types: Chew    Tobacco comments:        Smokes E-Cigs   Vaping Use    Vaping status: Not on file   Substance and Sexual Activity    Alcohol use: No       Alcohol/week: 0.0 standard drinks of alcohol    Drug use: No    Sexual activity: Not on file         Marital Status:         Review of  "Systems: A comprehensive review of systems was performed and is negative, except as noted in the HPI and PMH  Review of Systems      Physical exam: Vitals: /83 (BP Location: Right arm, Patient Position: Sitting, Cuff Size: Adult Large)   Pulse 82   Ht 1.791 m (5' 10.51\")   Wt 100.1 kg (220 lb 11.2 oz)   SpO2 98%   BMI 31.21 kg/m    BMI= Body mass index is 31.21 kg/m .   There is no central or peripheral cyanosis. Pupils are reactive and sclera are not jaundiced. There is no conjunctival injection or discharge. EOMI. Mucous membranes are moist and pink.   Lungs are clear to ausculation bilaterally with no wheezes, rales or rhonchi. There is no increased work of breathing, retractions or nasal flaring. Precordium is quiet with a normally placed apical impulse. On auscultation, heart sounds are regular with an accentuated S1 and normal S2. There are no murmurs, rubs or gallops.  Abdomen is soft and non-tender without masses or hepatomegaly. Femoral pulses are normal with no brachial femoral delay.Skin is without rashes, lesions, or significant bruising. Extremities are warm and well-perfused with no cyanosis, clubbing or edema. Peripheral pulses are normal and there is < 2 sec capillary refill. Patient is alert and oriented and moves all extremities equally with normal tone.       A 12 lead EKG was performed today and revealed NSR at a rate of 78 there is RVE with RBBB qrs duration 150msec.   .   An echocardiogram performed today is notable for:  Tricuspid valve mean gradient stable at 5 mm mmHg. No signficant tricuspid valve insufficiency. There is moderate right ventricular enlargement with low normal systolic function. There is ventricular septal bouncing. Qualitatively low normal left ventricular systolic function. There is flow reversal in the  hepatic veins during atrial systole.            Results for orders placed or performed in visit on 04/28/23   EKG 12-lead, tracing only (Same Day)     Status: " None   Result Value Ref Range     Systolic Blood Pressure   mmHg     Diastolic Blood Pressure   mmHg     Ventricular Rate 78 BPM     Atrial Rate 78 BPM     AR Interval 186 ms     QRS Duration 154 ms      ms     QTc 490 ms     P Axis 19 degrees     R AXIS 64 degrees     T Axis 24 degrees     Interpretation ECG           Sinus rhythm  Right bundle branch block  Abnormal ECG  When compared with ECG of 2023 15:29,  No significant change was found  Confirmed by MD SHERLY, JOYCE () on 2023 12:50:14 PM      Results for orders placed or performed in visit on 23   Echo Congenital Adult     Status: None     Narrative     [id number]  [id number]  [id number]  [id number]^JAN^CHERRI^ROSALIA                                                               Study ID: [id number]                                                 [facility] [address]                                                Phone: [phone]                                Pediatric Echocardiogram  ______________________________________________________________________________  Name: BLADE MORFIN  Study Date: 2023 07:46 AM                     Patient Location: [facility]  MRN: [id number]                                     Age: 32 yrs  : 1990  Gender: Male  Patient Class: Outpatient                           Height: 178 cm  Ordering Provider: CHERRI MONTOYA                Weight: 97 kg  Referring Provider: CHERRI MONTOYA               BSA: 2.1 m2  Performed By: Sarah Benavides  Report approved by: Raquel Li MD  Reason For Study: Ebstein's anomaly, SVT (supraventricular tachycardia) (H),  Atria  ______________________________________________________________________________  ##### CONCLUSIONS #####  Patient with  history of Ebstein's Anomaly, after 33 mm tricuspid valve  replacement. Percutaneous 25mm balloon valvuloplasty of the tricuspid valve  (7/27/17). Now after 29mm Sloan 3 transcatheter valve on 12/6/2019 at [facility].  Tricuspid valve mean gradient 5 mmHg. No signficant tricuspid valve  insufficiency. There is moderate right ventricular enlargement with low normal  systolic function. The calculated biplane left ventricular ejection fraction  is 58 %. There is ventricular septal bouncing. Qualitatively low normal left  ventricular systolic function. There is flow reversal in the hepatic veins  during atrial systole.     ______________________________________________________________________________  Technical information:  A complete two dimensional, MMODE, spectral and color Doppler transthoracic  echocardiogram is performed. Images are obtained from parasternal, apical,  subcostal and suprasternal notch views. Technically difficult study due to  poor acoustic windows. Prior echocardiogram available for comparison. ECG  tracing shows regular rhythm.     Segmental Anatomy:  There is normal atrial arrangement, with concordant atrioventricular and  ventriculoarterial connections.     Systemic and pulmonary veins:  The inferior vena cava drains normally to the right atrium. There is flow  reversal in the hepatic veins during atrial systole. Color flow demonstrates  flow from two pulmonary veins entering the left atrium.     Atria and atrial septum:  The left atrium is normal in size. There is moderate right atrial enlargement.  The atrial septum is not well visualized.     Atrioventricular valves:  Ebstein's anomaly of the tricuspid valve. Post tricuspid valve replacement  with a porcine prosthetic valve. Post percutaneous balloon valvuloplasty of  the tricuspid valve. Tricuspid valve mean gradient 5 mmHg. The mitral valve is  normal in appearance and motion. There is no mitral valve insufficiency.     Ventricles and  Ventricular Septum:  There is moderate right ventricular enlargement. Low normal right ventricular  systolic function. There is ventricular septal bouncing. Normal left  ventricular size. The left ventricular dimmensions measured by MMODE are  affected by the abnormal septal motion. There is mild flattening of the  ventricular septum in systole. The calculated biplane left ventricular  ejection fraction is 58 %.     Outflow tracts:  Normal great artery relationship. There is unobstructed flow through the right  ventricular outflow tract. The pulmonary valve motion is normal. There is  normal flow across the pulmonary valve. Trivial pulmonary valve insufficiency.  There is unobstructed flow through the left ventricular outflow tract.  Tricuspid aortic valve with normal appearance and motion. There is normal flow  across the aortic valve.     Great arteries:  The main pulmonary artery has normal appearance. There is unobstructed flow in  the main pulmonary artery. The pulmonary artery bifurcation is normal. There  is unobstructed flow in both branch pulmonary arteries. Normal ascending  aorta. The aortic arch appears normal. There is unobstructed antegrade flow in  the ascending, transverse arch, descending thoracic and abdominal aorta.     Arterial Shunts:  The ductal region is not imaged with this study.     Coronaries:  The coronary arteries are not evaluated.     Effusions, catheters, cannulas and leads:  No pericardial effusion.     MMode/2D Measurements & Calculations  LA dimension: 3.6 cm                       Ao root diam: 2.6 cm  LA/Ao: 1.4                                             LVLd %diff: -2.0 %                                             LVLs %diff: 0.83 %                                             EF(MOD-bp): 57.8 %  LVMI(BSA): 71.8 grams/m2                   LVMI(Height): 33.5  RWT(MM): 0.44     Doppler Measurements & Calculations  Ao V2 max: 106.2 cm/sec                LV V1 max: 75.4 cm/sec  Ao  max P.5 mmHg                    LV V1 max P.3 mmHg  TV V2 max: 159.8 cm/sec                PA V2 max: 118.9 cm/sec  TV max PG: 10.2 mmHg                   PA max P.7 mmHg  TV mean P.4 mmHg  TV V2 VTI: 57.6 cm  RV V1 max: 101.1 cm/sec  RV V1 max P.1 mmHg     desc Ao max jaylen: 96.0 cm/sec              MPA max jaylen: 95.1 cm/sec  desc Ao max PG: 3.7 mmHg                  MPA max PG: 3.6 mmHg     [place] 2D Z-SCORE VALUES  Measurement Name Value Z-ScorePredictedNormal Range  Ao sinus diam(2D)3.3 cm  Ao ST Jx Diam(2D)3.0 cm  asc Aorta(2D)    3.0 cm     Drew Z-Scores (Measurements & Calculations)  Measurement NameValue      Z-ScorePredictedNormal Range  IVSd(MM)        1.0 cm     -0.28  1.1      0.75 - 1.42  LVIDd(MM)       4.5 cm     -2.2   5.4      4.6 - 6.3  LVIDs(MM)       2.7 cm     -2.0   3.5      2.7 - 4.3  LVPWd(MM)       1.00 cm    -0.11  1.0      0.74 - 1.29  LV mass(C)d(MM) 158.9 grams-1.7   226.5    152.2 - 337.0  FS(MM)          39.8 %     1.3    34.5     27.6 - 43.1     Report approved by: Kalee Babin 2023 08:30 AM                  In summary, Xavi is a 32 year old with Ebsteins anomaly s/p tricuspid valve replacement followed by transcatheter TV valve in 2019. Now with intermittent atrial tachycardia and poor exercise tolerance. 2017 cath suggests overall low cardiac output with mildly diminished function of both left and right ventricles     Recommendations:  Please wear zio for 7 days- will follow up on results  START spironolactone 25mg daily  Please go to the dentist, you need premedication with amoxicillin 2 grams one hour prior to dental cleanings.   Increase daily exercise     Follow up with Dr. Balderas in 6 months with a CPX and cardiac MRI prior. Dr. Engle follow up based on zio results.      Thank you for the opportunity to participate in Xavi's care.  I asked to see him back post ablation with an echocardiogram to assess ventricular function. Please do not  hesitate to call with questions or concerns.     A total of 30minutes were spent in personal review of testing, including echo and labs, review of documented history and interval events in chart, face to face visit with discussion of findings and plan and  care coordination.      Sincerely,     Karthik Balderas M.D.   of Pediatrics  Pediatric and Adult Congenital Cardiology  [facility]  Pediatric Cardiology Office [phone]  Adult Congenital Cardiology Triage and Scheduling [phone]        CC:  Xavi Mtz

## 2023-04-30 DIAGNOSIS — T82.897D: ICD-10-CM

## 2023-04-30 RX ORDER — AMOXICILLIN 500 MG/1
2000 CAPSULE ORAL
Qty: 4 CAPSULE | Refills: 3 | Status: SHIPPED | OUTPATIENT
Start: 2023-04-30 | End: 2024-03-15

## 2023-05-24 ENCOUNTER — TELEPHONE (OUTPATIENT)
Dept: PEDIATRIC CARDIOLOGY | Facility: CLINIC | Age: 33
End: 2023-05-24
Payer: COMMERCIAL

## 2023-05-31 NOTE — TELEPHONE ENCOUNTER
LVM in regards to scheduling follow up with Sherrie with MRI and CPX prior.  MAX ATTEMPT to reach out, sent letter. If pt wishes to schedule call [phone]

## 2023-10-24 ENCOUNTER — ANCILLARY PROCEDURE (OUTPATIENT)
Dept: CARDIOLOGY | Facility: CLINIC | Age: 33
End: 2023-10-24
Payer: COMMERCIAL

## 2023-10-24 ENCOUNTER — HOSPITAL ENCOUNTER (OUTPATIENT)
Dept: CARDIOLOGY | Facility: CLINIC | Age: 33
Discharge: HOME | End: 2023-10-24
Payer: COMMERCIAL

## 2023-10-24 ENCOUNTER — OFFICE VISIT (OUTPATIENT)
Dept: CARDIOLOGY | Facility: CLINIC | Age: 33
End: 2023-10-24
Payer: COMMERCIAL

## 2023-10-24 ENCOUNTER — HOSPITAL ENCOUNTER (OUTPATIENT)
Dept: MRI IMAGING | Facility: CLINIC | Age: 33
Discharge: HOME | End: 2023-10-24
Payer: COMMERCIAL

## 2023-10-24 VITALS
HEART RATE: 81 BPM | SYSTOLIC BLOOD PRESSURE: 114 MMHG | BODY MASS INDEX: 31.67 KG/M2 | DIASTOLIC BLOOD PRESSURE: 69 MMHG | HEIGHT: 71 IN | OXYGEN SATURATION: 95 % | WEIGHT: 226.2 LBS

## 2023-10-24 VITALS — BODY MASS INDEX: 31.74 KG/M2 | WEIGHT: 224.43 LBS

## 2023-10-24 DIAGNOSIS — I48.92 ATRIAL FLUTTER, UNSPECIFIED TYPE (H): ICD-10-CM

## 2023-10-24 DIAGNOSIS — I47.10 SVT (SUPRAVENTRICULAR TACHYCARDIA) (H): ICD-10-CM

## 2023-10-24 DIAGNOSIS — Q22.5 EBSTEIN'S ANOMALY: ICD-10-CM

## 2023-10-24 PROCEDURE — 93242 EXT ECG>48HR<7D RECORDING: CPT

## 2023-10-24 PROCEDURE — A9585 GADOBUTROL INJECTION: HCPCS | Mod: JZ

## 2023-10-24 PROCEDURE — 99215 OFFICE O/P EST HI 40 MIN: CPT

## 2023-10-24 PROCEDURE — 255N000002 HC RX 255 OP 636: Mod: JZ

## 2023-10-24 PROCEDURE — 94621 CARDIOPULM EXERCISE TESTING: CPT

## 2023-10-24 PROCEDURE — 94621 CARDIOPULM EXERCISE TESTING: CPT | Mod: 26 | Performed by: INTERNAL MEDICINE

## 2023-10-24 PROCEDURE — 99213 OFFICE O/P EST LOW 20 MIN: CPT | Mod: 25

## 2023-10-24 PROCEDURE — 75561 CARDIAC MRI FOR MORPH W/DYE: CPT | Mod: 26 | Performed by: INTERNAL MEDICINE

## 2023-10-24 PROCEDURE — 93244 EXT ECG>48HR<7D REV&INTERPJ: CPT | Performed by: INTERNAL MEDICINE

## 2023-10-24 PROCEDURE — 75565 CARD MRI VELOC FLOW MAPPING: CPT | Mod: 26 | Performed by: INTERNAL MEDICINE

## 2023-10-24 PROCEDURE — 75565 CARD MRI VELOC FLOW MAPPING: CPT

## 2023-10-24 RX ORDER — METOPROLOL SUCCINATE 50 MG/1
50 TABLET, EXTENDED RELEASE ORAL
COMMUNITY
Start: 2023-07-27 | End: 2023-12-22

## 2023-10-24 RX ORDER — GADOBUTROL 604.72 MG/ML
12 INJECTION INTRAVENOUS ONCE
Status: COMPLETED | OUTPATIENT
Start: 2023-10-24 | End: 2023-10-24

## 2023-10-24 RX ADMIN — GADOBUTROL 12 ML: 604.72 INJECTION INTRAVENOUS at 10:32

## 2023-10-24 ASSESSMENT — PAIN SCALES - GENERAL: PAINLEVEL: NO PAIN (0)

## 2023-10-24 NOTE — LETTER
10/24/2023      RE: Xavi Mtz  [address] [facility] [address]       Dear Colleague,    Thank you for the opportunity to participate in the care of your patient, Xavi Mtz, at the [facility] [place] at [facility]. Please see a copy of my visit note below.    Adult Congenital Cardiology Visit    Patient:  Xavi Mtz MRN:  [id number]   YOB: 1990 Age:  33 year old   Date of Visit:  Oct 24, 2023 PCP:  Hamzah Atkins MD     Dear Dr. Atkins,     I had the pleasure of seeing your patient Xavi Mtz at the [facility] Adult Congenital and Cardiovascular Genetics Clinic on Oct 24, 2023.   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 He is here today for scheduled follow up. He also sees Dr. Engle from EP for management of recurrent atrial tachycardia. [place] Ablation January 2023. Xavi reports that he has had very poor exercise tolerance for the last 10 years. He is not able to play outside for any length of time with his children. He feels lightheaded and dizzy, both with positional changes and randomly. He has frequent palpitations and feelings of a racing heart beat. No LOC, edema, orthopnea.  He sees the dentist regularly and uses amoxicillin for premedication (lifelong due to bioprosthetic valve)    Past medical history:    Born with Ebsteins anomaly  S/p bioprosthetic tricuspid valve replacement 1999 [facility]  Tricuspid valve balloon valvuloplasty 2017 [facility]  Cardiac Cath 2017 with RVEDP 13, LVEDP 12 PAP 24/9 CI 1.92 L/min/m2.   Placement of a 29 mm Sloan 3 transcatheter valve in tricuspid position with empiric ablation of  IVC/TV in 12/2019  A flutter ablation Jan 2023 ([facility])  Tobacco use- ongoing      Past Medical History:   Diagnosis Date    Congenital heart disease     Ebstein's  Anomaly       Current Outpatient Medications   Medication Sig Dispense Refill    amoxicillin (AMOXIL) 500 MG capsule Take 4 capsules (2,000 mg) by mouth once as needed (one hour prior to dental procedures.) 4 capsule 3    aspirin (ASA) 81 MG tablet Take 1 tablet (81 mg) by mouth daily 90 tablet 3    diltiazem ER COATED BEADS (CARDIZEM CD/CARTIA XT) 180 MG 24 hr capsule Take 1 capsule (180 mg) by mouth every morning (before breakfast) 90 capsule 3    metoprolol succinate ER (TOPROL XL) 50 MG 24 hr tablet 50 mg      spironolactone (ALDACTONE) 25 MG tablet Take 1 tablet (25 mg) by mouth daily 90 tablet 3    apixaban ANTICOAGULANT (ELIQUIS) 5 MG tablet 5 mg twice a day for 60 days. (Patient not taking: Reported on 10/24/2023) 60 tablet 1    multivitamin w/minerals (MULTI-VITAMIN) tablet Take 1 tablet by mouth daily (Patient not taking: Reported on 10/24/2023)         Allergies   Allergen Reactions    Ceclor [Cefaclor]      Mouth ulcers         Family History:   Family History   Problem Relation Age of Onset    Diabetes Mother           Social history:  3 children. 10 yo stepson, 8 yo son with ex wife 5 month old baby girl. Healthy, no cardiac disease,    Social History     Occupational History    Not on file   Tobacco Use    Smoking status: Every Day     Packs/day: 0.50     Years: 10.00     Additional pack years: 0.00     Total pack years: 5.00     Types: Cigarettes, Vaping Device    Smokeless tobacco: Current     Types: Chew    Tobacco comments:      Smokes E-Cigs   Substance and Sexual Activity    Alcohol use: No     Alcohol/week: 0.0 standard drinks of alcohol    Drug use: No    Sexual activity: Not on file       Marital Status:  has female SO      Review of Systems: A comprehensive review of systems was performed and is negative, except as noted in the HPI and PMH  Review of Systems     Physical exam: Vitals: /69 (BP Location: Right arm, Patient Position: Sitting, Cuff Size: Adult Regular)   Pulse 81   " Ht 1.801 m (5' 10.91\")   Wt 102.6 kg (226 lb 3.2 oz)   SpO2 95%   BMI 31.63 kg/m    BMI= Body mass index is 31.63 kg/m .   There is no central or peripheral cyanosis. Pupils are reactive and sclera are not jaundiced. There is no conjunctival injection or discharge. EOMI. Mucous membranes are moist and pink.   Lungs are clear to ausculation bilaterally with no wheezes, rales or rhonchi. There is no increased work of breathing, retractions or nasal flaring. Precordium is quiet with a normally placed apical impulse. On auscultation, heart sounds are regular with an accentuated S1 and normal S2. There are no murmurs, rubs or gallops.  Abdomen is soft and non-tender without masses or hepatomegaly. Femoral pulses are normal with no brachial femoral delay.Skin is without rashes, lesions, or significant bruising. Extremities are warm and well-perfused with no cyanosis, clubbing or edema. Peripheral pulses are normal and there is < 2 sec capillary refill. Patient is alert and oriented and moves all extremities equally with normal tone.     An echocardiogram performed today is notable for:  Tricuspid valve mean gradient 6 mmHg. No signficant tricuspid valve  insufficiency. There is moderate right ventricular enlargement with low normal systolic function. There is ventricular septal bouncing. Qualitatively low normal left ventricular systolic function. There is flow reversal in the  hepatic veins during atrial systole.    CPX: April 2023  Peak VO2 (ml/kg/min) VE/VCO2  [place] RER   20.29        42.90        1.24            Predicted VO2 (ml/kg/min) Predicted VO2 %   39.10        52            Resting Supine BP (mmHg) Resting Standing BP (mmHg) Final Stress BP (mmHg)   113/68        119/74        188/82          Resting Supine HR (bpm) Resting Standing HR (bpm)    62        71             Max HR (bpm) Max Predicted HR (bpm) Max Perdicted HR %   141        187        75            Exercise time (min) Exercise time (sec) " Estimated workload (METS)   6        27        5.8            A cardiopulmonary stress test was performed following a Lv protocol with the patient exercising for 6 minutes and 27 seconds under the supervision of Dr. Kc.  Blood pressure demonstrated a normal response to exercise.  Heart rate demonstrated a blunted response to exercise.    The stress electrocardiogram is negative for ischemia at the heart rate achieved.  The negative predictive value of the examination is reduced due to inability to achieve target HR.    The patient's exercise capacity is severely impaired.    A prior study was conducted on 10/9/2019. This study has changes noted with the prior study. This represents a 29.3% decrease in functional capacity.     The patient reported 7-8/10 shortness of breath and 5/10 light-headedness at peak exercise which resolved during recovery. During recovery, the patient had a 9 beat run of SVT.     ECG Summary    ECG Baseline electrocardiogram demonstrates sinus rhythm and right bundle branch block.   The stress electrocardiogram is negative for ischemia at the heart rate achieved.  The negative predictive value of the examination is reduced due to inability to achieve target HR. Arrhythmias during stress: Occasional PVCs. Arrhythmias during recovery: Supraventricular tachycardia for 9 beats.  Occasional PACs.     Technical Comments    Cardiac Protocol A cardiopulmonary stress test was performed following a Lv protocol with the patient exercising for 6 minutes and 27 seconds under the supervision of Dr. Kc.  Blood pressure demonstrated a normal response to exercise.  Heart rate demonstrated a blunted response to exercise.  The patient's exercise capacity is severely impaired.  The test was stopped due to patient request, dyspnea and light-headedness.  The patient reported dyspnea and light-headedness during the stress test.  Functional capacity response is Class II: 50-74%. The patient experienced  no angina during the test.     Ventilatory    The baseline spirometry revealed normal lung volumes. The patient's oxygen saturation remained above 99% throughout testing.       MRA:  Clinical history: 32 yo Ebstein's anomaly, prosthetic TV 1999, valvuloplasty 2017, transcatheter  valve-in-valve 12/2019, ASD repair, atrial flutter ablation 2019 and atrial tachy ablation 1/2023.      Comparison CMR: None     1. The LV is normal in cavity size and wall thickness. The global systolic function is normal. The LVEF is  58%. There are no regional wall motion abnormalities. There is a prominent septal bounce with systolic  flattening of the septum.     2. The RV is  mild-moderately dilated. The global systolic function is moderately reduced. The RVEF is 34%.      3. The right atrium is moderately dilated.  The left atrium is normal in size. Prior ASD repair, no  residual shunt noted.     4. Transcatheter prosthetic tricuspid valve-in-valve is well seated, cannot assess further due to  associated artifact. No other valve disease noted.     5. Late gadolinium enhancement imaging shows no MI, fibrosis or infiltrative disease.      6. There is no pericardial effusion or thickening.     7. There is no intracardiac thrombus.     CONCLUSIONS: Ebstein's anomaly with moderately reduced RV function, RVEF 34%, LVEF 58%. No myocardial  Fibrosis.    Last Zio -  No sustained arrhythmias.     Echo April 2023  Patient with history of Ebstein's Anomaly, after 33 mm tricuspid valve  replacement. Percutaneous 25mm balloon valvuloplasty of the tricuspid valve  (7/27/17). Now after 29mm Sloan 3 transcatheter valve on 12/6/2019 at [place].  Tricuspid valve mean gradient 5 mmHg. No signficant tricuspid valve  insufficiency. There is moderate right ventricular enlargement with low normal  systolic function. The calculated biplane left ventricular ejection fraction  is 58 %. There is ventricular septal bouncing. Qualitatively low normal  left  ventricular systolic function. There is flow reversal in the hepatic veins  during atrial systole.       In summary, Xavi is a 33 year old with Ebsteins anomaly s/p tricuspid valve replacement followed by transcatheter TV valve in 2019. Now with intermittent atrial tachycardia and poor exercise tolerance. 2017 cath suggests overall low cardiac output with mildly diminished function of both left and right ventricles R> L.    Recommendations:  Continue current medications  ACHD discussion for possible surgical intervention. He may be a candidate for Jemal shunt to offload his right ventricle if he does poorly with this testing.  Cardiac Cath when available after first of the year -  R/L and coronary arteries.   Continue getting exercise as possible.  Let us know if new or worsening symptoms.      Thank you for the opportunity to participate in Xavi's care.  We will see him back after heart catheterization and ACHD discussion, sooner of more concerns.     A total of 45 minutes were spent in personal review of testing, including echo and labs, review of documented history and interval events in chart, face to face visit with discussion of findings and plan and  care coordination and documentation.     Sincerely,    Karthik Balderas M.D.   of Pediatrics  Pediatric and Adult Congenital Cardiology  [facility]  Pediatric Cardiology Office [phone]  Adult Congenital Cardiology Triage and Scheduling [phone]      CC:  Xavi Mtz        Please do not hesitate to contact me if you have any questions/concerns.     Sincerely,     Karthik Balderas MD

## 2023-10-24 NOTE — LETTER
10/24/2023       RE: Xavi Mtz  [address] [facility] [address]     Dear Colleague,    Thank you for referring your patient, Xavi Mtz, to the [facility] [place] at [facility]. Please see a copy of my visit note below.    Adult Congenital Cardiology Visit    Patient:  Xavi Mtz MRN:  [id number]   YOB: 1990 Age:  33 year old   Date of Visit:  Oct 24, 2023 PCP:  Hamzah Atkins MD     Dear Dr. Atkins,     I had the pleasure of seeing your patient Xavi Mtz at the [facility] Adult Congenital and Cardiovascular Genetics Clinic on Oct 24, 2023.   Xavi is a 32 yo male with Ebsteins anomaly s/p tricuspid valve replacement in 2019 who presented with atrial tachycardiac in November 2022. He is here today for scheduled follow up. He also sees Dr. Engle from  for management of recurrent atrial tachycardia. [place] Abl[place] January 2023. Xavi reports that he has had very poor exercise tolerance for the last 10 years. He is not able to play outside for any length of time with his children. He feels lightheaded and dizzy, both with positional changes and randomly. He has frequent palpitations and feelings of a racing heart beat. No LOC, edema, orthopnea.  He sees the dentist regularly and uses amoxicillin for premedication (lifelong due to bioprosthetic valve)    Past medical history:    Born with Ebsteins anomaly  S/p bioprosthetic tricuspid valve replacement 1999 [facility]  Tricuspid valve balloon valvuloplasty 2017 [facility]  Cardiac Cath 2017 with RVEDP 13, LVEDP 12 PAP 24/9 CI 1.92 L/min/m2.   Placement of a 29 mm Sloan 3 transcatheter valve in tricuspid position with empiric ablation of  IVC/TV in 12/2019  A flutter ablation Jan 2023 ([facility])  Tobacco use- ongoing      Past Medical History:   Diagnosis Date    Congenital heart disease     Ebstein's Anomaly       Current Outpatient  "Medications   Medication Sig Dispense Refill    amoxicillin (AMOXIL) 500 MG capsule Take 4 capsules (2,000 mg) by mouth once as needed (one hour prior to dental procedures.) 4 capsule 3    aspirin (ASA) 81 MG tablet Take 1 tablet (81 mg) by mouth daily 90 tablet 3    diltiazem ER COATED BEADS (CARDIZEM CD/CARTIA XT) 180 MG 24 hr capsule Take 1 capsule (180 mg) by mouth every morning (before breakfast) 90 capsule 3    metoprolol succinate ER (TOPROL XL) 50 MG 24 hr tablet 50 mg      spironolactone (ALDACTONE) 25 MG tablet Take 1 tablet (25 mg) by mouth daily 90 tablet 3    apixaban ANTICOAGULANT (ELIQUIS) 5 MG tablet 5 mg twice a day for 60 days. (Patient not taking: Reported on 10/24/2023) 60 tablet 1    multivitamin w/minerals (MULTI-VITAMIN) tablet Take 1 tablet by mouth daily (Patient not taking: Reported on 10/24/2023)         Allergies   Allergen Reactions    Ceclor [Cefaclor]      Mouth ulcers         Family History:   Family History   Problem Relation Age of Onset    Diabetes Mother           Social history:  3 children. 10 yo stepson, 8 yo son with ex wife 5 month old baby girl. Healthy, no cardiac disease,    Social History     Occupational History    Not on file   Tobacco Use    Smoking status: Every Day     Packs/day: 0.50     Years: 10.00     Additional pack years: 0.00     Total pack years: 5.00     Types: Cigarettes, Vaping Device    Smokeless tobacco: Current     Types: Chew    Tobacco comments:      Smokes E-Cigs   Substance and Sexual Activity    Alcohol use: No     Alcohol/week: 0.0 standard drinks of alcohol    Drug use: No    Sexual activity: Not on file       Marital Status:  has female SO      Review of Systems: A comprehensive review of systems was performed and is negative, except as noted in the HPI and PMH  Review of Systems     Physical exam: Vitals: /69 (BP Location: Right arm, Patient Position: Sitting, Cuff Size: Adult Regular)   Pulse 81   Ht 1.801 m (5' 10.91\")   Wt " 102.6 kg (226 lb 3.2 oz)   SpO2 95%   BMI 31.63 kg/m    BMI= Body mass index is 31.63 kg/m .   There is no central or peripheral cyanosis. Pupils are reactive and sclera are not jaundiced. There is no conjunctival injection or discharge. EOMI. Mucous membranes are moist and pink.   Lungs are clear to ausculation bilaterally with no wheezes, rales or rhonchi. There is no increased work of breathing, retractions or nasal flaring. Precordium is quiet with a normally placed apical impulse. On auscultation, heart sounds are regular with an accentuated S1 and normal S2. There are no murmurs, rubs or gallops.  Abdomen is soft and non-tender without masses or hepatomegaly. Femoral pulses are normal with no brachial femoral delay.Skin is without rashes, lesions, or significant bruising. Extremities are warm and well-perfused with no cyanosis, clubbing or edema. Peripheral pulses are normal and there is < 2 sec capillary refill. Patient is alert and oriented and moves all extremities equally with normal tone.     An echocardiogram performed today is notable for:  Tricuspid valve mean gradient 6 mmHg. No signficant tricuspid valve  insufficiency. There is moderate right ventricular enlargement with low normal systolic function. There is ventricular septal bouncing. Qualitatively low normal left ventricular systolic function. There is flow reversal in the  hepatic veins during atrial systole.    CPX: April 2023  Peak VO2 (ml/kg/min) VE/VCO2  [place] RER   20.29        42.90        1.24            Predicted VO2 (ml/kg/min) Predicted VO2 %   39.10        52            Resting Supine BP (mmHg) Resting Standing BP (mmHg) Final Stress BP (mmHg)   113/68        119/74        188/82          Resting Supine HR (bpm) Resting Standing HR (bpm)    62        71             Max HR (bpm) Max Predicted HR (bpm) Max Perdicted HR %   141        187        75            Exercise time (min) Exercise time (sec) Estimated workload (METS)   6         27        5.8            A cardiopulmonary stress test was performed following a Lv protocol with the patient exercising for 6 minutes and 27 seconds under the supervision of Dr. Kc.  Blood pressure demonstrated a normal response to exercise.  Heart rate demonstrated a blunted response to exercise.    The stress electrocardiogram is negative for ischemia at the heart rate achieved.  The negative predictive value of the examination is reduced due to inability to achieve target HR.    The patient's exercise capacity is severely impaired.    A prior study was conducted on 10/9/2019. This study has changes noted with the prior study. This represents a 29.3% decrease in functional capacity.     The patient reported 7-8/10 shortness of breath and 5/10 light-headedness at peak exercise which resolved during recovery. During recovery, the patient had a 9 beat run of SVT.     ECG Summary    ECG Baseline electrocardiogram demonstrates sinus rhythm and right bundle branch block.   The stress electrocardiogram is negative for ischemia at the heart rate achieved.  The negative predictive value of the examination is reduced due to inability to achieve target HR. Arrhythmias during stress: Occasional PVCs. Arrhythmias during recovery: Supraventricular tachycardia for 9 beats.  Occasional PACs.     Technical Comments    Cardiac Protocol A cardiopulmonary stress test was performed following a Lv protocol with the patient exercising for 6 minutes and 27 seconds under the supervision of Dr. Kc.  Blood pressure demonstrated a normal response to exercise.  Heart rate demonstrated a blunted response to exercise.  The patient's exercise capacity is severely impaired.  The test was stopped due to patient request, dyspnea and light-headedness.  The patient reported dyspnea and light-headedness during the stress test.  Functional capacity response is Class II: 50-74%. The patient experienced no angina during the test.      Ventilatory    The baseline spirometry revealed normal lung volumes. The patient's oxygen saturation remained above 99% throughout testing.       MRA:  Clinical history: 34 yo Ebstein's anomaly, prosthetic TV 1999, valvuloplasty 2017, transcatheter  valve-in-valve 12/2019, ASD repair, atrial flutter ablation 2019 and atrial tachy ablation 1/2023.      Comparison CMR: None     1. The LV is normal in cavity size and wall thickness. The global systolic function is normal. The LVEF is  58%. There are no regional wall motion abnormalities. There is a prominent septal bounce with systolic  flattening of the septum.     2. The RV is  mild-moderately dilated. The global systolic function is moderately reduced. The RVEF is 34%.      3. The right atrium is moderately dilated.  The left atrium is normal in size. Prior ASD repair, no  residual shunt noted.     4. Transcatheter prosthetic tricuspid valve-in-valve is well seated, cannot assess further due to  associated artifact. No other valve disease noted.     5. Late gadolinium enhancement imaging shows no MI, fibrosis or infiltrative disease.      6. There is no pericardial effusion or thickening.     7. There is no intracardiac thrombus.     CONCLUSIONS: Ebstein's anomaly with moderately reduced RV function, RVEF 34%, LVEF 58%. No myocardial  Fibrosis.    Last Zio -  No sustained arrhythmias.     Echo April 2023  Patient with history of Ebstein's Anomaly, after 33 mm tricuspid valve  replacement. Percutaneous 25mm balloon valvuloplasty of the tricuspid valve  (7/27/17). Now after 29mm Sloan 3 transcatheter valve on 12/6/2019 at [place].  Tricuspid valve mean gradient 5 mmHg. No signficant tricuspid valve  insufficiency. There is moderate right ventricular enlargement with low normal  systolic function. The calculated biplane left ventricular ejection fraction  is 58 %. There is ventricular septal bouncing. Qualitatively low normal left  ventricular systolic function.  There is flow reversal in the hepatic veins  during atrial systole.       In summary, Xavi is a 33 year old with Ebsteins anomaly s/p tricuspid valve replacement followed by transcatheter TV valve in 2019. Now with intermittent atrial tachycardia and poor exercise tolerance. 2017 cath suggests overall low cardiac output with mildly diminished function of both left and right ventricles R> L.    Recommendations:  Continue current medications  ACHD discussion for possible surgical intervention. He may be a candidate for Jemal shunt to offload his right ventricle if he does poorly with this testing.  Cardiac Cath when available after first of the year -  R/L and coronary arteries.   Continue getting exercise as possible.  Let us know if new or worsening symptoms.      Thank you for the opportunity to participate in Xavi's care.  We will see him back after heart catheterization and ACHD discussion, sooner of more concerns.     A total of 45 minutes were spent in personal review of testing, including echo and labs, review of documented history and interval events in chart, face to face visit with discussion of findings and plan and  care coordination and documentation.     Sincerely,    Karthik Balderas M.D.   of Pediatrics  Pediatric and Adult Congenital Cardiology  [facility]  Pediatric Cardiology Office [phone]  Adult Congenital Cardiology Triage and Scheduling [phone]      CC:  Xavi Mtz

## 2023-10-24 NOTE — NURSING NOTE
Chief Complaint   Patient presents with    Follow Up     Return congenital heart - 33 year old male with history of Ebstein's anomaly        Vitals were taken, medications reconciled.    Teresa Jay, TRISTEN  1:10 PM

## 2023-10-24 NOTE — PATIENT INSTRUCTIONS
You were seen today in the Adult Congenital and Cardiovascular Genetics Clinic at the [facility].    Cardiology Providers you saw during your visit:  Karthik Balderas MD    Diagnosis:  Ebstein's    Results:  Karthik Balderas MD reviewed the results of your cMRI and CPX testing today in clinic.    Recommendations for you:    Wear a 1 week ZioPatch and return it. We will follow up with results.   We will place you on our [facility] conference list to discuss surgical options. The discussion will likely happen in January 2024.   Dr. Balderas wants you to have a cath, likely in February 2024. We will reach out to you to schedule.       General Cardiac Recommendations:  Continue to eat a heart healthy, low salt diet.  Continue to get 20-30 minutes of aerobic activity, 4-5 days per week.  Examples of aerobic activity include walking, running, swimming, cycling, etc.  Continue to observe good oral hygiene, with regular dental visits.      SBE prophylaxis:   Yes__x__  No____    If YES is checked, follow the recommendations outlined below:  Take antibiotic(s) prior to recommended dental procedures and procedures on the respiratory tract or with infected skin, muscle or bones. SBE prophylaxis is not needed for routine GI and  procedures (ie. Colonoscopy or vaginal delivery)  Observe good oral hygiene daily, as advised by your dentist. Get regular professional dental care.  Keep cuts clean.  Infections should be treated promptly.  Symptoms of Infective Endocarditis could include: fever lasting more than 4-5 days or a recurrent fever that initially resolves but returns within 1-2 days)      Exercise restrictions:   Yes__X__  No____         If yes, list restrictions:  Must be allowed to rest if fatigued or SOB      FASTING CHOLESTEROL was checked in the last 5 years YES__x__  NO___ (2022)  If no, please follow up with your primary care physician. You should have a cholesterol screening every 5 years.      Follow-up:  Follow up with  Dr. Balderas in February 2024 with ECHO, EKG and fasting labs prior.     If you have questions or concerns please contact us at:    Pamela Jaimes RN, BSN    Madeline Jimenez (Scheduling)  Nurse Care Coordinator     Clinic   Adult Congenital and CV Genetics   Adult Congenital and CV Genetic  [facility]   [facility]  (P) [phone]     (P) [phone]  (F) [phone]     (F) [phone]      For after hours urgent needs, call [phone] and ask to speak to the Adult Congenital Physician on call.  Mention Job Code 0401.    For emergencies call 911.    [facility]   [facility]  Mail Code [id number]  [address] [place]  [address]

## 2023-10-24 NOTE — PROGRESS NOTES
Per Dr. Balderas, patient to have Zio monitor placed.  Diagnosis: Supraventricular tachycardia   Monitor placed: Yes  Patient Instructed: Yes  Patient verbalized understanding: Yes  Holter # [id number]

## 2023-10-24 NOTE — PROGRESS NOTES
Adult Congenital Cardiology Visit    Patient:  Xavi Mtz MRN:  [id number]   YOB: 1990 Age:  33 year old   Date of Visit:  Oct 24, 2023 PCP:  Hamzah Atkins MD     Dear Dr. Atkins,     I had the pleasure of seeing your patient Xavi Mtz at the [facility] Adult [facility]etics Clinic on Oct 24, 2023.   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 He is here today for scheduled follow up. He also sees Dr. Engle from  for management of recurrent atrial tachycardia. [facility] January 2023. Xavi reports that he has had very poor exercise tolerance for the last 10 years. He is not able to play outside for any length of time with his children. He feels lightheaded and dizzy, both with positional changes and randomly. He has frequent palpitations and feelings of a racing heart beat. No LOC, edema, orthopnea.  He sees the dentist regularly and uses amoxicillin for premedication (lifelong due to bioprosthetic valve)    Past medical history:    Born with Ebsteins anomaly  S/p bioprosthetic tricuspid valve replacement 1999 [facility]  Tricuspid valve balloon valvuloplasty 2017 [facility]  Cardiac Cath 2017 with RVEDP 13, LVEDP 12 PAP 24/9 CI 1.92 L/min/m2.   Placement of a 29 mm Sloan 3 transcatheter valve in tricuspid position with empiric ablation of  IVC/TV in 12/2019  A flutter ablation Jan 2023 ([facility])  Tobacco use- ongoing      Past Medical History:   Diagnosis Date     Congenital heart disease     Ebstein's Anomaly       Current Outpatient Medications   Medication Sig Dispense Refill     amoxicillin (AMOXIL) 500 MG capsule Take 4 capsules (2,000 mg) by mouth once as needed (one hour prior to dental procedures.) 4 capsule 3     aspirin (ASA) 81 MG tablet Take 1 tablet (81 mg) by mouth daily 90 tablet 3     diltiazem ER COATED BEADS (CARDIZEM CD/CARTIA XT) 180  "MG 24 hr capsule Take 1 capsule (180 mg) by mouth every morning (before breakfast) 90 capsule 3     metoprolol succinate ER (TOPROL XL) 50 MG 24 hr tablet 50 mg       spironolactone (ALDACTONE) 25 MG tablet Take 1 tablet (25 mg) by mouth daily 90 tablet 3     apixaban ANTICOAGULANT (ELIQUIS) 5 MG tablet 5 mg twice a day for 60 days. (Patient not taking: Reported on 10/24/2023) 60 tablet 1     multivitamin w/minerals (MULTI-VITAMIN) tablet Take 1 tablet by mouth daily (Patient not taking: Reported on 10/24/2023)         Allergies   Allergen Reactions     Ceclor [Cefaclor]      Mouth ulcers         Family History:   Family History   Problem Relation Age of Onset     Diabetes Mother           Social history:  3 children. 10 yo stepson, 10 yo son with ex wife 5 month old baby girl. Healthy, no cardiac disease,    Social History     Occupational History     Not on file   Tobacco Use     Smoking status: Every Day     Packs/day: 0.50     Years: 10.00     Additional pack years: 0.00     Total pack years: 5.00     Types: Cigarettes, Vaping Device     Smokeless tobacco: Current     Types: Chew     Tobacco comments:      Smokes E-Cigs   Substance and Sexual Activity     Alcohol use: No     Alcohol/week: 0.0 standard drinks of alcohol     Drug use: No     Sexual activity: Not on file       Marital Status:  has female SO      Review of Systems: A comprehensive review of systems was performed and is negative, except as noted in the HPI and PMH  Review of Systems     Physical exam: Vitals: /69 (BP Location: Right arm, Patient Position: Sitting, Cuff Size: Adult Regular)   Pulse 81   Ht 1.801 m (5' 10.91\")   Wt 102.6 kg (226 lb 3.2 oz)   SpO2 95%   BMI 31.63 kg/m    BMI= Body mass index is 31.63 kg/m .   There is no central or peripheral cyanosis. Pupils are reactive and sclera are not jaundiced. There is no conjunctival injection or discharge. EOMI. Mucous membranes are moist and pink.   Lungs are clear to " ausculation bilaterally with no wheezes, rales or rhonchi. There is no increased work of breathing, retractions or nasal flaring. Precordium is quiet with a normally placed apical impulse. On auscultation, heart sounds are regular with an accentuated S1 and normal S2. There are no murmurs, rubs or gallops.  Abdomen is soft and non-tender without masses or hepatomegaly. Femoral pulses are normal with no brachial femoral delay.Skin is without rashes, lesions, or significant bruising. Extremities are warm and well-perfused with no cyanosis, clubbing or edema. Peripheral pulses are normal and there is < 2 sec capillary refill. Patient is alert and oriented and moves all extremities equally with normal tone.     An echocardiogram performed today is notable for:  Tricuspid valve mean gradient 6 mmHg. No signficant tricuspid valve  insufficiency. There is moderate right ventricular enlargement with low normal systolic function. There is ventricular septal bouncing. Qualitatively low normal left ventricular systolic function. There is flow reversal in the  hepatic veins during atrial systole.    CPX: April 2023  Peak VO2 (ml/kg/min) VE/VCO2  [place] RER   20.29        42.90        1.24            Predicted VO2 (ml/kg/min) Predicted VO2 %   39.10        52            Resting Supine BP (mmHg) Resting Standing BP (mmHg) Final Stress BP (mmHg)   113/68        119/74        188/82          Resting Supine HR (bpm) Resting Standing HR (bpm)    62        71             Max HR (bpm) Max Predicted HR (bpm) Max Perdicted HR %   141        187        75            Exercise time (min) Exercise time (sec) Estimated workload (METS)   6        27        5.8            A cardiopulmonary stress test was performed following a Lv protocol with the patient exercising for 6 minutes and 27 seconds under the supervision of Dr. Kc.  Blood pressure demonstrated a normal response to exercise.  Heart rate demonstrated a blunted response to  exercise.     The stress electrocardiogram is negative for ischemia at the heart rate achieved.  The negative predictive value of the examination is reduced due to inability to achieve target HR.     The patient's exercise capacity is severely impaired.     A prior study was conducted on 10/9/2019. This study has changes noted with the prior study. This represents a 29.3% decrease in functional capacity.     The patient reported 7-8/10 shortness of breath and 5/10 light-headedness at peak exercise which resolved during recovery. During recovery, the patient had a 9 beat run of SVT.     ECG Summary    ECG Baseline electrocardiogram demonstrates sinus rhythm and right bundle branch block.   The stress electrocardiogram is negative for ischemia at the heart rate achieved.  The negative predictive value of the examination is reduced due to inability to achieve target HR. Arrhythmias during stress: Occasional PVCs. Arrhythmias during recovery: Supraventricular tachycardia for 9 beats.  Occasional PACs.     Technical Comments    Cardiac Protocol A cardiopulmonary stress test was performed following a Lv protocol with the patient exercising for 6 minutes and 27 seconds under the supervision of Dr. Kc.  Blood pressure demonstrated a normal response to exercise.  Heart rate demonstrated a blunted response to exercise.  The patient's exercise capacity is severely impaired.  The test was stopped due to patient request, dyspnea and light-headedness.  The patient reported dyspnea and light-headedness during the stress test.  Functional capacity response is Class II: 50-74%. The patient experienced no angina during the test.     Ventilatory    The baseline spirometry revealed normal lung volumes. The patient's oxygen saturation remained above 99% throughout testing.       MRA:  Clinical history: 32 yo Ebstein's anomaly, prosthetic TV 1999, valvuloplasty 2017, transcatheter  valve-in-valve 12/2019, ASD repair, atrial flutter  ablation 2019 and atrial tachy ablation 1/2023.      Comparison CMR: None     1. The LV is normal in cavity size and wall thickness. The global systolic function is normal. The LVEF is  58%. There are no regional wall motion abnormalities. There is a prominent septal bounce with systolic  flattening of the septum.     2. The RV is  mild-moderately dilated. The global systolic function is moderately reduced. The RVEF is 34%.      3. The right atrium is moderately dilated.  The left atrium is normal in size. Prior ASD repair, no  residual shunt noted.     4. Transcatheter prosthetic tricuspid valve-in-valve is well seated, cannot assess further due to  associated artifact. No other valve disease noted.     5. Late gadolinium enhancement imaging shows no MI, fibrosis or infiltrative disease.      6. There is no pericardial effusion or thickening.     7. There is no intracardiac thrombus.     CONCLUSIONS: Ebstein's anomaly with moderately reduced RV function, RVEF 34%, LVEF 58%. No myocardial  Fibrosis.    Last Zio -  No sustained arrhythmias.     Echo April 2023  Patient with history of Ebstein's Anomaly, after 33 mm tricuspid valve  replacement. Percutaneous 25mm balloon valvuloplasty of the tricuspid valve  (7/27/17). Now after 29mm Sloan 3 transcatheter valve on 12/6/2019 at [facility].  Tricuspid valve mean gradient 5 mmHg. No signficant tricuspid valve  insufficiency. There is moderate right ventricular enlargement with low normal  systolic function. The calculated biplane left ventricular ejection fraction  is 58 %. There is ventricular septal bouncing. Qualitatively low normal left  ventricular systolic function. There is flow reversal in the hepatic veins  during atrial systole.       In summary, Xavi is a 33 year old with Ebsteins anomaly s/p tricuspid valve replacement followed by transcatheter TV valve in 2019. Now with intermittent atrial tachycardia and poor exercise tolerance. 2017 cath suggests overall low  cardiac output with mildly diminished function of both left and right ventricles R> L.    Recommendations:  Continue current medications  [facility] discussion for possible surgical intervention. He may be a candidate for Jemal shunt to offload his right ventricle if he does poorly with this testing.  Cardiac Cath when available after first of the year -  R/L and coronary arteries.   Continue getting exercise as possible.  Let us know if new or worsening symptoms.      Thank you for the opportunity to participate in Xavi's care.  We will see him back after heart catheterization and [facility] discussion, sooner of more concerns.     A total of 45 minutes were spent in personal review of testing, including echo and labs, review of documented history and interval events in chart, face to face visit with discussion of findings and plan and  care coordination and documentation.     Sincerely,    Karthik Balderas M.D.   of Pediatrics  Pediatric and Adult Congenital Cardiology  [facility]  Pediatric Cardiology Office [phone]  Adult Congenital Cardiology Triage and Scheduling [phone]      CC:  Xavi Mtz

## 2023-10-25 LAB
CARDIOPULMONARY ANAEROBIC THRESHOLD PREDICTED PEAK: 46 %
CARDIOPULMONARY ANAEROBIC THRESHOLD VO2: 18.1 ML/KG/MIN
CARDIOPULMONARY BLOOD PRESSURE REST: NORMAL MMHG
CARDIOPULMONARY BREATHING RESERVE REST: 87.8
CARDIOPULMONARY BREATHING RESERVE V02MAX: 36
CARDIOPULMONARY CO2 OUTPUT REST: 425 ML/MIN
CARDIOPULMONARY CO2 OUTPUT VO2MAX: 2522 ML/MIN
CARDIOPULMONARY FEV 1.0 (L) ACTUAL: 4.22
CARDIOPULMONARY FEV 1.0 (L) PRECENT: 95 %
CARDIOPULMONARY FEV 1.0 (L) PREDICTED: 4.46
CARDIOPULMONARY FEV 1.0 FVC (%) ACTUAL: 85.6
CARDIOPULMONARY FEV 1.0 FVC (%) PERCENT: 105 %
CARDIOPULMONARY FEV 1.0 FVC (%) PREDICTED: 81.3
CARDIOPULMONARY FUNCTIONAL CAPACITY MAX ML/KG/MIN: 20.29 ML/KG/MIN
CARDIOPULMONARY FUNCTIONAL CAPACITY PERCENT: 52 %
CARDIOPULMONARY FUNCTIONAL CAPACITY PREDICTED: 39.1 ML/KG/MIN
CARDIOPULMONARY FVC (L) ACTUAL: 4.93
CARDIOPULMONARY FVC (L) PERCENT: 89 %
CARDIOPULMONARY FVC (L) PREDICTED: 5.51
CARDIOPULMONARY HEART RATE REST: 71 BPM
CARDIOPULMONARY MET'S REST: 1.2
CARDIOPULMONARY MINUTE VENTILATION REST: 17.9 L/MIN
CARDIOPULMONARY MINUTE VENTILATION VO2MAX: 95.2 L/MIN
CARDIOPULMONARY MYOCARDIAC O2 DEMAND MAX: NORMAL
CARDIOPULMONARY OXYGEN CONSUMPTION REST: 4.4 ML/KG/MIN
CARDIOPULMONARY OXYGEN CONSUMPTION VO2MAX: 20.29 ML/KG/MIN
CARDIOPULMONARY OXYGEN PULSE REST: 6 ML/BEAT
CARDIOPULMONARY OXYGEN PULSE VO2MAX: 14.6 ML/BEAT
CARDIOPULMONARY OXYGEN SATURATION- OXIMETRY REST: 99 %
CARDIOPULMONARY OXYGEN SATURATION- OXIMETRY VO2MAX: 99 %
CARDIOPULMONARY PET C02 REST: 28
CARDIOPULMONARY PET C02 VO2MAX: 30
CARDIOPULMONARY PET02 REST: 116
CARDIOPULMONARY PET02 V02 MAX: 116
CARDIOPULMONARY RER: 1.24
CARDIOPULMONARY RESPIRALORY EXCHANGE RATIO VO2MAX: 1.24
CARDIOPULMONARY RESPIRALORY EXCHANGE RATIO: 0.96
CARDIOPULMONARY RESPIRATORY RATE REST: 19 BR/MIN
CARDIOPULMONARY RESPIRATORY RATE VO2MAX: 42 BR/MIN
CARDIOPULMONARY STRESS BASE 1 BP MMHG: NORMAL MMHG
CARDIOPULMONARY STRESS BASE 1 BPA: 119 BPM
CARDIOPULMONARY STRESS BASE 1 SPO2: 99 % SPO2
CARDIOPULMONARY STRESS BASE 1 TIME SEC: 0 SEC
CARDIOPULMONARY STRESS BASE 1 TIME: 1 MINS
CARDIOPULMONARY STRESS BASE 2 BP MMHG: NORMAL MMHG
CARDIOPULMONARY STRESS BASE 2 BPA: 99 BPM
CARDIOPULMONARY STRESS BASE 2 SPO2: 100 % SPO2
CARDIOPULMONARY STRESS BASE 2 TIME SEC: 0 SEC
CARDIOPULMONARY STRESS BASE 2 TIME: 3 MINS
CARDIOPULMONARY STRESS BASE 3 BP MMHG: NORMAL MMHG
CARDIOPULMONARY STRESS BASE 3 BPA: 90 BPM
CARDIOPULMONARY STRESS BASE 3 SPO2: 100 % SPO2
CARDIOPULMONARY STRESS BASE 3 TIME SEC: 0 SEC
CARDIOPULMONARY STRESS BASE 3 TIME: 5 MINS
CARDIOPULMONARY STRESS PHASE 1 BP MMHG: NORMAL MMHG
CARDIOPULMONARY STRESS PHASE 1 BPM: 99 BPM
CARDIOPULMONARY STRESS PHASE 1 SPO2: 99 % SPO2
CARDIOPULMONARY STRESS PHASE 1 TIME SEC: 0 SEC
CARDIOPULMONARY STRESS PHASE 1 TIME: 3 MINS
CARDIOPULMONARY STRESS PHASE 2 BP MMHG: NORMAL MMHG
CARDIOPULMONARY STRESS PHASE 2 BPM: 133 BPM
CARDIOPULMONARY STRESS PHASE 2 SPO2: 99 % SPO2
CARDIOPULMONARY STRESS PHASE 2 TIME SEC: 0 SEC
CARDIOPULMONARY STRESS PHASE 2 TIME: 6 MINS
CARDIOPULMONARY STRESS PHASE 3 BPM: 141 BPM
CARDIOPULMONARY STRESS PHASE 3 SPO2: 99 % SPO2
CARDIOPULMONARY STRESS PHASE 3 TIME SEC: 27 SEC
CARDIOPULMONARY STRESS PHASE 3 TIME: 6 MINS
CARDIOPULMONARY SVC (L) ACTUAL: 4.74
CARDIOPULMONARY SVC (L) PERCENT: 86 %
CARDIOPULMONARY SVC (L) PREDICTED: 5.51
CARDIOPULMONARY TIDAL VOLUME REST: 921 ML
CARDIOPULMONARY TIDAL VOLUME VO2MAX: 2241 ML
CARDIOPULMONARY VE/VCO2 SLOPE: 42.9
CARDIOPULMONARY VENTILATORY EQUIVALENT 02 REST: 40
CARDIOPULMONARY VENTILATORY EQUIVALENT 02 V02: 42
CARDIOPULMONARY VENTILATORY EQUIVALENT C02 REST: 42
CARDIOPULMONARY VENTILATORY EQUIVALENT C02 SLOPE VO2MAX: 42.9
CARDIOPULMONARY VENTILATORY EQUIVALENT C02 VO2MAX: 38
CV STRESS MAX HR HE: 141
PREDICTED VO2MAX: 39.1
STRESS ANGINA INDEX: 0
STRESS ECHO BASELINE BP: NORMAL MMHG
STRESS ECHO BASELINE HR: 62 BPM
STRESS ECHO CALCULATED PERCENT HR: 75 %
STRESS ECHO LAST STRESS BP: NORMAL MMHG
STRESS ECHO POST ESTIMATED WORKLOAD: 5.8 METS
STRESS ECHO POST EXERCISE DUR MIN: 6 MIN
STRESS ECHO POST EXERCISE DUR SEC: 27 SEC
STRESS ECHO TARGET HR: 187

## 2023-10-29 DIAGNOSIS — Q22.5 EBSTEIN'S ANOMALY: ICD-10-CM

## 2023-10-29 DIAGNOSIS — I47.10 SVT (SUPRAVENTRICULAR TACHYCARDIA) (H): ICD-10-CM

## 2023-10-31 RX ORDER — DILTIAZEM HYDROCHLORIDE 180 MG/1
180 CAPSULE, COATED, EXTENDED RELEASE ORAL
Qty: 90 CAPSULE | Refills: 3 | Status: SHIPPED | OUTPATIENT
Start: 2023-10-31

## 2023-10-31 NOTE — TELEPHONE ENCOUNTER
LVD 10/24/2023  [facility] Karthik Tellez MD  Pediatric Cardiology   LABS NOV 2022 AND JAN 2023. RF PER PROTOCOL

## 2023-11-09 ENCOUNTER — TELEPHONE (OUTPATIENT)
Dept: CARDIOLOGY | Facility: CLINIC | Age: 33
End: 2023-11-09

## 2023-11-09 ENCOUNTER — HOSPITAL ENCOUNTER (EMERGENCY)
Facility: CLINIC | Age: 33
Discharge: HOME | End: 2023-11-09
Attending: EMERGENCY MEDICINE | Admitting: EMERGENCY MEDICINE
Payer: COMMERCIAL

## 2023-11-09 ENCOUNTER — MYC MEDICAL ADVICE (OUTPATIENT)
Dept: CARDIOLOGY | Facility: CLINIC | Age: 33
End: 2023-11-09

## 2023-11-09 VITALS
OXYGEN SATURATION: 96 % | RESPIRATION RATE: 10 BRPM | HEART RATE: 70 BPM | DIASTOLIC BLOOD PRESSURE: 78 MMHG | SYSTOLIC BLOOD PRESSURE: 138 MMHG

## 2023-11-09 DIAGNOSIS — R00.1 BRADYCARDIA: ICD-10-CM

## 2023-11-09 LAB
ANION GAP SERPL CALCULATED.3IONS-SCNC: 11 MMOL/L (ref 7–15)
BASOPHILS # BLD AUTO: 0.1 10E3/UL (ref 0–0.2)
BASOPHILS NFR BLD AUTO: 1 %
BUN SERPL-MCNC: 9.6 MG/DL (ref 6–20)
CALCIUM SERPL-MCNC: 9 MG/DL (ref 8.6–10)
CHLORIDE SERPL-SCNC: 105 MMOL/L (ref 98–107)
CREAT SERPL-MCNC: 0.82 MG/DL (ref 0.67–1.17)
DEPRECATED HCO3 PLAS-SCNC: 24 MMOL/L (ref 22–29)
EGFRCR SERPLBLD CKD-EPI 2021: >90 ML/MIN/1.73M2
EOSINOPHIL # BLD AUTO: 0.1 10E3/UL (ref 0–0.7)
EOSINOPHIL NFR BLD AUTO: 2 %
ERYTHROCYTE [DISTWIDTH] IN BLOOD BY AUTOMATED COUNT: 12 % (ref 10–15)
GLUCOSE SERPL-MCNC: 100 MG/DL (ref 70–99)
HCT VFR BLD AUTO: 41 % (ref 40–53)
HGB BLD-MCNC: 14.5 G/DL (ref 13.3–17.7)
HOLD SPECIMEN: NORMAL
HOLD SPECIMEN: NORMAL
IMM GRANULOCYTES # BLD: 0 10E3/UL
IMM GRANULOCYTES NFR BLD: 0 %
LYMPHOCYTES # BLD AUTO: 1.6 10E3/UL (ref 0.8–5.3)
LYMPHOCYTES NFR BLD AUTO: 28 %
MAGNESIUM SERPL-MCNC: 1.7 MG/DL (ref 1.7–2.3)
MCH RBC QN AUTO: 31.3 PG (ref 26.5–33)
MCHC RBC AUTO-ENTMCNC: 35.4 G/DL (ref 31.5–36.5)
MCV RBC AUTO: 89 FL (ref 78–100)
MONOCYTES # BLD AUTO: 0.5 10E3/UL (ref 0–1.3)
MONOCYTES NFR BLD AUTO: 10 %
NEUTROPHILS # BLD AUTO: 3.3 10E3/UL (ref 1.6–8.3)
NEUTROPHILS NFR BLD AUTO: 59 %
NRBC # BLD AUTO: 0 10E3/UL
NRBC BLD AUTO-RTO: 0 /100
PLATELET # BLD AUTO: 241 10E3/UL (ref 150–450)
POTASSIUM SERPL-SCNC: 4.1 MMOL/L (ref 3.4–5.3)
RBC # BLD AUTO: 4.63 10E6/UL (ref 4.4–5.9)
SODIUM SERPL-SCNC: 140 MMOL/L (ref 135–145)
TROPONIN T SERPL HS-MCNC: <6 NG/L
WBC # BLD AUTO: 5.5 10E3/UL (ref 4–11)

## 2023-11-09 PROCEDURE — 99284 EMERGENCY DEPT VISIT MOD MDM: CPT | Performed by: EMERGENCY MEDICINE

## 2023-11-09 PROCEDURE — 85025 COMPLETE CBC W/AUTO DIFF WBC: CPT | Performed by: EMERGENCY MEDICINE

## 2023-11-09 PROCEDURE — 83735 ASSAY OF MAGNESIUM: CPT | Performed by: EMERGENCY MEDICINE

## 2023-11-09 PROCEDURE — 36415 COLL VENOUS BLD VENIPUNCTURE: CPT | Performed by: EMERGENCY MEDICINE

## 2023-11-09 PROCEDURE — 93010 ELECTROCARDIOGRAM REPORT: CPT | Performed by: EMERGENCY MEDICINE

## 2023-11-09 PROCEDURE — 93005 ELECTROCARDIOGRAM TRACING: CPT | Performed by: EMERGENCY MEDICINE

## 2023-11-09 PROCEDURE — 80048 BASIC METABOLIC PNL TOTAL CA: CPT | Performed by: EMERGENCY MEDICINE

## 2023-11-09 PROCEDURE — 84484 ASSAY OF TROPONIN QUANT: CPT | Performed by: EMERGENCY MEDICINE

## 2023-11-09 PROCEDURE — 99284 EMERGENCY DEPT VISIT MOD MDM: CPT | Mod: 25 | Performed by: EMERGENCY MEDICINE

## 2023-11-09 ASSESSMENT — ACTIVITIES OF DAILY LIVING (ADL)
ADLS_ACUITY_SCORE: 35
ADLS_ACUITY_SCORE: 35

## 2023-11-09 NOTE — TELEPHONE ENCOUNTER
[facility] Call Center    Phone Message    May a detailed message be left on voicemail: yes     Reason for Call: Other: Patient was just at the [place] ED. He was asked to see EP MD in 1-2 weeks. Dr Engle has nothing available until end of February. Patient does not believe it is urgent and would like to be advised on when he should be seen. Please call patient to discuss.       Action Taken: Other: cardio    Travel Screening: Not Applicable

## 2023-11-09 NOTE — DISCHARGE INSTRUCTIONS
Follow-up with Dr. Engle in electrophysiology clinic.  They should contact you to set up an appointment.    Stop taking metoprolol, but continue taking your other medications.    Return to the emergency department for any problems.

## 2023-11-09 NOTE — Clinical Note
Xavi Mtz was seen and treated in our emergency department on 11/9/2023.  He may return to work on 11/10/2023.       If you have any questions or concerns, please don't hesitate to call.      Layo Carbajal MD

## 2023-11-11 NOTE — ED PROVIDER NOTES
ED Provider Note  [facility]      History     Chief Complaint   Patient presents with    Chest Pain     HPI  Xavi Mtz is a 33 year old male who has a past history of Ebsteins anomaly s/p tricuspid valve replacement in 2019 who presented with atrial tachycardiac in November 2022.  He has had ablation procedures due to atrial flutter at the [facility].  He is followed by cardiology at the [facility].  He presents today reporting that he is having episodes of what he feels is SVT, but that this is normal for him.  What he was concerned about today was episodes of bradycardia, stating that he would feel his heart slowed down at times and noticed on his home monitor that heart rate would drop into the 30s or 40s.  When this occurred, he felt somewhat lightheaded, extremely fatigued and heavy.  Nothing seemed to bring these episodes on.  He did call EMS and they report that they have noticed that the patient has been in either a sinus rhythm with a normal heart rate or episodes of what appears to be SVT.  Patient reports no recent changes in medications.  Other than a heavy feeling when his heart rate drops, he denies chest pain and denies shortness of breath.  No other complaints at this time.      Past Medical History  Past Medical History:   Diagnosis Date    Congenital heart disease     Ebstein's Anomaly     Past Surgical History:   Procedure Laterality Date    CARDIAC SURGERY      EP ABLATION ATRIAL FLUTTER N/A 1/24/2023    Procedure: EP Ablation Atrial Flutter;  Surgeon: Ventura Engle MD;  Location:  HEART CARDIAC CATH LAB    HEART CATH CHILD N/A 7/27/2017    Procedure: HEART CATH CHILD;  Right and Left Heart Cath;  Surgeon: Marisa Jessica MD;  Location: [facility] OR     amoxicillin (AMOXIL) 500 MG capsule  apixaban ANTICOAGULANT (ELIQUIS) 5 MG tablet  aspirin (ASA) 81 MG tablet  diltiazem ER COATED BEADS  (CARDIZEM CD/CARTIA XT) 180 MG 24 hr capsule  metoprolol succinate ER (TOPROL XL) 50 MG 24 hr tablet  multivitamin w/minerals (MULTI-VITAMIN) tablet  spironolactone (ALDACTONE) 25 MG tablet      Allergies   Allergen Reactions    Ceclor [Cefaclor]      Mouth ulcers     Family History  Family History   Problem Relation Age of Onset    Diabetes Mother      Social History   Social History     Tobacco Use    Smoking status: Every Day     Packs/day: 0.50     Years: 10.00     Additional pack years: 0.00     Total pack years: 5.00     Types: Cigarettes, Vaping Device    Smokeless tobacco: Current     Types: Chew    Tobacco comments:      Smokes E-Cigs   Substance Use Topics    Alcohol use: No     Alcohol/week: 0.0 standard drinks of alcohol    Drug use: No      Past medical history, past surgical history, medications, allergies, family history, and social history were reviewed with the patient. No additional pertinent items.      A medically appropriate review of systems was performed with pertinent positives and negatives noted in the HPI, and all other systems negative.    Physical Exam   BP: 138/78  Pulse: 80  Resp: 18  SpO2: 97 %  Physical Exam  Vitals and nursing note reviewed.   Constitutional:       General: He is not in acute distress.     Appearance: He is well-developed. He is not ill-appearing, toxic-appearing or diaphoretic.   HENT:      Head: Normocephalic and atraumatic.      Mouth/Throat:      Lips: Pink.      Mouth: Mucous membranes are moist.      Pharynx: Oropharynx is clear. No oropharyngeal exudate.   Eyes:      General: Lids are normal. No scleral icterus.     Extraocular Movements: Extraocular movements intact.      Right eye: No nystagmus.      Left eye: No nystagmus.      Conjunctiva/sclera: Conjunctivae normal.      Pupils: Pupils are equal, round, and reactive to light.   Neck:      Thyroid: No thyromegaly.      Vascular: No JVD.      Trachea: No tracheal deviation.   Cardiovascular:      Rate and  Rhythm: Normal rate and regular rhythm.      Pulses: Normal pulses.      Heart sounds: Normal heart sounds. No murmur heard.     No friction rub. No gallop.   Pulmonary:      Effort: Pulmonary effort is normal. No respiratory distress.      Breath sounds: Normal breath sounds.   Abdominal:      General: Bowel sounds are normal. There is no distension.      Palpations: Abdomen is soft. There is no mass.      Tenderness: There is no abdominal tenderness. There is no guarding or rebound.   Musculoskeletal:         General: No tenderness. Normal range of motion.      Cervical back: Normal range of motion and neck supple. No erythema or rigidity.      Right lower leg: No edema.      Left lower leg: No edema.   Lymphadenopathy:      Cervical: No cervical adenopathy.   Skin:     General: Skin is warm and dry.      Capillary Refill: Capillary refill takes less than 2 seconds.      Coloration: Skin is not pale.      Findings: No erythema or rash.   Neurological:      Mental Status: He is alert and oriented to person, place, and time.      Cranial Nerves: No cranial nerve deficit.      Sensory: No sensory deficit.      Motor: Motor function is intact.   Psychiatric:         Mood and Affect: Mood and affect normal.         Speech: Speech normal.         Behavior: Behavior normal.           ED Course, Procedures, & Data      Procedures            EKG Interpretation:      Interpreted by Layo Carbajal MD    Symptoms at time of EKG: Bradycardic episodes  Rhythm: normal sinus   Rate: 80  Ectopy: premature atrial contraction  Conduction: right bundle branch block (complete)  ST Segments/ T Waves: No acute ischemic changes  Q Waves: nonspecific  Comparison to prior: Unchanged    Clinical Impression: Sinus rhythm with right bundle branch block and occasional PAC, no change from past EKG    Results for orders placed or performed during the hospital encounter of 11/09/23   Basic metabolic panel     Status: Abnormal   Result  Value Ref Range    Sodium 140 135 - 145 mmol/L    Potassium 4.1 3.4 - 5.3 mmol/L    Chloride 105 98 - 107 mmol/L    Carbon Dioxide (CO2) 24 22 - 29 mmol/L    Anion Gap 11 7 - 15 mmol/L    Urea Nitrogen 9.6 6.0 - 20.0 mg/dL    Creatinine 0.82 0.67 - 1.17 mg/dL    GFR Estimate >90 >60 mL/min/1.73m2    Calcium 9.0 8.6 - 10.0 mg/dL    Glucose 100 (H) 70 - 99 mg/dL   Troponin T, High Sensitivity     Status: Normal   Result Value Ref Range    Troponin T, High Sensitivity <6 <=22 ng/L   Grace Draw     Status: None    Narrative    The following orders were created for panel order Grace Draw.  Procedure                               Abnormality         Status                     ---------                               -----------         ------                     Extra Blue Top Tube[[id number]]                              Final result               Extra Red Top Tube[[id number]]                               Final result                 Please view results for these tests on the individual orders.   Magnesium     Status: Normal   Result Value Ref Range    Magnesium 1.7 1.7 - 2.3 mg/dL   CBC with platelets and differential     Status: None   Result Value Ref Range    WBC Count 5.5 4.0 - 11.0 10e3/uL    RBC Count 4.63 4.40 - 5.90 10e6/uL    Hemoglobin 14.5 13.3 - 17.7 g/dL    Hematocrit 41.0 40.0 - 53.0 %    MCV 89 78 - 100 fL    MCH 31.3 26.5 - 33.0 pg    MCHC 35.4 31.5 - 36.5 g/dL    RDW 12.0 10.0 - 15.0 %    Platelet Count 241 150 - 450 10e3/uL    % Neutrophils 59 %    % Lymphocytes 28 %    % Monocytes 10 %    % Eosinophils 2 %    % Basophils 1 %    % Immature Granulocytes 0 %    NRBCs per 100 WBC 0 <1 /100    Absolute Neutrophils 3.3 1.6 - 8.3 10e3/uL    Absolute Lymphocytes 1.6 0.8 - 5.3 10e3/uL    Absolute Monocytes 0.5 0.0 - 1.3 10e3/uL    Absolute Eosinophils 0.1 0.0 - 0.7 10e3/uL    Absolute Basophils 0.1 0.0 - 0.2 10e3/uL    Absolute Immature Granulocytes 0.0 <=0.4 10e3/uL    Absolute NRBCs 0.0 10e3/uL   Extra  Blue Top Tube     Status: None   Result Value Ref Range    Hold Specimen JIC    Extra Red Top Tube     Status: None   Result Value Ref Range    Hold Specimen JIC    CBC with Platelets & Differential     Status: None    Narrative    The following orders were created for panel order CBC with Platelets & Differential.  Procedure                               Abnormality         Status                     ---------                               -----------         ------                     CBC with platelets and d...[[id number]]                      Final result                 Please view results for these tests on the individual orders.                       Medications - No data to display    No orders to display     Calls/Consults  Cardiology: Re-Called (paged again at [phone]) (11/09/23 [id number])        Assessment & Plan      This patient presented to the emergency department after having episodes of symptomatic bradycardia at home.  He has been on monitor here in the emergency department and has had no episodes of bradycardia.  No evidence of significant AV block on EKG.  Episodes of tachycardia are common for the patient and this does not appear to be what he was symptomatic from.  No significant electrolyte abnormalities are noted.  Patient does not appear hypovolemic clinically and hemoglobin is within normal limits.  There has been no loss of consciousness during these episodes.  I was able to speak to his electrophysiologist who reviewed his recent Zio patch tracing and feels that the patient has been exhibiting episodes of atrial tachycardia, but found no evidence of bradycardic episodes while the patient had a Zio patch on.  Given his episodes of bradycardia, he did recommend stopping metoprolol but continuing his calcium channel blocker and following up with electrophysiology as he may need another ablation procedure.  This plan was discussed with the patient and questions were answered.  He was discharged with  instructions for care and follow-up in good condition.    I have reviewed the nursing notes. I have reviewed the findings, diagnosis, plan and need for follow up with the patient.    Discharge Medication List as of 11/9/2023  3:08 PM          Final diagnoses:   Bradycardia       Layo Carbajal MD    [facility] EMERGENCY DEPT  11/9/2023     Layo Carbajal MD  11/11/23 [id number]

## 2023-12-22 ENCOUNTER — VIRTUAL VISIT (OUTPATIENT)
Dept: CARDIOLOGY | Facility: CLINIC | Age: 33
End: 2023-12-22
Attending: INTERNAL MEDICINE
Payer: COMMERCIAL

## 2023-12-22 VITALS — HEIGHT: 70 IN | WEIGHT: 215 LBS | BODY MASS INDEX: 30.78 KG/M2

## 2023-12-22 DIAGNOSIS — Q22.5 EBSTEIN'S ANOMALY: Primary | ICD-10-CM

## 2023-12-22 DIAGNOSIS — R00.1 BRADYCARDIA: ICD-10-CM

## 2023-12-22 PROCEDURE — 99215 OFFICE O/P EST HI 40 MIN: CPT | Mod: VID | Performed by: INTERNAL MEDICINE

## 2023-12-22 ASSESSMENT — PAIN SCALES - GENERAL: PAINLEVEL: NO PAIN (0)

## 2023-12-22 NOTE — PROGRESS NOTES
[facility]    Assessment/Recommendations   Assessment/Plan:    Mr. 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     He has an organized AFL on EKG on 11/11/22  and Zio. We dicussed in detail the therapeutic options including AAT and ablation therapy. Using AAT in this situation might exacerbate sinus node dysfunction as he is reporting flushing sensation sometimes after termination. We also discussed ablation. He elected to undergo and ablation and had an ablation of atypical AFL (inferior lateral wall between lateral scar and IVC) and focal AT (mid posterior wall) on 1/24/23.  Doing well without further sustained arrhythmias at this time. He will stay off Eliquis for now.  Given his report of slow HR we will do a Zio patch monitor for further monitoring.    Follow-up in 1 year or earlier if needed         History of Present Illness/Subjective    Mr. Xavi Mtz is a 33 year old male who comes in today for EP consultation for atrial flutter.    Mr. 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     He was  born with Ebstein anomaly of the tricuspid valve. He underwent a 33 mm prosthetic tricuspid valve placement in 1999 at [facility] followed by balloon valvuloplasty in 2017. He has developed an increasing tricuspid valve gradient over the past 2 years from 6-15 mmHg along with decreased exercise tolerance.He then underwent a 29mm Sloan 3 ranscatheter valve on 12/6/2019 at [facility]. 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 Of note he had not had prior documented atrial flutter.  He does have a long history of paroxysmal tachycardia, which was not seen at this electrophysiology study.    EP Visit 11/25/22: He presents today to establish care. He presented to ER on 11/11/22 with AFL. He was given IV metoprolol and reportedly converted to sinus. During visit to ED, had palpitations and a few second rush feeling after termination. This one was longer and the flushing sensation after termination is new over th last 2 month. He has had palpitations for about 5 years and empirical ablation of the CTI before valve replacement in 2019 did not fix the palpitations A zio patch monitor from 9/29/22-10/6/22 showed 2% AFL burden up to 3 hours 34 minutes with 8 symptom activations showing AFL and sinus. TTE done today showed a percutaneous 25mm balloon valvuloplasty of the tricuspid valve (7/27/17) and now after 29mm Sloan 3 ranscatheter valve on 12/6/2019, tricuspid valve mean gradient 6 mmHg. No signficant tricuspid valve insufficiency. There is moderate right ventricular enlargement with low normal systolic function. There is ventricular septal bouncing. Qualitatively low normal left ventricular systolic function. There is flow reversal in the hepatic veins during atrial systole. Presenting 12 lead ECG shows NSR with RBBB Vent Rate 71 bpm,  ms,  ms, QTc 493 ms. Current cardiac medications include: Eliquis,  Toprol XL, Diltiazem, and ASA. Of note, warfarin was stopped about 5 months after surgery because of labile INRs, ELiquis was started in ED.    EP Visit 4/8/23: He presents today for follow up. He had atypical AFL ablation and focal AT ablation on 1/24/23. Groin sites well healed. He reports feeling well. He thinks he is likely off Eliquis but not sure and will confirm. He has had some palpitations 5 times since 1/2023. 10 minutes and one 3 hour episodes. . He denies chest discomfort, abdominal fullness/bloating or peripheral edema, shortness of breath, paroxysmal nocturnal dyspnea, orthopnea, lightheadedness, dizziness, pre-syncope, or syncope. Presenting 12 lead ECG shows SR Vent Rate 78 bpm,  ms,  ms, QTc 490 ms. Current cardiac medications include: Toprol XL, Diltiazem, ASA, and Eliquis.     He presents today for follow up. He reports feeling well overall.  He had 1 episode of slow HR down to 25 bpm about 6 weeks ago.  He went to ER and workup was unrevealing.  His metoprolol was stopped and he is now only on diltiazem.  He later started on spironolactone.  For 2 weeks after that he reported feeling some slow HR is again but has since subsided.  Very rarely does he have palpitations and only has brief elevated HR's for seconds.. He denies chest discomfort, abdominal fullness/bloating or peripheral edema, shortness of breath, paroxysmal nocturnal dyspnea, orthopnea, lightheadedness, dizziness, pre-syncope, or syncope. A zio patch monitor showed 10/24/23-10/31/23 showed 22 PSVT up to 50.9 seconds, symptom activations showed sinus. A CMR from 10/2023 showed Ebstein's anomaly with moderately reduced RV function and no myocardial fibrosis. Current cardiac medications include: Toprol XL, Diltiazem, ASA, and Eliquis.          Cardiographics (Personally Reviewed) :   TTE 11/23/2022:  Patient with history of Ebstein's Anomaly, after 33 mm tricuspid valve replacement. Percutaneous 25mm balloon valvuloplasty  of the tricuspid valve (7/27/17). Now after 29mm Sloan 3 ranscatheter valve on 12/6/2019 at [facility]. Tricuspid valve mean gradient 6 mmHg. No signficant tricuspid valve insufficiency. There is moderate right ventricular enlargement with low normal systolic function. There is ventricular septal bouncing. Qualitatively low normal left ventricular systolic function. There is flow reversal in the hepatic veins during atrial systole.    1/20/20 Echo:   ------CONCLUSIONS------  Patient with history of Ebstein's Anomaly, after 33 mm tricuspid valve replacement. Percutaneous 25mm balloon valvuloplasty of the tricuspid valve (7/27/17). Now after 29mm Sloan 3 ranscatheter valve on 12/6/2019 at [facility]. Tricuspid valve mean gradient 6 mmHg. Trivial tricuspid valve insufficiency. 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 There is moderate right ventricular enlargement with low normal systolic function. There is mildly decreased left ventricular systolic function. The calculated biplane left ventricular ejection fraction is 50 %. The inferior vena cava is dilated, with a diameter of 2.4 cm. There is flow reversal in the hepatic veins during atrial systole.    1/23/23 CTA Heart:  IMPRESSION:   1.  Ebstein's anomaly status post repair with reduction atrioplasty,  pericardial patch closure of ASD, and bioprosthetic tricuspid valve  replacement with subsequent valve-in-valve TVR with 29 mm Sloan 3  prosthesis. The transcatheter tricuspid valve prosthesis appears  grossly normal on limited evaluation.   2.  Moderate right ventricular enlargement.  3.  Both atria and atrial appendages are free of thrombus.   4.  The coronary sinus is narrowed at its ostium/confluence with the  right atrium. Coronary and systemic venous anatomy is otherwise  Normal.    2017 Stress Test:   The patient exercised for 8 minutes and 20 seconds.    The patient exercised into Stage 3 of the Standard Lv  Protocol, achieving a heart rate of 151 bpm in sinus rhythm.   Blood pressure ish appropriately for each stage of exercise.  Heart rate was suboptimal with peak rate of 151 at peak exercise (79% predicted).  Baseline heart rate was 75 bpm and ish to 151 bpm with baseline blood pressure of 112/68 mmHg and ish to a max blood pressure of 154/76 mmHg at the end of stage 2. At the end of stage 3, blood pressure was 150/70 mmHg and dropped to 92/40 47 seconds into recovery  There were no arrhythmias.  There were no ST-T changes.  Exercise was stopped due to lightheadedness.  The patient recovered uneventfully from exercise.VO2 was 28.7 ml/kg/min (predicted 39.5), RER 1.02, VO2/heart rate was 17 mm/beat, predicted 18, but stroke volume curve is blunted suggesting limited cardiac output, VE/VCO2 slope was 45 (predicted 29)    2017 Heart Cath:  Low CI at 1.92 L/min/m2, TV area decreased 0.64 cm2, indexed valve area 0.31cm2/m2, mean gradient across TV 11 mmHg despite low CO. Decreased SBP to mid 60s, but improvement with dopamine and albumin infusion.    Balloon valvuloplasty with Tyshak II 25 mm x 5 cm balloon and Z Med II 25 mm x 4 cm balloons.  CI improved to 3.51 L/min/m2, TV area significantly improved 1.8 cm2 (indexed valve area 0.87 cm2/m2), mean gradient improved to 5.3 mmHg. SBPs were maintained in 90s after discontinuation of Dopamine.     10/24/23 CMR:  1. The LV is normal in cavity size and wall thickness. The global systolic function is normal. The LVEF is  58%. There are no regional wall motion abnormalities. There is a prominent septal bounce with systolic  flattening of the septum.     2. The RV is  mild-moderately dilated. The global systolic function is moderately reduced. The RVEF is 34%.      3. The right atrium is moderately dilated.  The left atrium is normal in size. Prior ASD repair, no  residual shunt noted.     4. Transcatheter prosthetic tricuspid valve-in-valve is well seated, cannot assess  "further due to  associated artifact. No other valve disease noted.     5. Late gadolinium enhancement imaging shows no MI, fibrosis or infiltrative disease.      6. There is no pericardial effusion or thickening.     7. There is no intracardiac thrombus.     CONCLUSIONS: Ebstein's anomaly with moderately reduced RV function, RVEF 34%, LVEF 58%. No myocardial  fibrosis.     Physical Examination   Ht 1.778 m (5' 10\")   Wt 97.5 kg (215 lb)   BMI 30.85 kg/m    Wt Readings from Last 3 Encounters:   10/24/23 101.8 kg (224 lb 6.9 oz)   10/24/23 102.6 kg (226 lb 3.2 oz)   04/28/23 100.1 kg (220 lb 10.9 oz)     The rest of a comprehensive physical examination is deferred due to nature of video visit restrictions.  CONSITUTIONAL: no acute distress  HEENT: no icterus, no redness or discharge, neck supple  CV: no visible edema of visualized extremities. No JVD.   RESPIRATORY: respirations nonlabored, no cough  NEURO: AA&Ox3, speech fluent/appropriate, motor grossly nonfocal  PSYCH: cooperative, affect appropriate  DERM: no rashes on visualized face/neck/upper extremities         Medications  Allergies   Current Outpatient Medications   Medication Sig Dispense Refill    amoxicillin (AMOXIL) 500 MG capsule Take 4 capsules (2,000 mg) by mouth once as needed (one hour prior to dental procedures.) 4 capsule 3    apixaban ANTICOAGULANT (ELIQUIS) 5 MG tablet 5 mg twice a day for 60 days. (Patient not taking: Reported on 10/24/2023) 60 tablet 1    aspirin (ASA) 81 MG tablet Take 1 tablet (81 mg) by mouth daily 90 tablet 3    diltiazem ER COATED BEADS (CARDIZEM CD/CARTIA XT) 180 MG 24 hr capsule Take 1 capsule (180 mg) by mouth every morning (before breakfast) 90 capsule 3    metoprolol succinate ER (TOPROL XL) 50 MG 24 hr tablet 50 mg      multivitamin w/minerals (MULTI-VITAMIN) tablet Take 1 tablet by mouth daily (Patient not taking: Reported on 10/24/2023)      spironolactone (ALDACTONE) 25 MG tablet Take 1 tablet (25 mg) by mouth " daily 90 tablet 3    Allergies   Allergen Reactions    Ceclor [Cefaclor]      Mouth ulcers         Lab Results (Personally Reviewed)    Chemistry/lipid CBC Cardiac Enzymes/BNP/TSH/INR   Lab Results   Component Value Date    BUN 9.6 11/09/2023     11/09/2023    CO2 24 11/09/2023     Creatinine   Date Value Ref Range Status   11/09/2023 0.82 0.67 - 1.17 mg/dL Final       Lab Results   Component Value Date    CHOL 170 11/25/2022    HDL 31 (L) 11/25/2022    LDL 86 11/25/2022      Lab Results   Component Value Date    WBC 5.5 11/09/2023    HGB 14.5 11/09/2023    HCT 41.0 11/09/2023    MCV 89 11/09/2023     11/09/2023    Lab Results   Component Value Date    TSH 1.31 11/25/2022    INR 3.28 (H) 01/09/2020        Video-Visit Details    Type of service:  Video Visit   Video Start Time:  9:30  Video End Time: 10:00    Originating Location (pt. Location): Home  Distant Location (provider location):  On-site  Platform used for Video Visit: [employer]    I have reviewed the note as documented above.  This accurately captures the substance of my virtual visit with the patient. The patient states understanding and is agreeable with the plan.   Ventura Engle MD [facility]  Cardiology - Electrophysiology        Total time spent on patient visit, reviewing notes, imaging, labs, orders, and completing necessary documentation: 45 minutes.  >50% of visit spent on counseling patient and/or coordination of care.

## 2023-12-22 NOTE — NURSING NOTE
No other vitals to report today      Is the patient currently in the state of [place]? YES    Visit mode:VIDEO    If the visit is dropped, the patient can be reconnected by: VIDEO VISIT: Text to cell phone:   Telephone Information:   Mobile [phone]       Will anyone else be joining the visit? NO  (If patient encounters technical issues they should call [phone])    How would you like to obtain your AVS? MyChart    Are changes needed to the allergy or medication list?  Pt states not taking Metoprolol, please review/remove. Pt states no other changes    Patient declined individual allergy and medication review by support staff because patient denies any changes since echeck-in completion and states all information entered during echeck-in remains accurate.    Reason for visit: ALEC PleitezF

## 2023-12-22 NOTE — PATIENT INSTRUCTIONS
You were seen today in the Adult Congenital and Cardiovascular Genetics Clinic at the [facility].    Cardiology Providers you saw during your visit:  Ventura Engle MD    Diagnosis:  Ebstein's    Results:  Ventura Engle MD reviewed the results of your 1 week ZioPatch testing today in clinic.    Recommendations for you:    We will send a 2 week ZioPatch to you, please wear it and send it back in. We will follow up with you on results.       General Cardiac Recommendations:  Continue to eat a heart healthy, low salt diet.  Continue to get 20-30 minutes of aerobic activity, 4-5 days per week.  Examples of aerobic activity include walking, running, swimming, cycling, etc.  Continue to observe good oral hygiene, with regular dental visits.      SBE prophylaxis:   Yes__x__  No____    If YES is checked, follow the recommendations outlined below:  Take antibiotic(s) prior to recommended dental procedures and procedures on the respiratory tract or with infected skin, muscle or bones. SBE prophylaxis is not needed for routine GI and  procedures (ie. Colonoscopy or vaginal delivery)  Observe good oral hygiene daily, as advised by your dentist. Get regular professional dental care.  Keep cuts clean.  Infections should be treated promptly.  Symptoms of Infective Endocarditis could include: fever lasting more than 4-5 days or a recurrent fever that initially resolves but returns within 1-2 days)      Exercise restrictions:   Yes__X__  No____         If yes, list restrictions:  Must be allowed to rest if fatigued or SOB      FASTING CHOLESTEROL was checked in the last 5 years YES__x__  NO____ (2022)  If no, please follow up with your primary care physician. You should have a cholesterol screening every 5 years.      Follow-up:  Follow up with Dr. Engle in 1 year (possibly sooner, depending on ZioPatch results)     If you have questions or concerns please contact us at:    Pamela Jaimes RN, BSN    Madeline Jimenez  (Scheduling)  Nurse Care Coordinator     Clinic   Adult Congenital and CV Genetics   Adult Congenital and CV Genetic  [facility]  (P) [phone]     (P) [phone]  (F) [phone]     (F) [phone]      For after hours urgent needs, call [phone] and ask to speak to the Adult Congenital Physician on call.  Mention Job Code 0401.    For emergencies call 911.    [facility]  Mail Code [id number]  [address] [place]  [address]

## 2023-12-22 NOTE — LETTER
12/22/2023      RE: Xavi Mtz  [address] [facility] [address]       Dear Colleague,    Thank you for the opportunity to participate in the care of your patient, Xavi Mtz, at the [facility] [place] at [facility]. Please see a copy of my visit note below.        ACHD ELECTROPHYSIOLOGY VIDEO VISIT    Assessment/Recommendations   Assessment/Plan:    Mr. 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     He has an organized AFL on EKG on 11/11/22  and Zio. We dicussed in detail the therapeutic options including AAT and ablation therapy. Using AAT in this situation might exacerbate sinus node dysfunction as he is reporting flushing sensation sometimes after termination. We also discussed ablation. He elected to undergo and ablation and had an ablation of atypical AFL (inferior lateral wall between lateral scar and IVC) and focal AT (mid posterior wall) on 1/24/23.  Doing well without further sustained arrhythmias at this time. He will stay off Eliquis for now.  Given his report of slow HR we will do a Zio patch monitor for further monitoring.    Follow-up in 1 year or earlier if needed         History of Present Illness/Subjective    Mr. Xavi Mtz is a 33 year old male who comes in today for EP consultation for atrial flutter.    Mr. 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 He underwent a 33 mm prosthetic tricuspid valve placement in 1999 at [facility] followed by balloon valvuloplasty in 2017. He has developed an increasing tricuspid valve gradient over the past 2 years from 6-15 mmHg along with decreased exercise tolerance.He then underwent a 29mm Sloan 3 ranscatheter valve on 12/6/2019 at [place]. 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 Of note he had not had prior documented atrial flutter.  He does have a long history of paroxysmal tachycardia, which was not seen at this electrophysiology study.    EP Visit 11/25/22: He presents today to establish care. He presented to ER on 11/11/22 with AFL. He was given IV metoprolol and reportedly converted to sinus. During visit to ED, had palpitations and a few second rush feeling after termination. This one was longer and the flushing sensation after termination is new over th last 2 month. He has had palpitations for about 5 years and empirical ablation of the CTI before valve replacement in 2019 did not fix the palpitations A zio patch monitor from 9/29/22-10/6/22 showed 2% AFL burden up to 3 hours 34 minutes with 8 symptom activations showing AFL and sinus. TTE done today showed a percutaneous 25mm balloon valvuloplasty of the tricuspid valve (7/27/17) and now after 29mm Sloan 3 ranscatheter valve on 12/6/2019, tricuspid valve mean gradient 6 mmHg. No signficant tricuspid valve insufficiency. There is moderate right ventricular  enlargement with low normal systolic function. There is ventricular septal bouncing. Qualitatively low normal left ventricular systolic function. There is flow reversal in the hepatic veins during atrial systole. Presenting 12 lead ECG shows NSR with RBBB Vent Rate 71 bpm,  ms,  ms, QTc 493 ms. Current cardiac medications include: Eliquis, Toprol XL, Diltiazem, and ASA. Of note, warfarin was stopped about 5 months after surgery because of labile INRs, ELiquis was started in ED.    EP Visit 4/8/23: He presents today for follow up. He had atypical AFL ablation and focal AT ablation on 1/24/23. Groin sites well healed. He reports feeling well. He thinks he is likely off Eliquis but not sure and will confirm. He has had some palpitations 5 times since 1/2023. 10 minutes and one 3 hour episodes. . He denies chest discomfort, abdominal fullness/bloating or peripheral edema, shortness of breath, paroxysmal nocturnal dyspnea, orthopnea, lightheadedness, dizziness, pre-syncope, or syncope. Presenting 12 lead ECG shows SR Vent Rate 78 bpm,  ms,  ms, QTc 490 ms. Current cardiac medications include: Toprol XL, Diltiazem, ASA, and Eliquis.     He presents today for follow up. He reports feeling well overall.  He had 1 episode of slow HR down to 25 bpm about 6 weeks ago.  He went to ER and workup was unrevealing.  His metoprolol was stopped and he is now only on diltiazem.  He later started on spironolactone.  For 2 weeks after that he reported feeling some slow HR is again but has since subsided.  Very rarely does he have palpitations and only has brief elevated HR's for seconds.. He denies chest discomfort, abdominal fullness/bloating or peripheral edema, shortness of breath, paroxysmal nocturnal dyspnea, orthopnea, lightheadedness, dizziness, pre-syncope, or syncope. A zio patch monitor showed 10/24/23-10/31/23 showed 22 PSVT up to 50.9 seconds, symptom activations showed sinus. A CMR from 10/2023  showed Ebstein's anomaly with moderately reduced RV function and no myocardial fibrosis. Current cardiac medications include: Toprol XL, Diltiazem, ASA, and Eliquis.          Cardiographics (Personally Reviewed) :   TTE 11/23/2022:  Patient with history of Ebstein's Anomaly, after 33 mm tricuspid valve replacement. Percutaneous 25mm balloon valvuloplasty of the tricuspid valve (7/27/17). Now after 29mm Sloan 3 ranscatheter valve on 12/6/2019 at [place]. Tricuspid valve mean gradient 6 mmHg. No signficant tricuspid valve insufficiency. There is moderate right ventricular enlargement with low normal systolic function. There is ventricular septal bouncing. Qualitatively low normal left ventricular systolic function. There is flow reversal in the hepatic veins during atrial systole.    1/20/20 Echo:   ------CONCLUSIONS------  Patient with history of Ebstein's Anomaly, after 33 mm tricuspid valve replacement. Percutaneous 25mm balloon valvuloplasty of the tricuspid valve (7/27/17). Now after 29mm Sloan 3 ranscatheter valve on 12/6/2019 at [place]. Tricuspid valve mean gradient 6 mmHg. Trivial tricuspid valve insufficiency. 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 There is moderate right ventricular enlargement with low normal systolic function. There is mildly decreased left ventricular systolic function. The calculated biplane left ventricular ejection fraction is 50 %. The inferior vena cava is dilated, with a diameter of 2.4 cm. There is flow reversal in the hepatic veins during atrial systole.    1/23/23 CTA Heart:  IMPRESSION:   1.  Ebstein's anomaly status post repair with reduction atrioplasty,  pericardial patch closure of ASD, and bioprosthetic tricuspid valve  replacement with subsequent valve-in-valve TVR with 29 mm Sloan 3  prosthesis. The transcatheter tricuspid valve prosthesis appears  grossly normal on limited evaluation.   2.  Moderate right ventricular  enlargement.  3.  Both atria and atrial appendages are free of thrombus.   4.  The coronary sinus is narrowed at its ostium/confluence with the  right atrium. Coronary and systemic venous anatomy is otherwise  Normal.    2017 Stress Test:   The patient exercised for 8 minutes and 20 seconds.    The patient exercised into Stage 3 of the Standard Lv Protocol, achieving a heart rate of 151 bpm in sinus rhythm.   Blood pressure ish appropriately for each stage of exercise.  Heart rate was suboptimal with peak rate of 151 at peak exercise (79% predicted).  Baseline heart rate was 75 bpm and ish to 151 bpm with baseline blood pressure of 112/68 mmHg and ish to a max blood pressure of 154/76 mmHg at the end of stage 2. At the end of stage 3, blood pressure was 150/70 mmHg and dropped to 92/40 47 seconds into recovery  There were no arrhythmias.  There were no ST-T changes.  Exercise was stopped due to lightheadedness.  The patient recovered uneventfully from exercise.VO2 was 28.7 ml/kg/min (predicted 39.5), RER 1.02, VO2/heart rate was 17 mm/beat, predicted 18, but stroke volume curve is blunted suggesting limited cardiac output, VE/VCO2 slope was 45 (predicted 29)    2017 Heart Cath:  Low CI at 1.92 L/min/m2, TV area decreased 0.64 cm2, indexed valve area 0.31cm2/m2, mean gradient across TV 11 mmHg despite low CO. Decreased SBP to mid 60s, but improvement with dopamine and albumin infusion.    Balloon valvuloplasty with Tyshak II 25 mm x 5 cm balloon and Z Med II 25 mm x 4 cm balloons.  CI improved to 3.51 L/min/m2, TV area significantly improved 1.8 cm2 (indexed valve area 0.87 cm2/m2), mean gradient improved to 5.3 mmHg. SBPs were maintained in 90s after discontinuation of Dopamine.     10/24/23 CMR:  1. The LV is normal in cavity size and wall thickness. The global systolic function is normal. The LVEF is  58%. There are no regional wall motion abnormalities. There is a prominent septal bounce with  "systolic  flattening of the septum.     2. The RV is  mild-moderately dilated. The global systolic function is moderately reduced. The RVEF is 34%.      3. The right atrium is moderately dilated.  The left atrium is normal in size. Prior ASD repair, no  residual shunt noted.     4. Transcatheter prosthetic tricuspid valve-in-valve is well seated, cannot assess further due to  associated artifact. No other valve disease noted.     5. Late gadolinium enhancement imaging shows no MI, fibrosis or infiltrative disease.      6. There is no pericardial effusion or thickening.     7. There is no intracardiac thrombus.     CONCLUSIONS: Ebstein's anomaly with moderately reduced RV function, RVEF 34%, LVEF 58%. No myocardial  fibrosis.     Physical Examination   Ht 1.778 m (5' 10\")   Wt 97.5 kg (215 lb)   BMI 30.85 kg/m    Wt Readings from Last 3 Encounters:   10/24/23 101.8 kg (224 lb 6.9 oz)   10/24/23 102.6 kg (226 lb 3.2 oz)   04/28/23 100.1 kg (220 lb 10.9 oz)     The rest of a comprehensive physical examination is deferred due to nature of video visit restrictions.  CONSITUTIONAL: no acute distress  HEENT: no icterus, no redness or discharge, neck supple  CV: no visible edema of visualized extremities. No JVD.   RESPIRATORY: respirations nonlabored, no cough  NEURO: AA&Ox3, speech fluent/appropriate, motor grossly nonfocal  PSYCH: cooperative, affect appropriate  DERM: no rashes on visualized face/neck/upper extremities         Medications  Allergies   Current Outpatient Medications   Medication Sig Dispense Refill     amoxicillin (AMOXIL) 500 MG capsule Take 4 capsules (2,000 mg) by mouth once as needed (one hour prior to dental procedures.) 4 capsule 3     apixaban ANTICOAGULANT (ELIQUIS) 5 MG tablet 5 mg twice a day for 60 days. (Patient not taking: Reported on 10/24/2023) 60 tablet 1     aspirin (ASA) 81 MG tablet Take 1 tablet (81 mg) by mouth daily 90 tablet 3     diltiazem ER COATED BEADS (CARDIZEM CD/CARTIA XT) " 180 MG 24 hr capsule Take 1 capsule (180 mg) by mouth every morning (before breakfast) 90 capsule 3     metoprolol succinate ER (TOPROL XL) 50 MG 24 hr tablet 50 mg       multivitamin w/minerals (MULTI-VITAMIN) tablet Take 1 tablet by mouth daily (Patient not taking: Reported on 10/24/2023)       spironolactone (ALDACTONE) 25 MG tablet Take 1 tablet (25 mg) by mouth daily 90 tablet 3    Allergies   Allergen Reactions     Ceclor [Cefaclor]      Mouth ulcers         Lab Results (Personally Reviewed)    Chemistry/lipid CBC Cardiac Enzymes/BNP/TSH/INR   Lab Results   Component Value Date    BUN 9.6 11/09/2023     11/09/2023    CO2 24 11/09/2023     Creatinine   Date Value Ref Range Status   11/09/2023 0.82 0.67 - 1.17 mg/dL Final       Lab Results   Component Value Date    CHOL 170 11/25/2022    HDL 31 (L) 11/25/2022    LDL 86 11/25/2022      Lab Results   Component Value Date    WBC 5.5 11/09/2023    HGB 14.5 11/09/2023    HCT 41.0 11/09/2023    MCV 89 11/09/2023     11/09/2023    Lab Results   Component Value Date    TSH 1.31 11/25/2022    INR 3.28 (H) 01/09/2020        Video-Visit Details    Type of service:  Video Visit   Video Start Time:  9:30  Video End Time: 10:00    Originating Location (pt. Location): Home  Distant Location (provider location):  On-site  Platform used for Video Visit: Alicia    I have reviewed the note as documented above.  This accurately captures the substance of my virtual visit with the patient. The patient states understanding and is agreeable with the plan.   Ventura Engle MD [facility]  Cardiology - Electrophysiology        Total time spent on patient visit, reviewing notes, imaging, labs, orders, and completing necessary documentation: 45 minutes.  >50% of visit spent on counseling patient and/or coordination of care.                       Please do not hesitate to contact me if you have any questions/concerns.     Sincerely,     Ventura Engle MD

## 2023-12-27 ENCOUNTER — ANCILLARY PROCEDURE (OUTPATIENT)
Dept: CARDIOLOGY | Facility: CLINIC | Age: 33
End: 2023-12-27
Payer: COMMERCIAL

## 2023-12-27 DIAGNOSIS — Q22.5 EBSTEIN'S ANOMALY: ICD-10-CM

## 2023-12-27 DIAGNOSIS — R00.1 BRADYCARDIA: ICD-10-CM

## 2024-01-15 NOTE — PROGRESS NOTES
Adult Congenital Cardiology Visit     Patient:  Xavi Mtz MRN:  [id number]   YOB: 1990 Age:  32 year old   Date of Visit:  Apr 28, 2023 PCP:  Hamzah Atkins MD      Dear Dr. Atkins,      I had the pleasure of seeing  Xavi Mtz at the [facility] Adult Congenital and Cardiovascular Genetics Clinic on Apr 28, 2023.   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 [facility] Dr. Engle from  for management of his Ebsteins anomaly and recurrent atrial tachycardia. Xavi reports that he has had very poor exercise tolerance for the last 10 years. He is not able to play outside for any length of time with his 8 year old son. He is working but fatigued with intermittent palpitations. . No LOC, edema, orthopnea.  HE presented in intermittent atrial tachycardia in November 2022 and is on eliquis, diltiazem, Toprol XL and ASA. MAYCOLe underwent aflutter ablation in January 2023.        Xavi reports that he continues to feel his chest pounding - can last minutes to hours. He gets lightheaded and dizzy. His exercise tolerance is reduced and he struggles with a single flight of stairs. No edema, LOC, chest pain. DOes have shortness of breath.         Past medical history:    Born with Ebsteins anomaly  S/p bioprosthetic tricuspid valve replacement 1999 [facility]  Tricuspid valve balloon valvuloplasty 2017 [facility]  Cardiac Cath 2017 with RVEDP 13, LVEDP 12 PAP 24/9 CI 1.92 L/min/m2.   Placement of a 29 mm Sloan 3 transcatheter valve in tricuspid position with empiric ablation of  IVC/TV in 12/2019  A Flutter Ablation JAn 2023 ([facility])  Tobacco use- ongoing        Past Medical History        Past Medical History:   Diagnosis Date     Congenital heart disease       Ebstein's Anomaly            Current Outpatient Prescriptions          Current Outpatient  "Medications   Medication Sig Dispense Refill     amoxicillin (AMOXIL) 500 MG capsule Take 4 capsules (2,000 mg) by mouth once as needed (one hour prior to dental procedures.) 4 capsule 3     apixaban ANTICOAGULANT (ELIQUIS) 5 MG tablet 5 mg twice a day for 60 days. 60 tablet 1     aspirin (ASA) 81 MG tablet Take 1 tablet (81 mg) by mouth daily 90 tablet 3     diltiazem ER COATED BEADS (CARDIZEM CD/CARTIA XT) 180 MG 24 hr capsule Take 1 capsule (180 mg) by mouth every morning (before breakfast) 90 capsule 3     multivitamin w/minerals (MULTI-VITAMIN) tablet Take 1 tablet by mouth daily         metoprolol succinate ER (TOPROL XL) 50 MG 24 hr tablet Take 1 tablet (50 mg) by mouth daily (Patient not taking: Reported on 4/28/2023) 90 tablet 3                  Allergies   Allergen Reactions     Ceclor [Cefaclor]         Mouth ulcers            Family History:   Family History         Family History   Problem Relation Age of Onset     Diabetes Mother                 Social history: 10 yo step son,  7 yo son healthy, no cardiac disease, expecting second child this spring.   Social History            Occupational History     Not on file   Tobacco Use     Smoking status: Some Days       Packs/day: 0.50       Years: 10.00       Pack years: 5.00       Types: Cigarettes     Smokeless tobacco: Current       Types: Chew     Tobacco comments:        Smokes E-Cigs   Vaping Use     Vaping status: Not on file   Substance and Sexual Activity     Alcohol use: No       Alcohol/week: 0.0 standard drinks of alcohol     Drug use: No     Sexual activity: Not on file         Marital Status:         Review of Systems: A comprehensive review of systems was performed and is negative, except as noted in the HPI and PMH  Review of Systems      Physical exam: Vitals: /83 (BP Location: Right arm, Patient Position: Sitting, Cuff Size: Adult Large)   Pulse 82   Ht 1.791 m (5' 10.51\")   Wt 100.1 kg (220 lb 11.2 oz)   SpO2 98%   BMI " 31.21 kg/m    BMI= Body mass index is 31.21 kg/m .   There is no central or peripheral cyanosis. Pupils are reactive and sclera are not jaundiced. There is no conjunctival injection or discharge. EOMI. Mucous membranes are moist and pink.   Lungs are clear to ausculation bilaterally with no wheezes, rales or rhonchi. There is no increased work of breathing, retractions or nasal flaring. Precordium is quiet with a normally placed apical impulse. On auscultation, heart sounds are regular with an accentuated S1 and normal S2. There are no murmurs, rubs or gallops.  Abdomen is soft and non-tender without masses or hepatomegaly. Femoral pulses are normal with no brachial femoral delay.Skin is without rashes, lesions, or significant bruising. Extremities are warm and well-perfused with no cyanosis, clubbing or edema. Peripheral pulses are normal and there is < 2 sec capillary refill. Patient is alert and oriented and moves all extremities equally with normal tone.       A 12 lead EKG was performed today and revealed NSR at a rate of 78 there is RVE with RBBB qrs duration 150msec.   .   An echocardiogram performed today is notable for:  Tricuspid valve mean gradient stable at 5 mm mmHg. No signficant tricuspid valve insufficiency. There is moderate right ventricular enlargement with low normal systolic function. There is ventricular septal bouncing. Qualitatively low normal left ventricular systolic function. There is flow reversal in the  hepatic veins during atrial systole.            Results for orders placed or performed in visit on 04/28/23   EKG 12-lead, tracing only (Same Day)     Status: None   Result Value Ref Range     Systolic Blood Pressure   mmHg     Diastolic Blood Pressure   mmHg     Ventricular Rate 78 BPM     Atrial Rate 78 BPM     AK Interval 186 ms     QRS Duration 154 ms      ms     QTc 490 ms     P Axis 19 degrees     R AXIS 64 degrees     T Axis 24 degrees     Interpretation ECG           Sinus  rhythm  Right bundle branch block  Abnormal ECG  When compared with ECG of 2023 15:29,  No significant change was found  Confirmed by MD SHERLY, JOYCE () on 2023 12:50:14 PM      Results for orders placed or performed in visit on 23   Echo Congenital Adult     Status: None     Narrative     [id number]  [id number]  [id number]  [id number]^JAN^CHERRI^ROSALIA                                                               Study ID: [id number]                                                 [facility] [address]                                                Phone: [phone]                                Pediatric Echocardiogram  ______________________________________________________________________________  Name: BLADE MORFIN  Study Date: 2023 07:46 AM                     Patient Location: [facility]  MRN: [id number]                                     Age: 32 yrs  : 1990  Gender: Male  Patient Class: Outpatient                           Height: 178 cm  Ordering Provider: CHERRI MONTOYA                Weight: 97 kg  Referring Provider: CHERRI MONTOYA               BSA: 2.1 m2  Performed By: Sarah Benavides  Report approved by: Raquel Li MD  Reason For Study: Ebstein's anomaly, SVT (supraventricular tachycardia) (H),  Atria  ______________________________________________________________________________  ##### CONCLUSIONS #####  Patient with history of Ebstein's Anomaly, after 33 mm tricuspid valve  replacement. Percutaneous 25mm balloon valvuloplasty of the tricuspid valve  (17). Now after 29mm Sloan 3 transcatheter valve on 2019 at [facility].  Tricuspid valve mean gradient 5 mmHg. No signficant tricuspid valve  insufficiency. There is moderate right ventricular  enlargement with low normal  systolic function. The calculated biplane left ventricular ejection fraction  is 58 %. There is ventricular septal bouncing. Qualitatively low normal left  ventricular systolic function. There is flow reversal in the hepatic veins  during atrial systole.     ______________________________________________________________________________  Technical information:  A complete two dimensional, MMODE, spectral and color Doppler transthoracic  echocardiogram is performed. Images are obtained from parasternal, apical,  subcostal and suprasternal notch views. Technically difficult study due to  poor acoustic windows. Prior echocardiogram available for comparison. ECG  tracing shows regular rhythm.     Segmental Anatomy:  There is normal atrial arrangement, with concordant atrioventricular and  ventriculoarterial connections.     Systemic and pulmonary veins:  The inferior vena cava drains normally to the right atrium. There is flow  reversal in the hepatic veins during atrial systole. Color flow demonstrates  flow from two pulmonary veins entering the left atrium.     Atria and atrial septum:  The left atrium is normal in size. There is moderate right atrial enlargement.  The atrial septum is not well visualized.     Atrioventricular valves:  Ebstein's anomaly of the tricuspid valve. Post tricuspid valve replacement  with a porcine prosthetic valve. Post percutaneous balloon valvuloplasty of  the tricuspid valve. Tricuspid valve mean gradient 5 mmHg. The mitral valve is  normal in appearance and motion. There is no mitral valve insufficiency.     Ventricles and Ventricular Septum:  There is moderate right ventricular enlargement. Low normal right ventricular  systolic function. There is ventricular septal bouncing. Normal left  ventricular size. The left ventricular dimmensions measured by MMODE are  affected by the abnormal septal motion. There is mild flattening of the  ventricular septum in  systole. The calculated biplane left ventricular  ejection fraction is 58 %.     Outflow tracts:  Normal great artery relationship. There is unobstructed flow through the right  ventricular outflow tract. The pulmonary valve motion is normal. There is  normal flow across the pulmonary valve. Trivial pulmonary valve insufficiency.  There is unobstructed flow through the left ventricular outflow tract.  Tricuspid aortic valve with normal appearance and motion. There is normal flow  across the aortic valve.     Great arteries:  The main pulmonary artery has normal appearance. There is unobstructed flow in  the main pulmonary artery. The pulmonary artery bifurcation is normal. There  is unobstructed flow in both branch pulmonary arteries. Normal ascending  aorta. The aortic arch appears normal. There is unobstructed antegrade flow in  the ascending, transverse arch, descending thoracic and abdominal aorta.     Arterial Shunts:  The ductal region is not imaged with this study.     Coronaries:  The coronary arteries are not evaluated.     Effusions, catheters, cannulas and leads:  No pericardial effusion.     MMode/2D Measurements & Calculations  LA dimension: 3.6 cm                       Ao root diam: 2.6 cm  LA/Ao: 1.4                                             LVLd %diff: -2.0 %                                             LVLs %diff: 0.83 %                                             EF(MOD-bp): 57.8 %  LVMI(BSA): 71.8 grams/m2                   LVMI(Height): 33.5  RWT(MM): 0.44     Doppler Measurements & Calculations  Ao V2 max: 106.2 cm/sec                LV V1 max: 75.4 cm/sec  Ao max P.5 mmHg                    LV V1 max P.3 mmHg  TV V2 max: 159.8 cm/sec                PA V2 max: 118.9 cm/sec  TV max PG: 10.2 mmHg                   PA max P.7 mmHg  TV mean P.4 mmHg  TV V2 VTI: 57.6 cm  RV V1 max: 101.1 cm/sec  RV V1 max P.1 mmHg     desc Ao max jaylen: 96.0 cm/sec              MPA max jaylen: 95.1  cm/sec  desc Ao max PG: 3.7 mmHg                  MPA max PG: 3.6 mmHg     [place] 2D Z-SCORE VALUES  Measurement Name Value Z-ScorePredictedNormal Range  Ao sinus diam(2D)3.3 cm  Ao ST Jx Diam(2D)3.0 cm  asc Aorta(2D)    3.0 cm     [place] Z-Scores (Measurements & Calculations)  Measurement NameValue      Z-ScorePredictedNormal Range  IVSd(MM)        1.0 cm     -0.28  1.1      0.75 - 1.42  LVIDd(MM)       4.5 cm     -2.2   5.4      4.6 - 6.3  LVIDs(MM)       2.7 cm     -2.0   3.5      2.7 - 4.3  LVPWd(MM)       1.00 cm    -0.11  1.0      0.74 - 1.29  LV mass(C)d(MM) 158.9 grams-1.7   226.5    152.2 - 337.0  FS(MM)          39.8 %     1.3    34.5     27.6 - 43.1     Report approved by: Kalee Babin 04/28/2023 08:30 AM                  In summary, Xavi is a 32 year old with Ebsteins anomaly s/p tricuspid valve replacement followed by transcatheter TV valve in 2019. Now with intermittent atrial tachycardia and poor exercise tolerance. 2017 cath suggests overall low cardiac output with mildly diminished function of both left and right ventricles     Recommendations:  1. Please wear zio for 7 days- will follow up on results  2. START spironolactone 25mg daily  3. Please go to the dentist, you need premedication with amoxicillin 2 grams one hour prior to dental cleanings.   4. Increase daily exercise     Follow up with Dr. Balderas in 6 months with a CPX and cardiac MRI prior. Dr. Engle follow up based on zio results.      Thank you for the opportunity to participate in Xavi's care.  I asked to see him back post ablation with an echocardiogram to assess ventricular function. Please do not hesitate to call with questions or concerns.     A total of 30minutes were spent in personal review of testing, including echo and labs, review of documented history and interval events in chart, face to face visit with discussion of findings and plan and  care coordination.      Sincerely,     Karthik Balderas M.D.  Associate  Professor of Pediatrics  Pediatric and Adult Congenital Cardiology  [facility]  Pediatric Cardiology Office [phone]  Adult Congenital Cardiology Triage and Scheduling [phone]        CC:  Xavi Mtz          Answers for HPI/ROS submitted by the patient on 4/27/2023  General Symptoms: No  Skin Symptoms: No  HENT Symptoms: No  EYE SYMPTOMS: No  HEART SYMPTOMS: Yes  LUNG SYMPTOMS: No  INTESTINAL SYMPTOMS: No  URINARY SYMPTOMS: No  REPRODUCTIVE SYMPTOMS: No  SKELETAL SYMPTOMS: No  BLOOD SYMPTOMS: No  NERVOUS SYSTEM SYMPTOMS: No  MENTAL HEALTH SYMPTOMS: No  Chest pain or pressure: No  Fast or irregular heartbeat: Yes  Pain in legs with walking: No  Trouble breathing while lying down: No  Fingers or toes appear blue: No  High blood pressure: No  Low blood pressure: No  Fainting: No  Murmurs: No  Pacemaker: No  Varicose veins: No  Edema or swelling: No  Wake up at night with shortness of breath: No  Light-headedness: No  Exercise intolerance: Yes

## 2024-01-16 ENCOUNTER — TELEPHONE (OUTPATIENT)
Dept: CARDIOLOGY | Facility: CLINIC | Age: 34
End: 2024-01-16
Payer: COMMERCIAL

## 2024-01-16 ENCOUNTER — MYC MEDICAL ADVICE (OUTPATIENT)
Dept: CARDIOLOGY | Facility: CLINIC | Age: 34
End: 2024-01-16
Payer: COMMERCIAL

## 2024-01-17 ENCOUNTER — TELEPHONE (OUTPATIENT)
Dept: CARDIOLOGY | Facility: CLINIC | Age: 34
End: 2024-01-17
Payer: COMMERCIAL

## 2024-01-17 DIAGNOSIS — Q22.5 EBSTEIN'S ANOMALY: ICD-10-CM

## 2024-01-17 DIAGNOSIS — I47.10 SVT (SUPRAVENTRICULAR TACHYCARDIA) (H): Primary | ICD-10-CM

## 2024-01-17 DIAGNOSIS — R00.1 BRADYCARDIA: ICD-10-CM

## 2024-01-17 DIAGNOSIS — I48.92 ATRIAL FLUTTER, UNSPECIFIED TYPE (H): ICD-10-CM

## 2024-01-17 NOTE — TELEPHONE ENCOUNTER
I-rhythm called to report an abnormal zio on this pt of Dr Engle.  I notified the congenital team regarding this message and will forward this to the Congenital team for further review.    Liang KIM

## 2024-01-24 ENCOUNTER — CARE COORDINATION (OUTPATIENT)
Dept: CARDIOLOGY | Facility: CLINIC | Age: 34
End: 2024-01-24
Payer: COMMERCIAL

## 2024-01-24 DIAGNOSIS — I47.10 SVT (SUPRAVENTRICULAR TACHYCARDIA) (H): ICD-10-CM

## 2024-01-24 DIAGNOSIS — Q22.5 EBSTEIN'S ANOMALY: Primary | ICD-10-CM

## 2024-01-24 DIAGNOSIS — T82.897D: ICD-10-CM

## 2024-01-24 DIAGNOSIS — I48.92 ATRIAL FLUTTER, UNSPECIFIED TYPE (H): ICD-10-CM

## 2024-01-24 RX ORDER — SODIUM CHLORIDE 9 MG/ML
INJECTION, SOLUTION INTRAVENOUS CONTINUOUS
Status: CANCELLED | OUTPATIENT
Start: 2024-01-24

## 2024-01-24 RX ORDER — ASPIRIN 81 MG/1
243 TABLET, CHEWABLE ORAL ONCE
Status: CANCELLED | OUTPATIENT
Start: 2024-01-24

## 2024-01-24 RX ORDER — POTASSIUM CHLORIDE 1500 MG/1
20 TABLET, EXTENDED RELEASE ORAL
Status: CANCELLED | OUTPATIENT
Start: 2024-01-24

## 2024-01-24 RX ORDER — ASPIRIN 325 MG
325 TABLET ORAL ONCE
Status: CANCELLED | OUTPATIENT
Start: 2024-01-24 | End: 2024-01-24

## 2024-01-24 RX ORDER — LIDOCAINE 40 MG/G
CREAM TOPICAL
Status: CANCELLED | OUTPATIENT
Start: 2024-01-24

## 2024-01-24 NOTE — LETTER
January 31, 2024       TO: Xavi Morris Bibi  [address] [facility] [address]         Dear Xavi,    Pre-procedure instructions - Coronary Angiogram  Patient Education    Your arrival time is 6:30AM on Wednesday, 2/7/24.  Location is [facility] [address], [place] [address] - Main Entrance of the Hospital  *Please check your temperature the morning of procedure and call [facility] at [phone] if temp is >100.0.    Please plan on being at the hospital all day.  At any time, emergencies and/or urgent cases may come up which could delay the start of your procedure.    Pre-procedure instructions - Coronary Angiogram  Shower in the evening before or the morning of the procedure    No solid food for 8 hours prior and nothing to drink 2 hours prior to arrival time    You can take your morning medications with sips of water.  You may skip your morning aldactone     Take 325 mg of Asprin 24 hours prior to the procedure and the morning of procedure.   This will be 4 baby aspirin on Tuesday morning 2/6 and 4 baby aspirin Wednesday morning 2/7.    You will need to arrange a ride to drop you off and pick you up, as you will be unable to drive home.  Prior to discharge you may be required to lay flat for approximately 2-4 hours in the recovery unit to ensure proper clotting of the artery. You will need a responsible adult to stay with you for 24 hours post-procedure.      You will need to follow up with Dr. Balderas on Tuesday 2/27 with an ECHO and labs prior as previously scheduled. If you need help scheduling or rescheduling your appointment, please call [phone].

## 2024-01-24 NOTE — PROGRESS NOTES
Date: 1/24/2024    Time of Call: 11:05 AM     Diagnosis:  Ebstein's     [ TORB ] Ordering provider: Dr. Karthik Balderas  Order: R/L/CORS     Order received by: Pamela MARIN RN     Follow-up/additional notes: Scheduling cath for 2/7/24. Will follow-up with Dr. Balderas on 2/27/24.

## 2024-01-31 NOTE — TELEPHONE ENCOUNTER
Date: 1/31/2024    Time of Call: 9:37 AM     Diagnosis:  Ebstein's     [ TORB ] Ordering provider: Dr. Ventura Engle  Order: Follow-up in 6 months with 1 week ZioPatch prior     Order received by: Pamela MARIN RN     Follow-up/additional notes: after ZioPatch results December 2023/January 2024. Dr. Engle called the patient.

## 2024-02-01 ENCOUNTER — TELEPHONE (OUTPATIENT)
Dept: CARDIOLOGY | Facility: CLINIC | Age: 34
End: 2024-02-01
Payer: COMMERCIAL

## 2024-02-01 NOTE — TELEPHONE ENCOUNTER
Spoke with patient, who states that Tylenol has started working for fever. Recommended alternating with ibuprofen for symptom relief. Discussed that per Dr. Balderas, Paxlovid would not be a safe choice at this time due to also being on diltiazem. Plan to call patient back with plan for whether to keep or postpone cath date for next Wednesday. Discussed when to consider going to the ER. Patient states understanding.

## 2024-02-01 NOTE — TELEPHONE ENCOUNTER
[facility] Call Center    Phone Message    May a detailed message be left on voicemail: yes     Reason for Call: Other: pt is calling to report Covid+ with temps 103.7-103.9 with body aches. Please call back pt to discuss procedure on 2/7/24.      Action Taken: Other: cardiology     Travel Screening: Not Applicable                                                               Thank you!  [facility]

## 2024-02-02 ENCOUNTER — MYC MEDICAL ADVICE (OUTPATIENT)
Dept: CARDIOLOGY | Facility: CLINIC | Age: 34
End: 2024-02-02
Payer: COMMERCIAL

## 2024-02-21 NOTE — PATIENT INSTRUCTIONS
You were seen today in the Adult Congenital and Cardiovascular Genetics Clinic at the [facility].    Cardiology Providers you saw during your visit:  Karthik Balderas MD    Diagnosis:  Ebstein's    Results:  Karthik Balderas MD reviewed the results of your EKG, ECHO and lab testing today in clinic.    Recommendations for you:    No changes today.   After your diagnostic cath on 3/13, the results will be discussed at one of our [facility] conferences. Dr. Balderas says that your case will likely be discussed late March or early April. The goal of this conference is to help decide on any possible surgical interventions that you may need.       General Cardiac Recommendations:  Continue to eat a heart healthy, low salt diet.  Continue to get 20-30 minutes of aerobic activity, 4-5 days per week.  Examples of aerobic activity include walking, running, swimming, cycling, etc.  Continue to observe good oral hygiene, with regular dental visits.      SBE prophylaxis:   Yes__x__  No____    If YES is checked, follow the recommendations outlined below:  Take antibiotic(s) prior to recommended dental procedures and procedures on the respiratory tract or with infected skin, muscle or bones. SBE prophylaxis is not needed for routine GI and  procedures (ie. Colonoscopy or vaginal delivery)  Observe good oral hygiene daily, as advised by your dentist. Get regular professional dental care.  Keep cuts clean.  Infections should be treated promptly.  Symptoms of Infective Endocarditis could include: fever lasting more than 4-5 days or a recurrent fever that initially resolves but returns within 1-2 days)      Exercise restrictions:   Yes__X__  No____         If yes, list restrictions:  Must be allowed to rest if fatigued or SOB      FASTING CHOLESTEROL was checked in the last 5 years YES__x__  NO____ (2022)  If no, please follow up with your primary care physician. You should have a cholesterol screening every 5 years.      Follow-up:     Follow up with Dr. Engle with 1 week Sabina prior.   Follow up with Dr. Balderas after cath.     If you have questions or concerns please contact us at:    Pamela Jaimes RN, BSN    Madeline Jimenez (Scheduling)  Nurse Care Coordinator     Clinic   Adult Congenital and CV Genetics   Adult Congenital and CV Genetic  [facility]   [facility]  (P) [phone]     (P) [phone]  (F) [phone]     (F) [phone]      For after hours urgent needs, call [phone] and ask to speak to the Adult Congenital Physician on call.  Mention Job Code 0401.    For emergencies call 911.    [facility]   Clinics and Surgery Center  Mail Code [id number]  [address] [place]  [address]

## 2024-02-27 ENCOUNTER — ANCILLARY PROCEDURE (OUTPATIENT)
Dept: CARDIOLOGY | Facility: CLINIC | Age: 34
End: 2024-02-27
Payer: COMMERCIAL

## 2024-02-27 ENCOUNTER — OFFICE VISIT (OUTPATIENT)
Dept: CARDIOLOGY | Facility: CLINIC | Age: 34
End: 2024-02-27
Payer: COMMERCIAL

## 2024-02-27 ENCOUNTER — LAB (OUTPATIENT)
Dept: LAB | Facility: CLINIC | Age: 34
End: 2024-02-27
Payer: COMMERCIAL

## 2024-02-27 VITALS
OXYGEN SATURATION: 98 % | DIASTOLIC BLOOD PRESSURE: 77 MMHG | WEIGHT: 213.5 LBS | SYSTOLIC BLOOD PRESSURE: 116 MMHG | BODY MASS INDEX: 30.63 KG/M2 | HEART RATE: 83 BPM

## 2024-02-27 DIAGNOSIS — I48.92 ATRIAL FLUTTER, UNSPECIFIED TYPE (H): ICD-10-CM

## 2024-02-27 DIAGNOSIS — Q22.5 EBSTEIN'S ANOMALY: ICD-10-CM

## 2024-02-27 DIAGNOSIS — I47.10 SVT (SUPRAVENTRICULAR TACHYCARDIA) (H): ICD-10-CM

## 2024-02-27 LAB
ALBUMIN SERPL BCG-MCNC: 4.6 G/DL (ref 3.5–5.2)
ALP SERPL-CCNC: 61 U/L (ref 40–150)
ALT SERPL W P-5'-P-CCNC: 23 U/L (ref 0–70)
ANION GAP SERPL CALCULATED.3IONS-SCNC: 10 MMOL/L (ref 7–15)
AST SERPL W P-5'-P-CCNC: 24 U/L (ref 0–45)
BASOPHILS # BLD AUTO: 0.1 10E3/UL (ref 0–0.2)
BASOPHILS NFR BLD AUTO: 1 %
BILIRUB SERPL-MCNC: 0.6 MG/DL
BUN SERPL-MCNC: 11.7 MG/DL (ref 6–20)
CALCIUM SERPL-MCNC: 9.3 MG/DL (ref 8.6–10)
CHLORIDE SERPL-SCNC: 104 MMOL/L (ref 98–107)
CHOLEST SERPL-MCNC: 153 MG/DL
CREAT SERPL-MCNC: 0.9 MG/DL (ref 0.67–1.17)
DEPRECATED HCO3 PLAS-SCNC: 24 MMOL/L (ref 22–29)
EGFRCR SERPLBLD CKD-EPI 2021: >90 ML/MIN/1.73M2
EOSINOPHIL # BLD AUTO: 0.3 10E3/UL (ref 0–0.7)
EOSINOPHIL NFR BLD AUTO: 4 %
ERYTHROCYTE [DISTWIDTH] IN BLOOD BY AUTOMATED COUNT: 12 % (ref 10–15)
FASTING STATUS PATIENT QL REPORTED: YES
GLUCOSE SERPL-MCNC: 95 MG/DL (ref 70–99)
HCT VFR BLD AUTO: 43.2 % (ref 40–53)
HDLC SERPL-MCNC: 34 MG/DL
HGB BLD-MCNC: 15.3 G/DL (ref 13.3–17.7)
IMM GRANULOCYTES # BLD: 0 10E3/UL
IMM GRANULOCYTES NFR BLD: 0 %
INR PPP: 1.09 (ref 0.85–1.15)
LDLC SERPL CALC-MCNC: 76 MG/DL
LYMPHOCYTES # BLD AUTO: 2.3 10E3/UL (ref 0.8–5.3)
LYMPHOCYTES NFR BLD AUTO: 40 %
MCH RBC QN AUTO: 30.6 PG (ref 26.5–33)
MCHC RBC AUTO-ENTMCNC: 35.4 G/DL (ref 31.5–36.5)
MCV RBC AUTO: 86 FL (ref 78–100)
MONOCYTES # BLD AUTO: 0.5 10E3/UL (ref 0–1.3)
MONOCYTES NFR BLD AUTO: 9 %
NEUTROPHILS # BLD AUTO: 2.7 10E3/UL (ref 1.6–8.3)
NEUTROPHILS NFR BLD AUTO: 46 %
NONHDLC SERPL-MCNC: 119 MG/DL
NRBC # BLD AUTO: 0 10E3/UL
NRBC BLD AUTO-RTO: 0 /100
PLATELET # BLD AUTO: 238 10E3/UL (ref 150–450)
POTASSIUM SERPL-SCNC: 4.2 MMOL/L (ref 3.4–5.3)
PROT SERPL-MCNC: 7.3 G/DL (ref 6.4–8.3)
RBC # BLD AUTO: 5 10E6/UL (ref 4.4–5.9)
SODIUM SERPL-SCNC: 138 MMOL/L (ref 135–145)
TRIGL SERPL-MCNC: 213 MG/DL
TSH SERPL DL<=0.005 MIU/L-ACNC: 1.59 UIU/ML (ref 0.3–4.2)
WBC # BLD AUTO: 5.8 10E3/UL (ref 4–11)

## 2024-02-27 PROCEDURE — 99214 OFFICE O/P EST MOD 30 MIN: CPT | Mod: 25

## 2024-02-27 PROCEDURE — 93306 TTE W/DOPPLER COMPLETE: CPT | Performed by: STUDENT IN AN ORGANIZED HEALTH CARE EDUCATION/TRAINING PROGRAM

## 2024-02-27 PROCEDURE — 85025 COMPLETE CBC W/AUTO DIFF WBC: CPT | Performed by: PATHOLOGY

## 2024-02-27 PROCEDURE — 99213 OFFICE O/P EST LOW 20 MIN: CPT

## 2024-02-27 PROCEDURE — 93005 ELECTROCARDIOGRAM TRACING: CPT

## 2024-02-27 PROCEDURE — 84443 ASSAY THYROID STIM HORMONE: CPT | Performed by: PATHOLOGY

## 2024-02-27 PROCEDURE — 80061 LIPID PANEL: CPT | Performed by: PATHOLOGY

## 2024-02-27 PROCEDURE — 36415 COLL VENOUS BLD VENIPUNCTURE: CPT | Performed by: PATHOLOGY

## 2024-02-27 PROCEDURE — 93010 ELECTROCARDIOGRAM REPORT: CPT | Performed by: INTERNAL MEDICINE

## 2024-02-27 PROCEDURE — 85610 PROTHROMBIN TIME: CPT | Performed by: PATHOLOGY

## 2024-02-27 PROCEDURE — 80053 COMPREHEN METABOLIC PANEL: CPT | Performed by: PATHOLOGY

## 2024-02-27 ASSESSMENT — PAIN SCALES - GENERAL: PAINLEVEL: NO PAIN (0)

## 2024-02-27 NOTE — LETTER
February 27th, 2024    Dear Xavi,    Pre-procedure instructions - Coronary Angiogram  Patient Education    1. Your arrival time is 7:30AM on Wednesday, 3/13/24.  Location is [facility] [address], [place] [address] - Main Entrance of the Hospital  *Please check your temperature the morning of procedure and call [facility] at [phone] if temp is >100.0.    2. Please plan on being at the hospital all day.  3. At any time, emergencies and/or urgent cases may come up which could delay the start of your procedure.    Pre-procedure instructions - Coronary Angiogram  - Shower in the evening before or the morning of the procedure    - No solid food for 8 hours prior and nothing to drink 2 hours prior to arrival time    - You can take your morning medications with sips of water.  o You may skip your morning aldactone     - Take 325 mg of Asprin 24 hours prior to the procedure and the morning of procedure.   o This will be 4 baby aspirin on Tuesday morning 3/12 and 4 baby aspirin Wednesday morning 3/13.    - You will need to arrange a ride to drop you off and pick you up, as you will be unable to drive home.  Prior to discharge you may be required to lay flat for approximately 2-4 hours in the recovery unit to ensure proper clotting of the artery. You will need a responsible adult to stay with you for 24 hours post-procedure.      You will need to follow up with Shandra Carranza NP on                           to make sure that you are healing well from your cath procedure. Results of your cath procedure will be reviewed by Dr. Balderas and communicated to you after she reviews them. If you need help scheduling or rescheduling your appointment, please call [phone].

## 2024-02-27 NOTE — PROGRESS NOTES
Adult Congenital Cardiology Visit    Patient:  Xavi Mtz MRN:  [id number]   YOB: 1990 Age:  33 year old   Date of Visit:  Feb 27, 2024 PCP:  Hamzah Atkins MD     Dear Dr. Atkins,     I had the pleasure of seeing your patient Xavi Mtz at the [facility] on Feb 27, 2024.   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 Last seen in clinic in October 2023 .Was scheduled for catheterization that had to be cancelled due to COVID infection.  He is here today for scheduled follow up. He also sees Dr. Engle from  for management of recurrent atrial tachycardia. Last Ablation January 2023. Xavi reports that he has had very poor exercise tolerance for the last 10 years. He is not able to play outside for any length of time with his children. He feels lightheaded and dizzy, both with positional changes and randomly. He has frequent palpitations and feelings of a racing heart beat. No LOC, edema, orthopnea.  He sees the dentist regularly and uses amoxicillin for premedication (lifelong due to bioprosthetic valve)    Interval Hx:  COVID 3 weeks ago, fever/bodyaches- did not take paxlovid due to interaction with diltiazem and need for arrhythmia control. Remains fatigued and unable to play with children.     Past medical history:    Born with Ebsteins anomaly  S/p bioprosthetic tricuspid valve replacement 1999 [facility]  Tricuspid valve balloon valvuloplasty 2017 [facility] [place]  Cardiac Cath 2017 with RVEDP 13, LVEDP 12 PAP 24/9 CI 1.92 L/min/m2.   Placement of a 29 mm Sloan 3 transcatheter valve in tricuspid position with empiric ablation of  IVC/TV in 12/2019  A flutter ablation Jan 2023 ([facility])  Tobacco use- ongoing      Past Medical History:   Diagnosis Date     Congenital heart disease     Ebstein's Anomaly       Current Outpatient  Medications   Medication Sig Dispense Refill     amoxicillin (AMOXIL) 500 MG capsule Take 4 capsules (2,000 mg) by mouth once as needed (one hour prior to dental procedures.) 4 capsule 3     aspirin (ASA) 81 MG tablet Take 1 tablet (81 mg) by mouth daily 90 tablet 3     diltiazem ER COATED BEADS (CARDIZEM CD/CARTIA XT) 180 MG 24 hr capsule Take 1 capsule (180 mg) by mouth every morning (before breakfast) 90 capsule 3     spironolactone (ALDACTONE) 25 MG tablet Take 1 tablet (25 mg) by mouth daily 90 tablet 3       Allergies   Allergen Reactions     Ceclor [Cefaclor]      Mouth ulcers         Family History:   Family History   Problem Relation Age of Onset     Diabetes Mother           Social history:  3 children. 10 yo stepson, 10 yo son with ex wife 7 month old baby girl. Healthy, no cardiac disease,    Social History     Occupational History     Not on file   Tobacco Use     Smoking status: Every Day     Types: Dip, chew, snus or snuff, Other     Smokeless tobacco: Current     Types: Chew     Tobacco comments:      Smokes E-Cigs   Substance and Sexual Activity     Alcohol use: No     Drug use: No     Sexual activity: Yes     Partners: Female     Birth control/protection: Condom       Marital Status:  has female SO      Review of Systems: A comprehensive review of systems was performed and is negative, except as noted in the HPI and PMH  Review of Systems     Physical exam: Vitals: /77 (BP Location: Left arm, Patient Position: Sitting, Cuff Size: Adult Large)   Pulse 83   Wt 96.8 kg (213 lb 8 oz)   SpO2 98%   BMI 30.63 kg/m    BMI= Body mass index is 30.63 kg/m . There is no central or peripheral cyanosis. Pupils are reactive and sclera are not jaundiced. There is no conjunctival injection or discharge. EOMI. Mucous membranes are moist and pink.   Lungs are clear to ausculation bilaterally with no wheezes, rales or rhonchi. There is no increased work of breathing, retractions or nasal flaring.  Precordium is quiet with a normally placed apical impulse. On auscultation, heart sounds are regular with an accentuated S1 and normal S2. There are no murmurs, rubs or gallops.  Abdomen is soft and non-tender without masses or hepatomegaly. Femoral pulses are normal with no brachial femoral delay.Skin is without rashes, lesions, or significant bruising. Extremities are warm and well-perfused with no cyanosis, clubbing or edema. Peripheral pulses are normal and there is < 2 sec capillary refill. Patient is alert and oriented and moves all extremities equally with normal tone.     Echo today:  No change from prior  Patient with history of Ebstein's Anomaly, after 33 mm tricuspid valve  replacement. Percutaneous 25mm balloon valvuloplasty of the tricuspid valve  (7/27/17). Now after 29mm Sloan 3 transcatheter valve on 12/6/2019 at [place].  Tricuspid valve mean gradient 8 mmHg. No signficant tricuspid valve  insufficiency. There is moderate right ventricular enlargement with mild-  moderately reduced systolic function. Qualitatively no change from prior. There  is ventricular septal bouncing and end systolic/early diastolic flattening of  the interventricular septum. Qualitatively normal left ventricular systolic  function. No pericardial effusion.    12 lead ECG today NSR at 77 bpm with RBBB.     Ab normal zio patch Holter December 2023-jan 11 2023: Average HR 83  Range  bpm. 17 episodes of SVT longest lasting 1 hour 34 minutes. At a rate of 210 bpm.         CPX: April 2023  Peak VO2 (ml/kg/min) VE/VCO2  [place] RER   20.29        42.90        1.24            Predicted VO2 (ml/kg/min) Predicted VO2 %   39.10        52            Resting Supine BP (mmHg) Resting Standing BP (mmHg) Final Stress BP (mmHg)   113/68        119/74        188/82          Resting Supine HR (bpm) Resting Standing HR (bpm)    62        71             Max HR (bpm) Max Predicted HR (bpm) Max Perdicted HR %   141        187        75             Exercise time (min) Exercise time (sec) Estimated workload (METS)   6        27        5.8            A cardiopulmonary stress test was performed following a Lv protocol with the patient exercising for 6 minutes and 27 seconds under the supervision of Dr. Kc.  Blood pressure demonstrated a normal response to exercise.  Heart rate demonstrated a blunted response to exercise.     The stress electrocardiogram is negative for ischemia at the heart rate achieved.  The negative predictive value of the examination is reduced due to inability to achieve target HR.     The patient's exercise capacity is severely impaired.     A prior study was conducted on 10/9/2019. This study has changes noted with the prior study. This represents a 29.3% decrease in functional capacity.     The patient reported 7-8/10 shortness of breath and 5/10 light-headedness at peak exercise which resolved during recovery. During recovery, the patient had a 9 beat run of SVT.     ECG Summary    ECG Baseline electrocardiogram demonstrates sinus rhythm and right bundle branch block.   The stress electrocardiogram is negative for ischemia at the heart rate achieved.  The negative predictive value of the examination is reduced due to inability to achieve target HR. Arrhythmias during stress: Occasional PVCs. Arrhythmias during recovery: Supraventricular tachycardia for 9 beats.  Occasional PACs.     Technical Comments    Cardiac Protocol A cardiopulmonary stress test was performed following a Lv protocol with the patient exercising for 6 minutes and 27 seconds under the supervision of Dr. Kc.  Blood pressure demonstrated a normal response to exercise.  Heart rate demonstrated a blunted response to exercise.  The patient's exercise capacity is severely impaired.  The test was stopped due to patient request, dyspnea and light-headedness.  The patient reported dyspnea and light-headedness during the stress test.  Functional capacity response  is Class II: 50-74%. The patient experienced no angina during the test.     Ventilatory    The baseline spirometry revealed normal lung volumes. The patient's oxygen saturation remained above 99% throughout testing.       MRA:  Clinical history: 32 yo Ebstein's anomaly, prosthetic TV 1999, valvuloplasty 2017, transcatheter  valve-in-valve 12/2019, ASD repair, atrial flutter ablation 2019 and atrial tachy ablation 1/2023.      Comparison CMR: None     1. The LV is normal in cavity size and wall thickness. The global systolic function is normal. The LVEF is  58%. There are no regional wall motion abnormalities. There is a prominent septal bounce with systolic  flattening of the septum.     2. The RV is  mild-moderately dilated. The global systolic function is moderately reduced. The RVEF is 34%.      3. The right atrium is moderately dilated.  The left atrium is normal in size. Prior ASD repair, no  residual shunt noted.     4. Transcatheter prosthetic tricuspid valve-in-valve is well seated, cannot assess further due to  associated artifact. No other valve disease noted.     5. Late gadolinium enhancement imaging shows no MI, fibrosis or infiltrative disease.      6. There is no pericardial effusion or thickening.     7. There is no intracardiac thrombus.     CONCLUSIONS: Ebstein's anomaly with moderately reduced RV function, RVEF 34%, LVEF 58%. No myocardial  Fibrosis.    Last Zio -  No sustained arrhythmias.          In summary, Xavi is a 33 year old with Ebsteins anomaly s/p tricuspid valve replacement followed by transcatheter TV valve in 2019. Now with intermittent atrial tachycardia and poor exercise tolerance. 2017 cath suggests overall low cardiac output with mildly diminished function of both left and right ventricles R> L.    Recommendations:  Continue current medications  Will discuss restarting BBlocker or ither antiarrhythmic with Dr. Engle.   Cath rescheduled for MArch 13th- R/L and coronary arteries  due to exercise intolerance and increased arrhythmias.   Post cath ACHD discussion for possible surgical intervention. He may be a candidate for Jemal shunt to offload his right ventricle if he does poorly with this testing.  Continue getting exercise as possible.  Let us know if new or worsening symptoms.      Thank you for the opportunity to participate in Xavi's care.  We will see him back after heart catheterization and ACHD discussion, sooner if more concerns.     A total of 30 minutes were spent in personal review of testing, including echo and zio patch Holter, review of documented history and interval events in chart, face to face visit with discussion of findings and plan and  care coordination.     Sincerely,    Karthik Balderas M.D.   of Pediatrics  Pediatric and Adult Congenital Cardiology  [facility]  Pediatric Cardiology Office [phone]  Adult Congenital Cardiology Triage and Scheduling [phone]      CC:  Xavi Mtz

## 2024-02-27 NOTE — PROGRESS NOTES
Optimal Vascular Metrics    Blood Pressure   BP < 140/90 yes      On Aspirin Yes      On Statin No      Tobacco use   Yes

## 2024-02-27 NOTE — LETTER
2/27/2024      RE: Xavi Mtz  [address] [facility] [place] [address]       Dear Colleague,    Thank you for the opportunity to participate in the care of your patient, Xavi Mtz, at the [facility] [place] at [facility]. Please see a copy of my visit note below.    Adult Congenital Cardiology Visit    Patient:  Xavi Mtz MRN:  [id number]   YOB: 1990 Age:  33 year old   Date of Visit:  Feb 27, 2024 PCP:  Hamzah Atkins MD     Dear Dr. Atkins,     I had the pleasure of seeing your patient Xavi Mtz at the [facility] on Feb 27, 2024.   Xavi is a 32 yo male with Ebsteins anomaly s/p tricuspid valve replacement in 2019 who presented with atrial tachycardiac in November 2022. Last seen in clinic in October 2023 .Was scheduled for catheterization that had to be cancelled due to COVID infection.  He is here today for scheduled follow up. He also sees Dr. Engle from  for management of recurrent atrial tachycardia. Last Ablation January 2023. Xavi reports that he has had very poor exercise tolerance for the last 10 years. He is not able to play outside for any length of time with his children. He feels lightheaded and dizzy, both with positional changes and randomly. He has frequent palpitations and feelings of a racing heart beat. No LOC, edema, orthopnea.  He sees the dentist regularly and uses amoxicillin for premedication (lifelong due to bioprosthetic valve)    Interval Hx:  COVID 3 weeks ago, fever/bodyaches- did not take paxlovid due to interaction with diltiazem and need for arrhythmia control. Remains fatigued and unable to play with children.     Past medical history:    Born with Ebsteins anomaly  S/p bioprosthetic tricuspid valve replacement 1999 [facility]  Tricuspid valve balloon valvuloplasty 2017 [facility]  Cardiac Cath 2017 with  RVEDP 13, LVEDP 12 PAP 24/9 CI 1.92 L/min/m2.   Placement of a 29 mm Sloan 3 transcatheter valve in tricuspid position with empiric ablation of  IVC/TV in 12/2019  A flutter ablation Jan 2023 ([facility])  Tobacco use- ongoing      Past Medical History:   Diagnosis Date    Congenital heart disease     Ebstein's Anomaly       Current Outpatient Medications   Medication Sig Dispense Refill    amoxicillin (AMOXIL) 500 MG capsule Take 4 capsules (2,000 mg) by mouth once as needed (one hour prior to dental procedures.) 4 capsule 3    aspirin (ASA) 81 MG tablet Take 1 tablet (81 mg) by mouth daily 90 tablet 3    diltiazem ER COATED BEADS (CARDIZEM CD/CARTIA XT) 180 MG 24 hr capsule Take 1 capsule (180 mg) by mouth every morning (before breakfast) 90 capsule 3    spironolactone (ALDACTONE) 25 MG tablet Take 1 tablet (25 mg) by mouth daily 90 tablet 3       Allergies   Allergen Reactions    Ceclor [Cefaclor]      Mouth ulcers         Family History:   Family History   Problem Relation Age of Onset    Diabetes Mother           Social history:  3 children. 10 yo stepson, 8 yo son with ex wife 7 month old baby girl. Healthy, no cardiac disease,    Social History     Occupational History    Not on file   Tobacco Use    Smoking status: Every Day     Types: Dip, chew, snus or snuff, Other    Smokeless tobacco: Current     Types: Chew    Tobacco comments:      Smokes E-Cigs   Substance and Sexual Activity    Alcohol use: No    Drug use: No    Sexual activity: Yes     Partners: Female     Birth control/protection: Condom       Marital Status:  has female SO      Review of Systems: A comprehensive review of systems was performed and is negative, except as noted in the HPI and PMH  Review of Systems     Physical exam: Vitals: /77 (BP Location: Left arm, Patient Position: Sitting, Cuff Size: Adult Large)   Pulse 83   Wt 96.8 kg (213 lb 8 oz)   SpO2 98%   BMI 30.63 kg/m    BMI= Body mass index is 30.63 kg/m . There is no  central or peripheral cyanosis. Pupils are reactive and sclera are not jaundiced. There is no conjunctival injection or discharge. EOMI. Mucous membranes are moist and pink.   Lungs are clear to ausculation bilaterally with no wheezes, rales or rhonchi. There is no increased work of breathing, retractions or nasal flaring. Precordium is quiet with a normally placed apical impulse. On auscultation, heart sounds are regular with an accentuated S1 and normal S2. There are no murmurs, rubs or gallops.  Abdomen is soft and non-tender without masses or hepatomegaly. Femoral pulses are normal with no brachial femoral delay.Skin is without rashes, lesions, or significant bruising. Extremities are warm and well-perfused with no cyanosis, clubbing or edema. Peripheral pulses are normal and there is < 2 sec capillary refill. Patient is alert and oriented and moves all extremities equally with normal tone.     Echo today:  No change from prior  Patient with history of Ebstein's Anomaly, after 33 mm tricuspid valve  replacement. Percutaneous 25mm balloon valvuloplasty of the tricuspid valve  (7/27/17). Now after 29mm Sloan 3 transcatheter valve on 12/6/2019 at [place].  Tricuspid valve mean gradient 8 mmHg. No signficant tricuspid valve  insufficiency. There is moderate right ventricular enlargement with mild-  moderately reduced systolic function. Qualitatively no change from prior. There  is ventricular septal bouncing and end systolic/early diastolic flattening of  the interventricular septum. Qualitatively normal left ventricular systolic  function. No pericardial effusion.    12 lead ECG today NSR at 77 bpm with RBBB.     Ab normal zio patch Holter December 2023-jan 11 2023: Average HR 83  Range  bpm. 17 episodes of SVT longest lasting 1 hour 34 minutes. At a rate of 210 bpm.         CPX: April 2023  Peak VO2 (ml/kg/min) VE/VCO2  [place] RER   20.29        42.90        1.24            Predicted VO2 (ml/kg/min) Predicted  VO2 %   39.10        52            Resting Supine BP (mmHg) Resting Standing BP (mmHg) Final Stress BP (mmHg)   113/68        119/74        188/82          Resting Supine HR (bpm) Resting Standing HR (bpm)    62        71             Max HR (bpm) Max Predicted HR (bpm) Max Perdicted HR %   141        187        75            Exercise time (min) Exercise time (sec) Estimated workload (METS)   6        27        5.8            A cardiopulmonary stress test was performed following a Lv protocol with the patient exercising for 6 minutes and 27 seconds under the supervision of Dr. Kc.  Blood pressure demonstrated a normal response to exercise.  Heart rate demonstrated a blunted response to exercise.    The stress electrocardiogram is negative for ischemia at the heart rate achieved.  The negative predictive value of the examination is reduced due to inability to achieve target HR.    The patient's exercise capacity is severely impaired.    A prior study was conducted on 10/9/2019. This study has changes noted with the prior study. This represents a 29.3% decrease in functional capacity.     The patient reported 7-8/10 shortness of breath and 5/10 light-headedness at peak exercise which resolved during recovery. During recovery, the patient had a 9 beat run of SVT.     ECG Summary    ECG Baseline electrocardiogram demonstrates sinus rhythm and right bundle branch block.   The stress electrocardiogram is negative for ischemia at the heart rate achieved.  The negative predictive value of the examination is reduced due to inability to achieve target HR. Arrhythmias during stress: Occasional PVCs. Arrhythmias during recovery: Supraventricular tachycardia for 9 beats.  Occasional PACs.     Technical Comments    Cardiac Protocol A cardiopulmonary stress test was performed following a Lv protocol with the patient exercising for 6 minutes and 27 seconds under the supervision of Dr. Kc.  Blood pressure demonstrated a  normal response to exercise.  Heart rate demonstrated a blunted response to exercise.  The patient's exercise capacity is severely impaired.  The test was stopped due to patient request, dyspnea and light-headedness.  The patient reported dyspnea and light-headedness during the stress test.  Functional capacity response is Class II: 50-74%. The patient experienced no angina during the test.     Ventilatory    The baseline spirometry revealed normal lung volumes. The patient's oxygen saturation remained above 99% throughout testing.       MRA:  Clinical history: 32 yo Ebstein's anomaly, prosthetic TV 1999, valvuloplasty 2017, transcatheter  valve-in-valve 12/2019, ASD repair, atrial flutter ablation 2019 and atrial tachy ablation 1/2023.      Comparison CMR: None     1. The LV is normal in cavity size and wall thickness. The global systolic function is normal. The LVEF is  58%. There are no regional wall motion abnormalities. There is a prominent septal bounce with systolic  flattening of the septum.     2. The RV is  mild-moderately dilated. The global systolic function is moderately reduced. The RVEF is 34%.      3. The right atrium is moderately dilated.  The left atrium is normal in size. Prior ASD repair, no  residual shunt noted.     4. Transcatheter prosthetic tricuspid valve-in-valve is well seated, cannot assess further due to  associated artifact. No other valve disease noted.     5. Late gadolinium enhancement imaging shows no MI, fibrosis or infiltrative disease.      6. There is no pericardial effusion or thickening.     7. There is no intracardiac thrombus.     CONCLUSIONS: Ebstein's anomaly with moderately reduced RV function, RVEF 34%, LVEF 58%. No myocardial  Fibrosis.    Last Zio -  No sustained arrhythmias.          In summary, Xavi is a 33 year old with Ebsteins anomaly s/p tricuspid valve replacement followed by transcatheter TV valve in 2019. Now with intermittent atrial tachycardia and poor  exercise tolerance. 2017 cath suggests overall low cardiac output with mildly diminished function of both left and right ventricles R> L.    Recommendations:  Continue current medications  Will discuss restarting BBlocker or ither antiarrhythmic with Dr. Engle.   Cath rescheduled for MArch 13th- R/L and coronary arteries due to exercise intolerance and increased arrhythmias.   Post cath ACHD discussion for possible surgical intervention. He may be a candidate for Jemal shunt to offload his right ventricle if he does poorly with this testing.  Continue getting exercise as possible.  Let us know if new or worsening symptoms.      Thank you for the opportunity to participate in Xavi's care.  We will see him back after heart catheterization and ACHD discussion, sooner if more concerns.     A total of 30 minutes were spent in personal review of testing, including echo and zio patch Holter, review of documented history and interval events in chart, face to face visit with discussion of findings and plan and  care coordination.         Optimal Vascular Metrics    Blood Pressure   BP < 140/90 yes      On Aspirin Yes      On Statin No      Tobacco use   Yes      Please do not hesitate to contact me if you have any questions/concerns.     Sincerely,     Karthik Balderas MD

## 2024-02-27 NOTE — NURSING NOTE
Chief Complaint   Patient presents with    Follow Up     33 year old male with history of Ebstein's anomaly presenting for evaluation.     Vitals were taken, medications reconciled, and EKG was performed.    Janis Llanes CNA  2:15 PM

## 2024-02-27 NOTE — Clinical Note
2/27/2024       RE: Xavi Mtz  [address] [facility] [address]     Dear Colleague,    Thank you for referring your patient, Xavi Mtz, to the [facility] [place] at [facility]. Please see a copy of my visit note below.    No notes on file    Again, thank you for allowing me to participate in the care of your patient.      Sincerely,    Karthik Balderas MD

## 2024-02-28 LAB
ATRIAL RATE - MUSE: 77 BPM
DIASTOLIC BLOOD PRESSURE - MUSE: NORMAL MMHG
INTERPRETATION ECG - MUSE: NORMAL
P AXIS - MUSE: -8 DEGREES
PR INTERVAL - MUSE: 188 MS
QRS DURATION - MUSE: 158 MS
QT - MUSE: 440 MS
QTC - MUSE: 497 MS
R AXIS - MUSE: 40 DEGREES
SYSTOLIC BLOOD PRESSURE - MUSE: NORMAL MMHG
T AXIS - MUSE: 40 DEGREES
VENTRICULAR RATE- MUSE: 77 BPM

## 2024-03-13 ENCOUNTER — HOSPITAL ENCOUNTER (OUTPATIENT)
Facility: CLINIC | Age: 34
Discharge: HOME | End: 2024-03-13
Attending: INTERNAL MEDICINE | Admitting: INTERNAL MEDICINE
Payer: COMMERCIAL

## 2024-03-13 VITALS
BODY MASS INDEX: 30.97 KG/M2 | OXYGEN SATURATION: 98 % | TEMPERATURE: 100.8 F | SYSTOLIC BLOOD PRESSURE: 118 MMHG | DIASTOLIC BLOOD PRESSURE: 82 MMHG | HEART RATE: 100 BPM | WEIGHT: 216.3 LBS | HEIGHT: 70 IN | RESPIRATION RATE: 18 BRPM

## 2024-03-13 DIAGNOSIS — T82.897D: ICD-10-CM

## 2024-03-13 DIAGNOSIS — Q22.5 EBSTEIN'S ANOMALY: ICD-10-CM

## 2024-03-13 DIAGNOSIS — I48.92 ATRIAL FLUTTER, UNSPECIFIED TYPE (H): ICD-10-CM

## 2024-03-13 DIAGNOSIS — I47.10 SVT (SUPRAVENTRICULAR TACHYCARDIA) (H): ICD-10-CM

## 2024-03-13 PROCEDURE — 999N000071 HC STATISTIC HEART CATH LAB OR EP LAB

## 2024-03-13 PROCEDURE — 93005 ELECTROCARDIOGRAM TRACING: CPT

## 2024-03-13 PROCEDURE — 999N000184 HC STATISTIC TELEMETRY

## 2024-03-13 RX ORDER — ASPIRIN 81 MG/1
243 TABLET, CHEWABLE ORAL ONCE
Status: DISCONTINUED | OUTPATIENT
Start: 2024-03-13 | End: 2024-03-13 | Stop reason: HOSPADM

## 2024-03-13 RX ORDER — SODIUM CHLORIDE 9 MG/ML
INJECTION, SOLUTION INTRAVENOUS CONTINUOUS
Status: DISCONTINUED | OUTPATIENT
Start: 2024-03-13 | End: 2024-03-13 | Stop reason: HOSPADM

## 2024-03-13 RX ORDER — ASPIRIN 325 MG
325 TABLET ORAL ONCE
Status: DISCONTINUED | OUTPATIENT
Start: 2024-03-13 | End: 2024-03-13 | Stop reason: HOSPADM

## 2024-03-13 RX ORDER — POTASSIUM CHLORIDE 1500 MG/1
20 TABLET, EXTENDED RELEASE ORAL
Status: DISCONTINUED | OUTPATIENT
Start: 2024-03-13 | End: 2024-03-13 | Stop reason: HOSPADM

## 2024-03-13 RX ORDER — LIDOCAINE 40 MG/G
CREAM TOPICAL
Status: DISCONTINUED | OUTPATIENT
Start: 2024-03-13 | End: 2024-03-13 | Stop reason: HOSPADM

## 2024-03-13 ASSESSMENT — ACTIVITIES OF DAILY LIVING (ADL): ADLS_ACUITY_SCORE: 35

## 2024-03-15 ENCOUNTER — MYC MEDICAL ADVICE (OUTPATIENT)
Dept: CARDIOLOGY | Facility: CLINIC | Age: 34
End: 2024-03-15

## 2024-03-15 ENCOUNTER — OFFICE VISIT (OUTPATIENT)
Dept: URGENT CARE | Facility: URGENT CARE | Age: 34
End: 2024-03-15
Payer: COMMERCIAL

## 2024-03-15 VITALS
HEART RATE: 97 BPM | RESPIRATION RATE: 20 BRPM | DIASTOLIC BLOOD PRESSURE: 89 MMHG | TEMPERATURE: 101.1 F | SYSTOLIC BLOOD PRESSURE: 138 MMHG | BODY MASS INDEX: 30.99 KG/M2 | OXYGEN SATURATION: 98 % | WEIGHT: 216 LBS

## 2024-03-15 DIAGNOSIS — R07.0 THROAT PAIN: Primary | ICD-10-CM

## 2024-03-15 DIAGNOSIS — J10.1 INFLUENZA B: ICD-10-CM

## 2024-03-15 LAB
ATRIAL RATE - MUSE: 99 BPM
DEPRECATED S PYO AG THROAT QL EIA: NEGATIVE
DIASTOLIC BLOOD PRESSURE - MUSE: NORMAL MMHG
FLUAV AG SPEC QL IA: NEGATIVE
FLUBV AG SPEC QL IA: POSITIVE
GROUP A STREP BY PCR: NOT DETECTED
INTERPRETATION ECG - MUSE: NORMAL
P AXIS - MUSE: 45 DEGREES
PR INTERVAL - MUSE: 196 MS
QRS DURATION - MUSE: 150 MS
QT - MUSE: 390 MS
QTC - MUSE: 500 MS
R AXIS - MUSE: 75 DEGREES
SYSTOLIC BLOOD PRESSURE - MUSE: NORMAL MMHG
T AXIS - MUSE: 2 DEGREES
VENTRICULAR RATE- MUSE: 99 BPM

## 2024-03-15 PROCEDURE — 87804 INFLUENZA ASSAY W/OPTIC: CPT | Performed by: FAMILY MEDICINE

## 2024-03-15 PROCEDURE — 87651 STREP A DNA AMP PROBE: CPT | Performed by: FAMILY MEDICINE

## 2024-03-15 PROCEDURE — 99203 OFFICE O/P NEW LOW 30 MIN: CPT | Performed by: FAMILY MEDICINE

## 2024-03-15 NOTE — PATIENT INSTRUCTIONS
Influenza (Flu): Care Instructions  Overview     Influenza (flu) is an infection in the lungs and breathing passages. It is caused by the influenza virus. There are different strains, or types, of the flu virus from year to year. Unlike the common cold, the flu comes on suddenly and the symptoms can be more severe. These symptoms include a cough, congestion, fever, chills, fatigue, aches, and pains. These symptoms may last for a few weeks. Although the flu can make you feel very sick, it usually doesn't cause serious health problems.  Home treatment is usually all you need for flu symptoms. But your doctor may prescribe antiviral medicine to prevent other health problems, such as pneumonia, from developing. The risk of other health problems from the flu is highest for young children (under 2), older adults (over 65), pregnant women, and people with long-term health conditions.  Follow-up care is a key part of your treatment and safety. Be sure to make and go to all appointments, and call your doctor if you are having problems. It's also a good idea to know your test results and keep a list of the medicines you take.  How can you care for yourself at home?  Get plenty of rest.  Drink plenty of fluids. If you have to limit fluids because of a health problem, talk with your doctor before you increase the amount of fluids you drink.  Take an over-the-counter pain medicine if needed, such as acetaminophen (Tylenol), ibuprofen (Advil, Motrin), or naproxen (Aleve), to relieve fever, headache, and muscle aches. Be safe with medicines. Read and follow all instructions on the label.  No one younger than 20 should take aspirin. It has been linked to Reye syndrome, a serious illness.  Take any prescribed medicine exactly as directed.  Do not smoke. Smoking can make the flu worse. If you need help quitting, talk to your doctor about stop-smoking programs and medicines. These can increase your chances of quitting for good.  If  the skin around your nose and lips becomes sore, put some petroleum jelly (such as Vaseline) on the area.  To ease coughing:  Suck on cough drops or plain, hard candy.  Try an over-the-counter cough or cold medicine. Read and follow all instructions on the label.  Raise your head at night with an extra pillow. This may help you rest if coughing keeps you awake.  To avoid spreading the flu  Wash your hands regularly, and keep your hands away from your face.  Stay home from school, work, and other public places until you are feeling better and your fever has been gone for at least 24 hours. The fever needs to have gone away on its own without the help of medicine.  Ask people living with you to talk to their doctors about preventing the flu. They may get antiviral medicine to keep from getting the flu from you.  To prevent the flu in the future, get the flu vaccine every fall. Encourage people living with you to get the vaccine.  Cover your mouth when you cough or sneeze. If you can, cough or sneeze into the bend of your elbow, not your hands.  When should you call for help?   Call 911 anytime you think you may need emergency care. For example, call if:    You have severe trouble breathing.     You have a seizure.   Call your doctor now or seek immediate medical care if:    You have trouble breathing.     You have a fever with a stiff neck or a severe headache.     You have pain or pressure in your chest or belly.     You have a fever or cough that returns after getting better.     You feel very sleepy, dizzy, or confused.     You are not urinating.     You have severe muscle pain.     You have severe weakness, or you are unsteady.     You have medical conditions that are getting worse.   Watch closely for changes in your health, and be sure to contact your doctor if:    You do not get better as expected.     You are having a problem with your medicine.   Where can you learn more?  Go to  "https://www.[employer]net/patiented  Enter L652 in the search box to learn more about \"Influenza (Flu): Care Instructions.\"  Current as of: June 12, 2023               Content Version: 14.0    7538-0576 [employer].   Care instructions adapted under license by your healthcare professional. If you have questions about a medical condition or this instruction, always ask your healthcare professional. [employer] disclaims any warranty or liability for your use of this information.     "

## 2024-03-15 NOTE — PROGRESS NOTES
SUBJECTIVE:   Xavi Mtz is a 33 year old male presenting with a chief complaint of fever, chills, stuffy nose, cough - non-productive, and sore throat.  Onset of symptoms was 3 day(s) ago.  Course of illness is worsening.    Severity moderate  Current and Associated symptoms:   Treatment measures tried include Tylenol/Ibuprofen.  Predisposing factors include ill contact: Family member .    Past Medical History:   Diagnosis Date    Congenital heart disease     Ebstein's Anomaly     Current Outpatient Medications   Medication Sig Dispense Refill    aspirin (ASA) 81 MG tablet Take 1 tablet (81 mg) by mouth daily 90 tablet 3    diltiazem ER COATED BEADS (CARDIZEM CD/CARTIA XT) 180 MG 24 hr capsule Take 1 capsule (180 mg) by mouth every morning (before breakfast) 90 capsule 3    spironolactone (ALDACTONE) 25 MG tablet Take 1 tablet (25 mg) by mouth daily 90 tablet 3    amoxicillin (AMOXIL) 500 MG capsule Take 4 capsules (2,000 mg) by mouth once as needed (one hour prior to dental procedures.) (Patient not taking: Reported on 3/15/2024) 4 capsule 3     Social History     Tobacco Use    Smoking status: Every Day     Types: Dip, chew, snus or snuff, Other    Smokeless tobacco: Current     Types: Chew    Tobacco comments:      Smokes E-Cigs   Substance Use Topics    Alcohol use: No       ROS:  Review of systems negative except as stated above.    OBJECTIVE:  /89   Pulse 97   Temp (!) 101.1  F (38.4  C) (Tympanic)   Resp 20   Wt 98 kg (216 lb)   SpO2 98%   BMI 30.99 kg/m    GENERAL APPEARANCE: alert, pale, and fatigued  EYES: EOMI,  PERRL, conjunctiva clear  HENT: ear canals and TM's normal.  Nose and mouth without ulcers, erythema or lesions  NECK: supple, nontender, no lymphadenopathy  RESP: lungs clear to auscultation - no rales, rhonchi or wheezes  CV: regular rates and rhythm, normal S1 S2, no murmur noted  NEURO: Normal strength and tone, sensory exam grossly normal,  normal speech and  mentation  SKIN: no suspicious lesions or rashes    ASSESSMENT:  1. Throat pain    - Streptococcus A Rapid Screen w/Reflex to PCR - Clinic Collect  - Influenza A & B Antigen - Clinic Collect  - Group A Streptococcus PCR Throat Swab    2. Influenza B    Patient Instructions     Influenza (Flu): Care Instructions  Overview     Influenza (flu) is an infection in the lungs and breathing passages. It is caused by the influenza virus. There are different strains, or types, of the flu virus from year to year. Unlike the common cold, the flu comes on suddenly and the symptoms can be more severe. These symptoms include a cough, congestion, fever, chills, fatigue, aches, and pains. These symptoms may last for a few weeks. Although the flu can make you feel very sick, it usually doesn't cause serious health problems.  Home treatment is usually all you need for flu symptoms. But your doctor may prescribe antiviral medicine to prevent other health problems, such as pneumonia, from developing. The risk of other health problems from the flu is highest for young children (under 2), older adults (over 65), pregnant women, and people with long-term health conditions.  Follow-up care is a key part of your treatment and safety. Be sure to make and go to all appointments, and call your doctor if you are having problems. It's also a good idea to know your test results and keep a list of the medicines you take.  How can you care for yourself at home?  Get plenty of rest.  Drink plenty of fluids. If you have to limit fluids because of a health problem, talk with your doctor before you increase the amount of fluids you drink.  Take an over-the-counter pain medicine if needed, such as acetaminophen (Tylenol), ibuprofen (Advil, Motrin), or naproxen (Aleve), to relieve fever, headache, and muscle aches. Be safe with medicines. Read and follow all instructions on the label.  No one younger than 20 should take aspirin. It has been linked to Reye  syndrome, a serious illness.  Take any prescribed medicine exactly as directed.  Do not smoke. Smoking can make the flu worse. If you need help quitting, talk to your doctor about stop-smoking programs and medicines. These can increase your chances of quitting for good.  If the skin around your nose and lips becomes sore, put some petroleum jelly (such as Vaseline) on the area.  To ease coughing:  Suck on cough drops or plain, hard candy.  Try an over-the-counter cough or cold medicine. Read and follow all instructions on the label.  Raise your head at night with an extra pillow. This may help you rest if coughing keeps you awake.  To avoid spreading the flu  Wash your hands regularly, and keep your hands away from your face.  Stay home from school, work, and other public places until you are feeling better and your fever has been gone for at least 24 hours. The fever needs to have gone away on its own without the help of medicine.  Ask people living with you to talk to their doctors about preventing the flu. They may get antiviral medicine to keep from getting the flu from you.  To prevent the flu in the future, get the flu vaccine every fall. Encourage people living with you to get the vaccine.  Cover your mouth when you cough or sneeze. If you can, cough or sneeze into the bend of your elbow, not your hands.  When should you call for help?   Call 911 anytime you think you may need emergency care. For example, call if:    You have severe trouble breathing.     You have a seizure.   Call your doctor now or seek immediate medical care if:    You have trouble breathing.     You have a fever with a stiff neck or a severe headache.     You have pain or pressure in your chest or belly.     You have a fever or cough that returns after getting better.     You feel very sleepy, dizzy, or confused.     You are not urinating.     You have severe muscle pain.     You have severe weakness, or you are unsteady.     You have  "medical conditions that are getting worse.   Watch closely for changes in your health, and be sure to contact your doctor if:    You do not get better as expected.     You are having a problem with your medicine.   Where can you learn more?  Go to https://www.[employer].net/patiented  Enter L652 in the search box to learn more about \"Influenza (Flu): Care Instructions.\"  Current as of: June 12, 2023               Content Version: 14.0    0291-9992 [employer].   Care instructions adapted under license by your healthcare professional. If you have questions about a medical condition or this instruction, always ask your healthcare professional. [employer] disclaims any warranty or liability for your use of this information.     Yareli Foreman M.D.      "

## 2024-03-15 NOTE — LETTER
March 15, 2024      Xavi Mtz  [id number] [facility] [id number]        To Whom It May Concern:    Xavi Mtz  was seen on 03/15/24.  Please excuse him  until he is fever free for 24 hours due to illness.        Sincerely,        Yareli Foreman MD

## 2024-04-29 DIAGNOSIS — I48.92 ATRIAL FLUTTER, UNSPECIFIED TYPE (H): ICD-10-CM

## 2024-04-29 DIAGNOSIS — I47.10 SVT (SUPRAVENTRICULAR TACHYCARDIA) (H): ICD-10-CM

## 2024-04-29 DIAGNOSIS — Q22.5 EBSTEIN'S ANOMALY: ICD-10-CM

## 2024-05-08 RX ORDER — SPIRONOLACTONE 25 MG/1
25 TABLET ORAL DAILY
Qty: 90 TABLET | Refills: 1 | Status: SHIPPED | OUTPATIENT
Start: 2024-05-08

## 2024-05-31 ENCOUNTER — ORDERS ONLY (AUTO-RELEASED) (OUTPATIENT)
Dept: CARDIOLOGY | Facility: CLINIC | Age: 34
End: 2024-05-31
Payer: COMMERCIAL

## 2024-05-31 DIAGNOSIS — I48.92 ATRIAL FLUTTER, UNSPECIFIED TYPE (H): ICD-10-CM

## 2024-05-31 DIAGNOSIS — I47.10 SVT (SUPRAVENTRICULAR TACHYCARDIA) (H): ICD-10-CM

## 2024-05-31 DIAGNOSIS — Q22.5 EBSTEIN'S ANOMALY: ICD-10-CM

## 2024-05-31 DIAGNOSIS — R00.1 BRADYCARDIA: ICD-10-CM

## 2024-05-31 PROCEDURE — 93244 EXT ECG>48HR<7D REV&INTERPJ: CPT | Performed by: INTERNAL MEDICINE

## 2024-06-08 ENCOUNTER — HEALTH MAINTENANCE LETTER (OUTPATIENT)
Age: 34
End: 2024-06-08

## 2024-06-27 ENCOUNTER — MYC MEDICAL ADVICE (OUTPATIENT)
Dept: CARDIOLOGY | Facility: CLINIC | Age: 34
End: 2024-06-27
Payer: COMMERCIAL

## 2024-06-27 DIAGNOSIS — Q22.5 EBSTEIN'S ANOMALY: Primary | ICD-10-CM

## 2024-06-27 DIAGNOSIS — I47.10 SVT (SUPRAVENTRICULAR TACHYCARDIA) (H): ICD-10-CM

## 2024-07-05 NOTE — TELEPHONE ENCOUNTER
Date: 7/5/2024    Time of Call: 8:47 AM     Diagnosis:  Ebstein's     [ TORB ] Ordering provider: Dr. Karthik Balderas  Order: Follow-up with Dr. Balderas winter 2024 with ECHO and labs prior.      Order received by: Pamela MARIN RN     Follow-up/additional notes: patient has not responded to multiple attempts to reschedule his diagnostic cath. Appointment orders are in to help prevent full loss to follow up.

## 2024-07-12 NOTE — TELEPHONE ENCOUNTER
Attempted to reach pt to schedule appts, no answer, LVM    Sent letter. Max attempts, if patient wishes to still be seen, please call [phone]

## 2024-08-31 ENCOUNTER — TELEPHONE (OUTPATIENT)
Dept: CARDIOLOGY | Facility: CLINIC | Age: 34
End: 2024-08-31
Payer: COMMERCIAL

## 2024-09-12 NOTE — TELEPHONE ENCOUNTER
LVM for pt to call back to establish care with Dr. Engle.  Sent letter. Max attempts, if patient wishes to still be seen, please call [phone]

## 2024-11-04 DIAGNOSIS — Q22.5 EBSTEIN'S ANOMALY: ICD-10-CM

## 2024-11-04 DIAGNOSIS — I48.92 ATRIAL FLUTTER, UNSPECIFIED TYPE (H): ICD-10-CM

## 2024-11-04 DIAGNOSIS — I47.10 SVT (SUPRAVENTRICULAR TACHYCARDIA) (H): ICD-10-CM

## 2024-11-07 RX ORDER — DILTIAZEM HYDROCHLORIDE 180 MG/1
180 CAPSULE, COATED, EXTENDED RELEASE ORAL
Qty: 90 CAPSULE | Refills: 1 | Status: SHIPPED | OUTPATIENT
Start: 2024-11-07

## 2024-11-07 RX ORDER — SPIRONOLACTONE 25 MG/1
25 TABLET ORAL DAILY
Qty: 90 TABLET | Refills: 1 | Status: SHIPPED | OUTPATIENT
Start: 2024-11-07

## 2025-06-15 ENCOUNTER — HEALTH MAINTENANCE LETTER (OUTPATIENT)
Age: 35
End: 2025-06-15

## (undated) DEVICE — ELECTRODE DEFIB CADENCE 22550R

## (undated) DEVICE — REPRO BARD EP XT DECAPL STRBL DX EP CATH 6F

## (undated) DEVICE — KIT VENOUS FLUSH 60210177

## (undated) DEVICE — PACK HEART LEFT CUSTOM

## (undated) DEVICE — INTRODUCER SHEATH FAST-CATH CATH-LOCK 7FRX12CM 406702

## (undated) DEVICE — CATH EP CS BI-DIRECT 115CM 2-8-2 FJ-CURVE BD710FJ282RTS

## (undated) DEVICE — CATH NAV PENTARAY F CURVE

## (undated) DEVICE — INTRO CATH 12CM 8.5FR FST-CATH

## (undated) DEVICE — PATCH CARTO 3 EXTERNAL REFERENCE 3D MAPPING CREFP6

## (undated) DEVICE — SHEATH AGILIS 8.5FR X 71CM 408310

## (undated) DEVICE — TUBE SET SMARKABLATE IRRIGATION

## (undated) DEVICE — CATH THERMOCOOL SMARTTOUCH SF FJ CURVE

## (undated) DEVICE — INTRO SHEATH MICRO PLATINUM TIP 4FRX40CM 7274

## (undated) DEVICE — CATH EP LIVEWIRE 2MMX95CM 2-10-2 SUPER LG CURVE 401904

## (undated) RX ORDER — LIDOCAINE HYDROCHLORIDE 10 MG/ML
INJECTION, SOLUTION EPIDURAL; INFILTRATION; INTRACAUDAL; PERINEURAL
Status: DISPENSED
Start: 2023-01-24

## (undated) RX ORDER — HEPARIN SODIUM 1000 [USP'U]/ML
INJECTION, SOLUTION INTRAVENOUS; SUBCUTANEOUS
Status: DISPENSED
Start: 2017-07-27

## (undated) RX ORDER — LIDOCAINE HYDROCHLORIDE 10 MG/ML
INJECTION, SOLUTION EPIDURAL; INFILTRATION; INTRACAUDAL; PERINEURAL
Status: DISPENSED
Start: 2017-07-27

## (undated) RX ORDER — CEFAZOLIN SODIUM 1 G/3ML
INJECTION, POWDER, FOR SOLUTION INTRAMUSCULAR; INTRAVENOUS
Status: DISPENSED
Start: 2017-07-27

## (undated) RX ORDER — FENTANYL CITRATE 50 UG/ML
INJECTION, SOLUTION INTRAMUSCULAR; INTRAVENOUS
Status: DISPENSED
Start: 2017-07-27

## (undated) RX ORDER — LORAZEPAM 2 MG/ML
INJECTION INTRAMUSCULAR
Status: DISPENSED
Start: 2023-01-24

## (undated) RX ORDER — MIDAZOLAM HCL IN 0.9 % NACL/PF 1 MG/ML
PLASTIC BAG, INJECTION (ML) INTRAVENOUS
Status: DISPENSED
Start: 2023-01-24

## (undated) RX ORDER — BUPIVACAINE HYDROCHLORIDE 2.5 MG/ML
INJECTION, SOLUTION EPIDURAL; INFILTRATION; INTRACAUDAL
Status: DISPENSED
Start: 2023-01-24

## (undated) RX ORDER — CALCIUM CHLORIDE 100 MG/ML
INJECTION INTRAVENOUS; INTRAVENTRICULAR
Status: DISPENSED
Start: 2017-07-27

## (undated) RX ORDER — DIPHENHYDRAMINE HCL 25 MG
CAPSULE ORAL
Status: DISPENSED
Start: 2023-01-24

## (undated) RX ORDER — CLINDAMYCIN PHOSPHATE 900 MG/50ML
INJECTION, SOLUTION INTRAVENOUS
Status: DISPENSED
Start: 2023-01-24

## (undated) RX ORDER — PROPOFOL 10 MG/ML
INJECTION, EMULSION INTRAVENOUS
Status: DISPENSED
Start: 2017-07-27

## (undated) RX ORDER — FENTANYL CITRATE 50 UG/ML
INJECTION, SOLUTION INTRAMUSCULAR; INTRAVENOUS
Status: DISPENSED
Start: 2023-01-24

## (undated) RX ORDER — ONDANSETRON 2 MG/ML
INJECTION INTRAMUSCULAR; INTRAVENOUS
Status: DISPENSED
Start: 2017-07-27

## (undated) RX ORDER — ADENOSINE 3 MG/ML
INJECTION, SOLUTION INTRAVENOUS
Status: DISPENSED
Start: 2017-07-27

## (undated) RX ORDER — SODIUM CHLORIDE 9 MG/ML
INJECTION, SOLUTION INTRAVENOUS
Status: DISPENSED
Start: 2023-01-24